# Patient Record
Sex: FEMALE | Race: WHITE | NOT HISPANIC OR LATINO | Employment: FULL TIME | URBAN - METROPOLITAN AREA
[De-identification: names, ages, dates, MRNs, and addresses within clinical notes are randomized per-mention and may not be internally consistent; named-entity substitution may affect disease eponyms.]

---

## 2014-12-18 LAB
EXTERNAL HIV CONFIRMATION: NORMAL
EXTERNAL HIV SCREEN: NORMAL

## 2017-01-02 ENCOUNTER — ALLSCRIPTS OFFICE VISIT (OUTPATIENT)
Dept: OTHER | Facility: OTHER | Age: 37
End: 2017-01-02

## 2017-01-26 ENCOUNTER — ALLSCRIPTS OFFICE VISIT (OUTPATIENT)
Dept: OTHER | Facility: OTHER | Age: 37
End: 2017-01-26

## 2017-01-30 ENCOUNTER — GENERIC CONVERSION - ENCOUNTER (OUTPATIENT)
Dept: OTHER | Facility: OTHER | Age: 37
End: 2017-01-30

## 2017-02-15 ENCOUNTER — GENERIC CONVERSION - ENCOUNTER (OUTPATIENT)
Dept: OTHER | Facility: OTHER | Age: 37
End: 2017-02-15

## 2017-03-07 ENCOUNTER — GENERIC CONVERSION - ENCOUNTER (OUTPATIENT)
Dept: OTHER | Facility: OTHER | Age: 37
End: 2017-03-07

## 2017-03-21 ENCOUNTER — GENERIC CONVERSION - ENCOUNTER (OUTPATIENT)
Dept: OTHER | Facility: OTHER | Age: 37
End: 2017-03-21

## 2017-04-24 ENCOUNTER — GENERIC CONVERSION - ENCOUNTER (OUTPATIENT)
Dept: OTHER | Facility: OTHER | Age: 37
End: 2017-04-24

## 2017-05-22 ENCOUNTER — ALLSCRIPTS OFFICE VISIT (OUTPATIENT)
Dept: OTHER | Facility: OTHER | Age: 37
End: 2017-05-22

## 2017-05-22 DIAGNOSIS — M54.50 LOW BACK PAIN: ICD-10-CM

## 2017-09-07 ENCOUNTER — ALLSCRIPTS OFFICE VISIT (OUTPATIENT)
Dept: OTHER | Facility: OTHER | Age: 37
End: 2017-09-07

## 2017-09-28 ENCOUNTER — GENERIC CONVERSION - ENCOUNTER (OUTPATIENT)
Dept: OTHER | Facility: OTHER | Age: 37
End: 2017-09-28

## 2017-11-03 ENCOUNTER — ALLSCRIPTS OFFICE VISIT (OUTPATIENT)
Dept: OTHER | Facility: OTHER | Age: 37
End: 2017-11-03

## 2017-11-08 NOTE — PROGRESS NOTES
Assessment  1  Acute bronchitis, unspecified organism (466 0) (J20 9)   2  Chronic low back pain (724 2,338 29) (M54 5,G89 29)    Plan  Acute bronchitis, unspecified organism    · Breo Ellipta 200-25 MCG/INH Inhalation Aerosol Powder Breath Activated; 1 puff by  mouth daily - rinse mouth out after use   · LevoFLOXacin 500 MG Oral Tablet (Levaquin); TAKE 1 TABLET DAILY  Chronic low back pain    · TraMADol HCl - 50 MG Oral Tablet; TAKE 2 TABLETS BY MOUTH 3 TIMES A DAY  AND TAKE 1 TABLET AT BEDTIME ASNEEDED    Discussion/Summary    69-year-old female presenting today for lower respiratory tract symptoms consistent with a possible acute bronchitis  I will cover for bacterial infection with a 7 day course of Levaquin with side effects discussed  I also gave her sample of Breo to help with her coarse breath sounds/wheezing and chest congestion  She was advised to use 1 puff a day rinsing mouth out after every use and she perform 1st dose in office to ensure proper use  I also advised she start Mucinex DM over the counter for mucus and cough  We will see how she does through the course of the next week and she is to call if symptoms persist or worsen despite treatment  also is asking for refill of her tramadol  She typically gets it prescribed by Dr Preet Barron for #210 pills for 30 days  I do not feel comfortable prescribing this regimen for her chronic pain  She admits that he had referred her to Pain Management for possible injections but she does not have the time  I told her that I did not believe this amount of medication chronically would be beneficial for her chronic back pain  I would have her discuss this with Dr Preet Barron out but in the meantime will give her #60 pills with no refill  Patient left with a printed prescription that was signed  [facility] web site checked  Possible side effects of new medications were reviewed with the patient/guardian today  The treatment plan was reviewed with the patient/guardian   The patient/guardian understands and agrees with the treatment plan      Chief Complaint  Pt c/o congestion, cough, sore throat, and fever x 1 week  History of Present Illness  HPI: 41y/o female here today for URI sxs past several days  Reports sore throat, loose cough, chest congestion  Slight wheeze  Headache  Denies nasal sxs or ear pain  felt feverish the other day  tried dayquil yesterday  Review of Systems    Constitutional: as noted in HPI    ENT: as noted in HPI  Cardiovascular: no complaints of slow or fast heart rate, no chest pain, no palpitations, no leg claudication or lower extremity edema  Respiratory: as noted in HPI  Active Problems  1  Basal cell carcinoma of back (173 51) (C44 519)   2  Chronic low back pain (724 2,338 29) (M54 5,G89 29)   3  Depression with anxiety (300 4) (F41 8)   4  Dysplastic nevus of trunk (216 5) (D22 5)   5  Insomnia (780 52) (G47 00)   6  Skin lesion of face (709 9) (L98 9)   7  History of Tobacco use (305 1) (Z72 0)    Past Medical History  1  History of Abdominal pain, RUQ (789 01) (R10 11)   2  History of Acute bacterial conjunctivitis of left eye (372 03) (H10 32)   3  History of Acute upper respiratory infection (465 9) (J06 9)   4  History of Anxiety (300 00) (F41 9)   5  History of Breastfeeding (infant) (V49 89) (Z78 9)   6  History of Dysuria (788 1) (R30 0)   7  History of abdominal pain (V13 89) (Z87 898)   8  History of acute bacterial sinusitis (V12 69) (Z87 09)   9  History of acute bacterial sinusitis (V12 69) (Z87 09)   10  History of acute sinusitis (V12 69) (Z87 09)   11  History of atopic dermatitis (V13 3) (Z87 2)   12  History of constipation (V12 79) (Z87 19)   13  History of fatigue (V13 89) (Z87 898)   14  History of insomnia (V13 89) (Z87 898)   15  History of low back pain (V13 59) (Z87 39)   16  History of low back pain (V13 59) (Z87 39)   17  History of low back pain (V13 59) (Z87 39)   18   History of urinary frequency (V13 09) (Z87 898)   19  History of Muscle strain of scapular region, left, initial encounter (840 9) (S46 912A)   20  History of Nasal mucosa dry (478 19) (J34 89)   21  History of Need for prophylactic vaccination and inoculation against influenza (V04 81)    (Z23)   22  History of Right wrist pain (719 43) (M25 531)   23  History of Sacroiliitis (720 2) (M46 1)   24  History of Screening for depression (V79 0) (Z13 89)   25  History of Skin lesion (709 9) (L98 9)   26  History of Symptoms of urinary tract infection (788 99) (R39 9)   27  History of Visit For: Medication Refill   28  History of Vitamin D deficiency (268 9) (E55 9)    Family History  Mother    1  No pertinent family history    Social History   · Denied: History of Alcohol Use (History)   · History of Current Every Day Smoker (305 1)   · History of Tobacco use (305 1) (Z72 0)  The social history was reviewed and updated today  Surgical History  1  History of  Section    Current Meds   1  TraMADol HCl - 50 MG Oral Tablet; TAKE 2 TABLETS BY MOUTH 3 TIMES A DAY AND   TAKE 1 TABLET AT BEDTIME ASNEEDED; Therapy: 06SCM1802 to ([place])  Requested for: 79Xdv4342; Last   Rx:[id number] Ordered    The medication list was reviewed and updated today  Allergies  1  No Known Drug Allergies    Vitals   Recorded: 27REH5253 09:21AM   Temperature 99 1 F, Tympanic   Heart Rate 80   Respiration Quality Normal   Respiration 17   Systolic 041, LUE, Standing   Diastolic 66, LUE, Standing   Height 5 ft 4 in   Weight 133 lb    BMI Calculated 22 83   BSA Calculated 1 64   O2 Saturation 98, RA   LMP 35Qlv6450   Pain Scale 5     Physical Exam    Constitutional   General appearance: Abnormal   acutely ill,-- comfortable,-- within normal limits of ideal weight-- and-- appearance reflects stated age  Eyes   Conjunctiva and lids: No swelling, erythema or discharge      Ears, Nose, Mouth, and Throat   External inspection of ears and nose: Normal     Otoscopic examination: Tympanic membranes translucent with normal light reflex  Canals patent without erythema  Nasal mucosa, septum, and turbinates: Normal without edema or erythema  Oropharynx: Abnormal  -- PND  Pulmonary   Respiratory effort: Abnormal  -- loose cough  Auscultation of lungs: Abnormal  -- wheeze, coarse BS throughout posterior lung fields  Cardiovascular   Auscultation of heart: Normal rate and rhythm, normal S1 and S2, without murmurs           Signatures   Electronically signed by : Roma Dos Santos [facility]; Nov  3 2017 10:13AM EST                       (Author)    Electronically signed by : Karmen Parks DO; Nov 8 2017  4:21AM EST                       (Co-author)

## 2017-11-28 ENCOUNTER — APPOINTMENT (OUTPATIENT)
Dept: RADIOLOGY | Facility: MEDICAL CENTER | Age: 37
End: 2017-11-28
Payer: COMMERCIAL

## 2017-11-28 ENCOUNTER — ALLSCRIPTS OFFICE VISIT (OUTPATIENT)
Dept: OTHER | Facility: OTHER | Age: 37
End: 2017-11-28

## 2017-11-28 DIAGNOSIS — M43.06 SPONDYLOLYSIS OF LUMBAR REGION: ICD-10-CM

## 2017-11-28 PROCEDURE — 72114 X-RAY EXAM L-S SPINE BENDING: CPT

## 2017-11-29 NOTE — CONSULTS
Assessment  Assessed    1  Chronic low back pain (724 2,338 29) (M54 5,G89 29)   2  Lumbar pars defect (738 4) (M43 06)   3  Lumbar spondylosis (721 3) (M47 816)    Plan  Lumbar pars defect    · XR SPINE LUMBAR COMPLETE W BENDING MINIMUM 6 VIEWS; Status:Active; Requested for:28Nov2017;    Perform:[facility]; [phone]; Ordered;pars defect; Ordered By:Gurvinder Salas;  Lumbar spondylosis    · Diagnostic Lumbar Medial Branch Block-POC; Status:Active; Requested for:28Nov2017;    Perform: In Office; Order Comments:Bilateral L3-5 MBB 1 ; TGV:[id number]; Ordered;spondylosis; Ordered By:Kendra Salas; Discussion/Summary    1  We will schedule the patient for bilateral L3-5 medial branch nerve blocks with intention of moving forward towards radiofrequency ablation if there is an appropriate diagnostic response  The initial blocks will be performed with 2% lidocaine and if an appropriate response is obtained upon review of the patient's pain diary, a confirmatory block will be scheduled with 0 5% bupivacaine  the office today, we reviewed the nature of facet joint pathology in depth using a spine model  We discussed the approach we would use for the injections and provided literature for home review  The patient understands the risks associated with the procedure including bleeding, infection, tissue injury, and allergic reaction and provided verbal informed consent in the office today  The patient states that she would like to wean off of tramadol  I would recommend her current prescribing physician begin decreasing Tramadol by one pill per day for each week until she is successfully weaned from the medication  Currently using 7 pills per day  Would also consider adding an antiinflammatory depending on how she does with MBBs and RFA  Chief Complaint  Chief Complaints    1   Back Pain  chronic low back pain      History of Present Illness  The patient is a very pleasant 50-year-old female sent from her primary care team for further evaluation and management of her chronic lower back pain which started after her pregnancy about 8 years ago  She was evaluated by Dr Argenis Rivas in the past and found to have bilateral L5 pars defects  I do not have any imaging to review  currently describes moderate pain rated as a 4/10 while taking medications  This is nearly constant and worse at nighttime  She characterizes the pain as a dull aching sensation in her back as well as throbbing without radiation down the legs  factors include lying down, standing, sitting, exercise, coughing, sneezing, and during menstruation she has intense pain  has tried physical therapy as well as exercise, local heat or ice application and chiropractic manipulation all without relief   have personally reviewed and/or updated the patient's past medical history, past surgical history, family history, social history, current medications, allergies, and vital signs today  did review the [place] Prescription Drug Monitoring Program website today which was appropriate for the medications being prescribed  Referring physician is  Dr Tivis Nyhan  Primary Care physician is  patricia Lux presents with complaints of gradual onset of constant episodes of moderate bilateral lower back pain, described as dull, aching and throbbing, non-radiating  On a scale of 1 to 10, the patient rates the pain as 5  Review of Systems   Constitutional: no fever,-- no recent weight gain-- and-- no recent weight loss  Eyes: no double vision-- and-- no blurry vision  Cardiovascular: no chest pain,-- no palpitations-- and-- no lower extremity edema  Respiratory: no complaints of shortness of breath-- and-- no wheezing  Musculoskeletal: no difficulty walking,-- no muscle weakness,-- no joint stiffness,-- no joint swelling,-- no limb swelling,-- no pain in extremity-- and-- no decreased range of motion    Neurological: no dizziness,-- no difficulty swallowing,-- no memory loss,-- no loss of consciousness-- and-- no seizures  Gastrointestinal: constipation, but-- no nausea,-- no vomiting-- and-- no diarrhea  Genitourinary: no difficulty initiating urine stream,-- no genital pain-- and-- no frequent urination  Integumentary: no complaints of skin rash  Psychiatric: no depression  Endocrine: no excessive thirst,-- no adrenal disease,-- no hypothyroidism-- and-- no hyperthyroidism  Hematologic/Lymphatic: no tendency for easy bruising-- and-- no tendency for easy bleeding  Active Problems  Problems    1  Acute bronchitis, unspecified organism (466 0) (J20 9)   2  Basal cell carcinoma of back (173 51) (C44 519)   3  Chronic low back pain (724 2,338 29) (M54 5,G89 29)   4  Depression with anxiety (300 4) (F41 8)   5  Dysplastic nevus of trunk (216 5) (D22 5)   6  Insomnia (780 52) (G47 00)   7  Skin lesion of face (709 9) (L98 9)    Past Medical History  Problems    1  History of Abdominal pain, RUQ (789 01) (R10 11)   2  History of Acute bacterial conjunctivitis of left eye (372 03) (H10 32)   3  History of Acute upper respiratory infection (465 9) (J06 9)   4  History of Anxiety (300 00) (F41 9)   5  History of Breastfeeding (infant) (V49 89) (Z78 9)   6  History of Dysuria (788 1) (R30 0)   7  History of abdominal pain (V13 89) (Z87 898)   8  History of acute bacterial sinusitis (V12 69) (Z87 09)   9  History of acute bacterial sinusitis (V12 69) (Z87 09)   10  History of acute sinusitis (V12 69) (Z87 09)   11  History of atopic dermatitis (V13 3) (Z87 2)   12  History of constipation (V12 79) (Z87 19)   13  History of fatigue (V13 89) (Z87 898)   14  History of insomnia (V13 89) (Z87 898)   15  History of low back pain (V13 59) (Z87 39)   16  History of low back pain (V13 59) (Z87 39)   17  History of low back pain (V13 59) (Z87 39)   18  History of urinary frequency (V13 09) (Z87 898)   19   History of Muscle strain of scapular region, left, initial encounter (840 9) (S41 967D)   20  History of Nasal mucosa dry (478 19) (J34 89)   21  History of Need for prophylactic vaccination and inoculation against influenza (V04 81)  (Z23)   22  History of Right wrist pain (719 43) (M25 531)   23  History of Sacroiliitis (720 2) (M46 1)   24  History of Screening for depression (V79 0) (Z13 89)   25  History of Skin lesion (709 9) (L98 9)   26  History of Symptoms of urinary tract infection (788 99) (R39 9)   27  History of Visit For: Medication Refill   28  History of Vitamin D deficiency (268 9) (E55 9)    Surgical History  Problems    1  History of  Section    Social History  Problems    · Denied: History of Alcohol Use (History)    Current Meds   1  TraMADol HCl - 50 MG Oral Tablet; TAKE 2 TABLETS BY MOUTH 3 TIMES A DAY AND TAKE 1 TABLET AT BEDTIME ASNEEDED; Therapy: 27KBU9454 to (Evaluate:06Mef9248)  Requested for: 23JFD3092; Last Rx:2017 Ordered    Allergies  Medication    1  No Known Drug Allergies    Vitals  Vital Signs    Recorded: 27OLX7202 08:43AM   Heart Rate 76   Respiration 12   Systolic 304   Diastolic 62   Height 5 ft 4 in   Weight 132 lb    BMI Calculated 22 66   BSA Calculated 1 64   Pain Scale 4       Physical Exam   Constitutional  General appearance: Well developed, well nourished, alert, in no distress, non-toxic and no overt pain behavior  Eyes  Sclera: anicteric  HEENT  Hearing grossly intact  Pulmonary  Respiratory effort: Even and unlabored  Cardiovascular  Examination of extremities: No edema or pitting edema present  Skin  Skin and subcutaneous tissue: Normal without rashes or lesions, well hydrated  Psychiatric  Mood and affect: Mood and affect appropriate  Neurologic  Cranial nerves: Cranial nerves II-XII grossly intact     Musculoskeletal  Gait and station: Normal    Lumbar/Sacral Spine examination demonstrates Lumbosacral Spine:  Tenderness: right paraspinal-- and-- left paraspinal  Flexion was not restricted-- and-- was painless  Extension was restricted-- and-- was painful  Left lateral flexion was not restricted-- and-- was painless  Right lateral flexion was not restricted-- and-- was painless  Rotation to the left was not restricted-- and-- was painless  Rotation to the right was not restricted-- and-- was painless  Foot and ankle strength was normal bilaterally  Knee strength was normal bilaterally  Hip strength was normal bilaterally  Evaluation of Muscle Stretch Reflexes on the right side demonstrates 2/4 Knee Jerk Reflex,-- 2/4 Ankle Jerk Reflex-- and-- negative right ankle clonus  Evaluation of Muscle Stretch Reflexes on the left side demonstrates negative left ankle clonus, but-- 2/4 Knee Jerk Reflex-- and-- 2/4 Ankle Jerk Reflex  Special Tests: negative Slump test on right-- and-- negative Slump test on left        Signatures   Electronically signed by : Jack Hammer DO; Nov 28 2017  9:05AM EST                       (Author)

## 2017-12-01 ENCOUNTER — GENERIC CONVERSION - ENCOUNTER (OUTPATIENT)
Dept: OTHER | Facility: OTHER | Age: 37
End: 2017-12-01

## 2017-12-11 ENCOUNTER — ALLSCRIPTS OFFICE VISIT (OUTPATIENT)
Dept: RADIOLOGY | Facility: MEDICAL CENTER | Age: 37
End: 2017-12-11
Payer: COMMERCIAL

## 2017-12-21 ENCOUNTER — GENERIC CONVERSION - ENCOUNTER (OUTPATIENT)
Dept: OTHER | Facility: OTHER | Age: 37
End: 2017-12-21

## 2018-01-04 ENCOUNTER — GENERIC CONVERSION - ENCOUNTER (OUTPATIENT)
Dept: OTHER | Facility: OTHER | Age: 38
End: 2018-01-04

## 2018-01-12 VITALS
TEMPERATURE: 98.9 F | RESPIRATION RATE: 16 BRPM | WEIGHT: 134.06 LBS | DIASTOLIC BLOOD PRESSURE: 62 MMHG | SYSTOLIC BLOOD PRESSURE: 98 MMHG | BODY MASS INDEX: 22.89 KG/M2 | OXYGEN SATURATION: 98 % | HEIGHT: 64 IN | HEART RATE: 92 BPM

## 2018-01-12 VITALS
DIASTOLIC BLOOD PRESSURE: 72 MMHG | TEMPERATURE: 99 F | HEIGHT: 64 IN | WEIGHT: 131.44 LBS | BODY MASS INDEX: 22.44 KG/M2 | SYSTOLIC BLOOD PRESSURE: 104 MMHG | HEART RATE: 78 BPM | RESPIRATION RATE: 16 BRPM | OXYGEN SATURATION: 98 %

## 2018-01-13 VITALS
DIASTOLIC BLOOD PRESSURE: 62 MMHG | BODY MASS INDEX: 22.53 KG/M2 | RESPIRATION RATE: 12 BRPM | WEIGHT: 132 LBS | HEIGHT: 64 IN | HEART RATE: 76 BPM | SYSTOLIC BLOOD PRESSURE: 116 MMHG

## 2018-01-14 VITALS
RESPIRATION RATE: 17 BRPM | BODY MASS INDEX: 22.71 KG/M2 | SYSTOLIC BLOOD PRESSURE: 120 MMHG | TEMPERATURE: 99.1 F | WEIGHT: 133 LBS | HEART RATE: 80 BPM | HEIGHT: 64 IN | OXYGEN SATURATION: 98 % | DIASTOLIC BLOOD PRESSURE: 66 MMHG

## 2018-01-14 VITALS
BODY MASS INDEX: 22.97 KG/M2 | WEIGHT: 134.56 LBS | OXYGEN SATURATION: 98 % | HEIGHT: 64 IN | HEART RATE: 75 BPM | TEMPERATURE: 99.2 F | RESPIRATION RATE: 16 BRPM | SYSTOLIC BLOOD PRESSURE: 100 MMHG | DIASTOLIC BLOOD PRESSURE: 70 MMHG

## 2018-01-14 VITALS
BODY MASS INDEX: 22.79 KG/M2 | DIASTOLIC BLOOD PRESSURE: 68 MMHG | WEIGHT: 133.5 LBS | RESPIRATION RATE: 16 BRPM | OXYGEN SATURATION: 98 % | HEIGHT: 64 IN | HEART RATE: 76 BPM | TEMPERATURE: 98.3 F | SYSTOLIC BLOOD PRESSURE: 96 MMHG

## 2018-01-16 NOTE — RESULT NOTES
Verified Results  US ABDOMEN LIMITED [id number] 09:03AM Sharon Peer Order Number: [id number]     Test Name Result Flag Reference   US ABDOMEN LIMITED (Report)     RIGHT UPPER QUADRANT ULTRASOUND     INDICATION: Pain between the shoulders after eating        COMPARISON: None  TECHNIQUE:  Real-time ultrasound of the right upper quadrant was performed with a curvilinear transducer with both volumetric sweeps and still imaging techniques  FINDINGS:   PANCREAS: Visualized portions of the pancreas are within normal limits  AORTA AND IVC: Visualized portions are normal for patient age  LIVER:   Size: Within normal range  The liver measures 12 4 cm in the midclavicular line  Contour: Surface contour is smooth  Parenchyma: Echogenicity and echotexture are within normal limits  No evidence of suspicious mass  The main portal vein is patent and hepatopetal       BILIARY:   The gallbladder is normal in caliber  No wall thickening or pericholecystic fluid  No stones or sludge identified  No sonographic Dominguez's sign  No intrahepatic biliary dilatation  CBD measures 4 mm  No choledocholithiasis  KIDNEY:    Right kidney measures 10 7 x 4 1 cm  Within normal limits  ASCITES:  None  IMPRESSION:   Normal exam        Workstation performed: [id number]     Signed by:    Serena Adam DO   2/25/16

## 2018-01-17 ENCOUNTER — HOSPITAL ENCOUNTER (OUTPATIENT)
Dept: RADIOLOGY | Facility: MEDICAL CENTER | Age: 38
Discharge: HOME | End: 2018-01-17
Attending: PHYSICAL MEDICINE & REHABILITATION
Payer: COMMERCIAL

## 2018-01-17 VITALS
OXYGEN SATURATION: 100 % | HEART RATE: 74 BPM | TEMPERATURE: 98.5 F | SYSTOLIC BLOOD PRESSURE: 100 MMHG | RESPIRATION RATE: 20 BRPM | DIASTOLIC BLOOD PRESSURE: 69 MMHG

## 2018-01-17 RX ORDER — BUPIVACAINE HYDROCHLORIDE 5 MG/ML
10 INJECTION, SOLUTION EPIDURAL; INTRACAUDAL ONCE
Status: COMPLETED | OUTPATIENT
Start: 2018-01-17 | End: 2018-01-17

## 2018-01-17 RX ADMIN — BUPIVACAINE HYDROCHLORIDE 3 ML: 5 INJECTION, SOLUTION EPIDURAL; INTRACAUDAL at 09:02

## 2018-01-17 RX ADMIN — IOHEXOL 1.5 ML: 300 INJECTION, SOLUTION INTRAVENOUS at 09:01

## 2018-01-17 NOTE — DISCHARGE INSTRUCTIONS

## 2018-01-17 NOTE — H&P
History of Present Illness: The patient is a 44 y o  female who presents with complaints of Axial lower back pain  Assessment  Assessed    1  Chronic low back pain (724 2,338 29) (M54 5,G89 29)   2  Lumbar pars defect (738 4) (M43 06)   3  Lumbar spondylosis (721 3) (M47 816)     Plan  Lumbar pars defect    · XR SPINE LUMBAR COMPLETE W BENDING MINIMUM 6 VIEWS; Status:Active; Requested for:28Nov2017;    Perform:[facility]; [phone]; Ordered;:Lumbar pars defect; Ordered By:Gurvinder Salas;  Lumbar spondylosis    · Diagnostic Lumbar Medial Branch Block-POC; Status:Active; Requested for:28Nov2017;    Perform: In Office; Order Comments:Bilateral L3-5 MBB 1 ; XVV:[id number]; Ordered;:Lumbar spondylosis; Ordered By:Brittany Salas;     Discussion/Summary     1  We will schedule the patient for bilateral L3-5 medial branch nerve blocks with intention of moving forward towards radiofrequency ablation if there is an appropriate diagnostic response  The initial blocks will be performed with 2% lidocaine and if an appropriate response is obtained upon review of the patient's pain diary, a confirmatory block will be scheduled with 0 5% bupivacaine  the office today, we reviewed the nature of facet joint pathology in depth using a spine model  We discussed the approach we would use for the injections and provided literature for home review  The patient understands the risks associated with the procedure including bleeding, infection, tissue injury, and allergic reaction and provided verbal informed consent in the office today        The patient states that she would like to wean off of tramadol  I would recommend her current prescribing physician begin decreasing Tramadol by one pill per day for each week until she is successfully weaned from the medication  Currently using 7 pills per day  Would also consider adding an antiinflammatory depending on how she does with MBBs and RFA        Chief Complaint  Chief Complaints 1  Back Pain  chronic low back pain      History of Present Illness  The patient is a very pleasant 50-year-old female sent from her primary care team for further evaluation and management of her chronic lower back pain which started after her pregnancy about 8 years ago  She was evaluated by Dr Armando Jarvis in the past and found to have bilateral L5 pars defects  I do not have any imaging to review  currently describes moderate pain rated as a 4/10 while taking medications  This is nearly constant and worse at nighttime  She characterizes the pain as a dull aching sensation in her back as well as throbbing without radiation down the legs  factors include lying down, standing, sitting, exercise, coughing, sneezing, and during menstruation she has intense pain  has tried physical therapy as well as exercise, local heat or ice application and chiropractic manipulation all without relief   have personally reviewed and/or updated the patient's past medical history, past surgical history, family history, social history, current medications, allergies, and vital signs today  did review the [place] Prescription Drug Monitoring Program website today which was appropriate for the medications being prescribed  Referring physician is  Dr Jorge Luis Coronel  Primary Care physician is  patricia Mckay presents with complaints of gradual onset of constant episodes of moderate bilateral lower back pain, described as dull, aching and throbbing, non-radiating  On a scale of 1 to 10, the patient rates the pain as 5  Review of Systems   Constitutional: no fever,-- no recent weight gain-- and-- no recent weight loss  Eyes: no double vision-- and-- no blurry vision  Cardiovascular: no chest pain,-- no palpitations-- and-- no lower extremity edema  Respiratory: no complaints of shortness of breath-- and-- no wheezing    Musculoskeletal: no difficulty walking,-- no muscle weakness,-- no joint stiffness,-- no joint swelling,-- no limb swelling,-- no pain in extremity-- and-- no decreased range of motion  Neurological: no dizziness,-- no difficulty swallowing,-- no memory loss,-- no loss of consciousness-- and-- no seizures  Gastrointestinal: constipation, but-- no nausea,-- no vomiting-- and-- no diarrhea  Genitourinary: no difficulty initiating urine stream,-- no genital pain-- and-- no frequent urination  Integumentary: no complaints of skin rash  Psychiatric: no depression  Endocrine: no excessive thirst,-- no adrenal disease,-- no hypothyroidism-- and-- no hyperthyroidism  Hematologic/Lymphatic: no tendency for easy bruising-- and-- no tendency for easy bleeding  Active Problems  Problems    1  Acute bronchitis, unspecified organism (466 0) (J20 9)   2  Basal cell carcinoma of back (173 51) (C44 519)   3  Chronic low back pain (724 2,338 29) (M54 5,G89 29)   4  Depression with anxiety (300 4) (F41 8)   5  Dysplastic nevus of trunk (216 5) (D22 5)   6  Insomnia (780 52) (G47 00)   7  Skin lesion of face (709 9) (L98 9)     Past Medical History  Problems    1  History of Abdominal pain, RUQ (789 01) (R10 11)   2  History of Acute bacterial conjunctivitis of left eye (372 03) (H10 32)   3  History of Acute upper respiratory infection (465 9) (J06 9)   4  History of Anxiety (300 00) (F41 9)   5  History of Breastfeeding (infant) (V49 89) (Z78 9)   6  History of Dysuria (788 1) (R30 0)   7  History of abdominal pain (V13 89) (Z87 898)   8  History of acute bacterial sinusitis (V12 69) (Z87 09)   9  History of acute bacterial sinusitis (V12 69) (Z87 09)   10  History of acute sinusitis (V12 69) (Z87 09)   11  History of atopic dermatitis (V13 3) (Z87 2)   12  History of constipation (V12 79) (Z87 19)   13  History of fatigue (V13 89) (Z87 898)   14  History of insomnia (V13 89) (Z87 898)   15  History of low back pain (V13 59) (Z87 39)   16  History of low back pain (V13 59) (Z87 39)   17   History of low back pain (V13 59) (Z87 39)   18  History of urinary frequency (V13 09) (Z87 898)   19  History of Muscle strain of scapular region, left, initial encounter (840 9) (S46 912A)   20  History of Nasal mucosa dry (478 19) (J34 89)   21  History of Need for prophylactic vaccination and inoculation against influenza (V04 81)  (Z23)   22  History of Right wrist pain (719 43) (M25 531)   23  History of Sacroiliitis (720 2) (M46 1)   24  History of Screening for depression (V79 0) (Z13 89)   25  History of Skin lesion (709 9) (L98 9)   26  History of Symptoms of urinary tract infection (788 99) (R39 9)   27  History of Visit For: Medication Refill   28  History of Vitamin D deficiency (268 9) (E55 9)     Surgical History  Problems    1  History of  Section     Social History  Problems    · Denied: History of Alcohol Use (History)     Current Meds   1  TraMADol HCl - 50 MG Oral Tablet; TAKE 2 TABLETS BY MOUTH 3 TIMES A DAY AND TAKE 1 TABLET AT BEDTIME ASNEEDED; Therapy: 27ZHY2976 to (Evaluate:85Nkm6218)  Requested for: 26OLZ3477; Last Rx:2017 Ordered     Allergies  Medication    1  No Known Drug Allergies     Vitals  Vital Signs         Physical Exam   Constitutional  General appearance: Well developed, well nourished, alert, in no distress, non-toxic and no overt pain behavior  Eyes  Sclera: anicteric  HEENT  Hearing grossly intact  Pulmonary  Respiratory effort: Even and unlabored  Cardiovascular  Examination of extremities: No edema or pitting edema present  Skin  Skin and subcutaneous tissue: Normal without rashes or lesions, well hydrated  Psychiatric  Mood and affect: Mood and affect appropriate  Neurologic  Cranial nerves: Cranial nerves II-XII grossly intact  Musculoskeletal  Gait and station: Normal    Lumbar/Sacral Spine examination demonstrates Lumbosacral Spine:  Tenderness: right paraspinal-- and-- left paraspinal  Flexion was not restricted-- and-- was painless   Extension was restricted-- and-- was painful  Left lateral flexion was not restricted-- and-- was painless  Right lateral flexion was not restricted-- and-- was painless  Rotation to the left was not restricted-- and-- was painless  Rotation to the right was not restricted-- and-- was painless  Foot and ankle strength was normal bilaterally  Knee strength was normal bilaterally  Hip strength was normal bilaterally  Evaluation of Muscle Stretch Reflexes on the right side demonstrates 2/4 Knee Jerk Reflex,-- 2/4 Ankle Jerk Reflex-- and-- negative right ankle clonus  Evaluation of Muscle Stretch Reflexes on the left side demonstrates negative left ankle clonus, but-- 2/4 Knee Jerk Reflex-- and-- 2/4 Ankle Jerk Reflex  Special Tests: negative Slump test on right-- and-- negative Slump test on left  No Known Allergies    Physical Exam:   Vitals:    01/17/18 0847   BP: 105/67   Pulse: 78   Resp: 20   Temp: 98 5 °F (36 9 °C)   SpO2: 99%     General: Awake, Alert, Oriented x 3, Mood and affect appropriate  Respiratory: Respirations even and unlabored  Cardiovascular: Peripheral pulses intact; no edema  Musculoskeletal Exam:   Lumbar pain with extension and rotation    ASA Score:  2  Assessment:  Lumbar spondylosis    Plan:  Bilateral L3-5 MBB 2

## 2018-01-23 ENCOUNTER — ALLSCRIPTS OFFICE VISIT (OUTPATIENT)
Dept: OTHER | Facility: OTHER | Age: 38
End: 2018-01-23

## 2018-01-23 ENCOUNTER — GENERIC CONVERSION - ENCOUNTER (OUTPATIENT)
Dept: OTHER | Facility: OTHER | Age: 38
End: 2018-01-23

## 2018-01-23 NOTE — PROCEDURES
Chief Complaint  Pt presents for biopsy from upper back  Current Meds   1  Sertraline HCl - 50 MG Oral Tablet; TAKE 1 AND 1/2 TABLETS DAILY; Therapy: 28SHZ5186 to (Evaluate:12Apr2017)  Requested for: 14Oct2016; Last   Rx:14Oct2016 Ordered   2  TraMADol HCl - 50 MG Oral Tablet; Take 2 tablets three times a day and 1 at hs PRN; Therapy: 44CFB0295 to (Evaluate:29Jan2017)  Requested for: 98RXE1534; Last   Rx:30Nov2016 Ordered    Allergies    1  No Known Drug Allergies    Vitals  Signs    Temperature: 98 8 F, Tympanic  Heart Rate: 91  Pulse Quality: Norm  Respiration Quality: Norm  Respiration: 16  Systolic: 92, LUE, Sitting  Diastolic: 58, LUE, Sitting  Height: 5 ft 4 in  Weight: 134 lb 0 96 oz  BMI Calculated: 23 01  BSA Calculated: 1 66  O2 Saturation: 96, RA  LMP: 26-Nov-2016  Pain Scale: 0    Procedure    Procedure: excision of lesion and skin biopsy  Indications for the procedure include basal cell carcinoma suspected  Risks and infection risk were discussed with the patient  Written consent was obtained prior to the procedure  Procedure Note:   Anesthesia: Of lidocaine 1% with epinephrine  The patient was prepped and draped in the usual sterile fashion using Betadine and using alcohol  the lesion was excised with a 3 mm margin in an elliptical fashion  The subcutaneous layer was closed with 3 Vicryl suture(s)  The cutaneous layer was closed with 6, interrupted 4-0  Specimen: the excised lesion was place in buffered formalin and sent for pathology  Post-Procedure:      Assessment    1  Basal cell carcinoma of back (173 51) (C44 519)    Plan   Basal cell carcinoma of back    · (1) TISSUE EXAM; Status: In Progress - Specimen/Data Collected;   Done: 20XIM4361  A : basal cell CA re excision L shoulder  A Date/Time: : 46WIK5849  A : Skin Lesion, Excisional Biopsy  Impression: : basal cell CA re excision L shoulder  Need for prophylactic vaccination and inoculation against influenza    · Stop: Fluzone Quadrivalent Intramuscular Suspension    Basal cell carcinoma of back (173 51) (L27 567)          Discussion/Summary    --Basal cell carcinoma left shoulder: Originally biopsied December 15 showing superficial basal cell carcinoma  Lesion was reexcised today with 3 mm borders  Lesion sent to pathology  Closed with 6 interrupted Ethilon sutures  Patient tolerated procedure well  Recheck in 10-14 days for suture removal  Call with any further problems    ADDENDUM: BIOPSY SHOWS INVOLVEMENT OF PERIPHERAL EDGE OF BIOPSY  WILL REFER TO DR PELLETIER FOR DEFINITIVE TREATMENT  Signatures   Electronically signed by :  Ino Silverman DO; Jan 2 2017  3:53PM EST                       (Author)

## 2018-01-23 NOTE — PROCEDURES
Chief Complaint  pt here to have a mole removed from her left shoulder      Current Meds   1  Sertraline HCl - 50 MG Oral Tablet; TAKE 1 AND 1/2 TABLETS DAILY; Therapy: 45XEC4307 to (Evaluate:12Apr2017)  Requested for: 14Oct2016; Last   Rx:14Oct2016 Ordered   2  TraMADol HCl - 50 MG Oral Tablet; Take 2 tablets three times a day and 1 at hs PRN; Therapy: 68FDG8420 to (Evaluate:29Jan2017)  Requested for: 37QPF8572; Last   Rx:30Nov2016 Ordered    Allergies    1  No Known Drug Allergies    Vitals  Signs    Temperature: 98 3 F, Tympanic  Heart Rate: 77  Respiration: 16  Systolic: 448  Diastolic: 70  Height: 5 ft 4 in  Weight: 135 lb 3 04 oz  BMI Calculated: 23 21  BSA Calculated: 1 66  O2 Saturation: 98  LMP: 26-Nov-2016  Pain Scale: 0    Procedure    Procedure: excision of lesion and skin biopsy  Indications for the procedure include the lesion has changed  Risks and infection risk were discussed with the patient  Written consent was obtained prior to the procedure  Procedure Note:   Anesthesia: 3 mm pigmented nevus L upper back  Of lidocaine 1% with epinephrine  The patient was prepped and draped in the usual sterile fashion using Betadine and using alcohol  a 4 mm punch biopsy of the lesion was taken  The cutaneous layer was closed with 2, interrupted 5-0  Specimen: the excised lesion was place in buffered formalin and sent for pathology  Post-Procedure:   Patient Status: the patient tolerated the procedure well  Complications: there were no complications  Follow-up in the office in 7 day(s)  Assessment    1  Basal cell carcinoma of back (173 51) (C44 519)    Plan   Dysplastic nevus of trunk    · (1) TISSUE EXAM; Status: In Progress - Specimen/Data Collected;   Done: 07IYS4988  A : 3 mm pigmented lesion L upper back  A Date/Time: : 11UVR0510  A : Skin Punch Biopsy  Impression: : 3 mm lesion L upper back    Dysplastic nevus of trunk (216 5) (D22 5)          Discussion/Summary    --3 mm pigmented nevus L upper back: 4 mm punch bx today  pt tolerated procedure well  sent to path  wound care discussed  recheck 7 days for suture removal    NOTE: will also remove lesion R temple region (shave)    ADDENDUM: SKIN BIOPSY SHOWED BASAL CELL CARCINOMA  REC: WIDE RE EXCISION  PT NOTIFIED    SHE WILL SCHEDULE THIS W/ ME IN NEAR FUTURE  Future Appointments    Date/Time Provider Specialty Site   12/22/2016 09:15 AM Linnea De Los Santos DO [address]     Signatures   Electronically signed by :  Jesús Nunez DO; Dec 15 2016  9:56AM EST                       (Author)

## 2018-01-23 NOTE — RESULT NOTES
Message   Recorded as Task   Date: 12/12/2017 10:24 AM, Created By: González Mcmahon   Task Name: Follow Up   Assigned To: [facility] surgery sched,Team   Regarding Patient: Lamin Couch, Status: In Progress   Yossi Samuel - 12 Dec 2017 10:24 AM     TASK CREATED  please schedule MBB#2   Zafar Almontein - 12 Dec 2017 10:44 AM     TASK REASSIGNED: Previously Assigned To [id number] [place] [address] clinical,Team   Naomie Rogers - 13 Dec 2017 8:26 AM     TASK EDITED  noted, will coordinate   Naomie Rogers - 13 Dec 2017 2:42 PM     TASK IN PROGRESS   Naomie Rogers - 13 Dec 2017 2:44 PM     TASK EDITED  Left message for patient to call me back to schedule  Naomie Rogers 20 Dec 2017 10:22 AM     TASK EDITED  Left message for patient to call me back   Naomie Rogers - 20 Dec 2017 11:08 AM     TASK EDITED  Rec'd message from patient  She is going to the "[email or web address] with her son but will try to call me back afterwards  (I will be in training session this afternoon)  Please transfer to my voicemail or just update this task that she called and I will call her tomorrow to coordinate scheduling  Naomie Rogers - 21 Dec 2017 3:03 PM     TASK EDITED  Patient called back and scheduled:     bilateral L3-5 MBB #2 on 1/17/18    Reviewed instructions: , NPO 1 hour prior, loose-fitting pants, if ill/fever/infx/abx to call and reschedule  Also refrain from PRN, as-needed pain meds x6h prior  She stated verbal understanding      Will have new insurance in 2018:   [employer]  ID: [id number]

## 2018-01-24 NOTE — PROGRESS NOTES
Assessment   1  Axillary lump, unspecified laterality (782 2) (R22 30)    Discussion/Summary      --R axillary lump: Patient presents with possible lump under her right axilla  somewhat tender  denies any fevers or chills  current with gyn exams  she has never had a mammogram  No family history of breast cancer  her exam today did not reveal any abnormalities  Brandyn Smith has an appointment with her gynecologist on February 7th  I encouraged her to bring it up with them at her appointment  consider checking ultrasound of right axilla if symptoms persist ( with possible baseline mammogram)  Chief Complaint   Patient presents with c/o a possible cyst under the right arm  History of Present Illness   HPI: Patient presents with possible lump under her right axilla  somewhat tender  denies any fevers or chills  current with gyn exams  she has never had a mammogram  No family history of breast cancer      Review of Systems        Constitutional: No fever, no chills, feels well, no tiredness, no recent weight gain or loss  Cardiovascular: no complaints of slow or fast heart rate, no chest pain, no palpitations, no leg claudication or lower extremity edema  Respiratory: no complaints of shortness of breath, no wheezing, no dyspnea on exertion, no orthopnea or PND  Integumentary: as noted in HPI  Active Problems   1  Basal cell carcinoma of back (173 51) (C44 519)   2  Chronic low back pain (724 2,338 29) (M54 5,G89 29)   3  Depression with anxiety (300 4) (F41 8)   4  Dysplastic nevus of trunk (216 5) (D23 5)   5  Insomnia (780 52) (G47 00)   6  Lumbar pars defect (738 4) (M43 06)   7  Lumbar spondylosis (721 3) (M47 816)    Past Medical History   1  History of Abdominal pain, RUQ (789 01) (R10 11)   2  History of Acute bacterial conjunctivitis of left eye (372 03) (H10 32)   3  History of Acute upper respiratory infection (465 9) (J06 9)   4  History of Anxiety (300 00) (F41 9)   5   History of Breastfeeding (infant) (V49 89) (Z78 9)   6  History of Dysuria (788 1) (R30 0)   7  History of abdominal pain (V13 89) (Z87 898)   8  History of acute bacterial sinusitis (V12 69) (Z87 09)   9  History of acute bacterial sinusitis (V12 69) (Z87 09)   10  History of acute bronchitis (V12 69) (Z87 09)   11  History of acute sinusitis (V12 69) (Z87 09)   12  History of atopic dermatitis (V13 3) (Z87 2)   13  History of constipation (V12 79) (Z87 19)   14  History of fatigue (V13 89) (Z87 898)   15  History of insomnia (V13 89) (Z87 898)   16  History of low back pain (V13 59) (Z87 39)   17  History of low back pain (V13 59) (Z87 39)   18  History of low back pain (V13 59) (Z87 39)   19  History of urinary frequency (V13 09) (Z87 898)   20  History of Muscle strain of scapular region, left, initial encounter (840 9) (S46 912A)   21  History of Nasal mucosa dry (478 19) (J34 89)   22  History of Need for prophylactic vaccination and inoculation against influenza (V04 81)      (Z23)   23  History of Right wrist pain (719 43) (M25 531)   24  History of Sacroiliitis (720 2) (M46 1)   25  History of Screening for depression (V79 0) (Z13 89)   26  History of Skin lesion (709 9) (L98 9)   27  History of Skin lesion of face (709 9) (L98 9)   28  History of Symptoms of urinary tract infection (788 99) (R39 9)   29  History of Visit For: Medication Refill   30  History of Vitamin D deficiency (268 9) (E55 9)  Active Problems And Past Medical History Reviewed: The active problems and past medical history were reviewed and updated today  Family History   Family History    1  Family history of diabetes mellitus (V18 0) (Z83 3)   2  Family history of hypertension (V17 49) (Z82 49)   3   Family history of malignant neoplasm (V16 9) (Z80 9)    Social History    · Denied: History of Alcohol Use (History)   · Employed   · Former cigarette smoker (V15 82) (V95 200)   · Lives with children   · Lives with spouse   ·    · No illicit drug use  The social history was reviewed and updated today  The social history was reviewed and is unchanged  Surgical History   1  History of  Section   2  History of  Section    Current Meds    1  TraMADol HCl - 50 MG Oral Tablet; TAKE 2 TABLET BY MOUTH 3 TIMES A DAY AND     TAKE 1 AT BEDTIME AS NEEDED; Therapy: 71DNQ6199 to 22 105615)  Requested for: 45SYO4277; Last     Rx:2018 Ordered     The medication list was reviewed and updated today  Allergies   1  No Known Drug Allergies    Vitals    Recorded: 66JQX9398 03:06PM   Temperature 98 6 F, Tympanic   Heart Rate 83   Pulse Quality Normal   Systolic 849, LUE, Sitting   Diastolic 70, LUE, Sitting   BP CUFF SIZE Standard   Height 5 ft 4 in   Weight 132 lb 1 oz   BMI Calculated 22 67   BSA Calculated 1 64   O2 Saturation 96, RA     Signatures    Electronically signed by :  London Bates DO; 2018  3:29PM EST                       (Author)

## 2018-01-24 NOTE — RESULT NOTES
Message   Recorded as Task   Date: 01/18/2018 09:35 AM, Created By: Johan Bruner   Task Name: Follow Up   Assigned To: [facility] sched,Team   Regarding Patient: Noble Prader, Status: In Progress   Comment:    Johan Bruner - 18 Jan 2018 9:35 AM     TASK CREATED  please schedule RFA and follow up 6 weeks later with Yazmin Reyes - 18 Jan 2018 9:54 AM     TASK REASSIGNED: Previously Assigned To [id number] [place] [address] clinical,Team   Naomie Rogesr - 22 Jan 2018 9:11 AM     TASK EDITED  noted, will coordinate   Naomie Rogers - 23 Jan 2018 12:54 PM     TASK IN PROGRESS   Naomie Rogers 23 Jan 2018 12:55 PM     TASK EDITED  left message for patient to call me back   Naomie Rogers - 23 Jan 2018 3:03 PM     TASK EDITED  Patient called back and scheduled:     right L3-5 RFA on 2/21  left L3-5 RFA on 3/7  6w f/u with AO on 4/4    Reviewed instructions: , NPO 1 hour prior, loose-fitting pants, if ill/fever/infx/abx to call and reschedule  No need to hold any meds prior  She stated verbal understanding

## 2018-02-10 DIAGNOSIS — M54.5 CHRONIC BILATERAL LOW BACK PAIN, WITH SCIATICA PRESENCE UNSPECIFIED: Primary | ICD-10-CM

## 2018-02-10 DIAGNOSIS — G89.29 CHRONIC BILATERAL LOW BACK PAIN, WITH SCIATICA PRESENCE UNSPECIFIED: Primary | ICD-10-CM

## 2018-02-10 RX ORDER — TRAMADOL HYDROCHLORIDE 50 MG/1
100 TABLET ORAL SEE ADMIN INSTRUCTIONS
Qty: 210 TABLET | Refills: 0 | OUTPATIENT
Start: 2018-02-10 | End: 2018-03-12 | Stop reason: SDUPTHER

## 2018-02-10 RX ORDER — TRAMADOL HYDROCHLORIDE 50 MG/1
2 TABLET ORAL
COMMUNITY
Start: 2013-10-11 | End: 2018-02-10 | Stop reason: SDUPTHER

## 2018-02-12 DIAGNOSIS — M54.5 CHRONIC BILATERAL LOW BACK PAIN, WITH SCIATICA PRESENCE UNSPECIFIED: ICD-10-CM

## 2018-02-12 DIAGNOSIS — G89.29 CHRONIC BILATERAL LOW BACK PAIN, WITH SCIATICA PRESENCE UNSPECIFIED: ICD-10-CM

## 2018-02-21 ENCOUNTER — HOSPITAL ENCOUNTER (OUTPATIENT)
Dept: RADIOLOGY | Facility: MEDICAL CENTER | Age: 38
Discharge: HOME | End: 2018-02-21
Attending: PHYSICAL MEDICINE & REHABILITATION
Payer: COMMERCIAL

## 2018-02-21 ENCOUNTER — TELEPHONE (OUTPATIENT)
Dept: PAIN MEDICINE | Facility: MEDICAL CENTER | Age: 38
End: 2018-02-21

## 2018-02-21 VITALS
HEART RATE: 73 BPM | RESPIRATION RATE: 20 BRPM | DIASTOLIC BLOOD PRESSURE: 74 MMHG | TEMPERATURE: 98.5 F | SYSTOLIC BLOOD PRESSURE: 108 MMHG | OXYGEN SATURATION: 100 %

## 2018-02-21 DIAGNOSIS — M47.816 LUMBAR SPONDYLOSIS: ICD-10-CM

## 2018-02-21 PROCEDURE — 64636 DESTROY L/S FACET JNT ADDL: CPT | Performed by: PHYSICAL MEDICINE & REHABILITATION

## 2018-02-21 PROCEDURE — 64635 DESTROY LUMB/SAC FACET JNT: CPT | Performed by: PHYSICAL MEDICINE & REHABILITATION

## 2018-02-21 RX ORDER — BUPIVACAINE HCL/PF 2.5 MG/ML
10 VIAL (ML) INJECTION ONCE
Status: COMPLETED | OUTPATIENT
Start: 2018-02-21 | End: 2018-02-21

## 2018-02-21 RX ORDER — LIDOCAINE HYDROCHLORIDE 10 MG/ML
5 INJECTION, SOLUTION EPIDURAL; INFILTRATION; INTRACAUDAL; PERINEURAL ONCE
Status: COMPLETED | OUTPATIENT
Start: 2018-02-21 | End: 2018-02-21

## 2018-02-21 RX ADMIN — LIDOCAINE HYDROCHLORIDE 2 ML: 10 INJECTION, SOLUTION EPIDURAL; INFILTRATION; INTRACAUDAL; PERINEURAL at 10:36

## 2018-02-21 RX ADMIN — LIDOCAINE HYDROCHLORIDE 5 ML: 20 INJECTION, SOLUTION EPIDURAL; INFILTRATION; INTRACAUDAL; PERINEURAL at 10:41

## 2018-02-21 RX ADMIN — Medication 5 ML: at 10:47

## 2018-02-21 NOTE — H&P
History of Present Illness:  The patient is a 44 y o  female who presents with complaints of Right-sided axial lower back pain    Active Problems  Problems    1  Acute bronchitis, unspecified organism (466 0) (J20 9)   2  Basal cell carcinoma of back (173 51) (C44 519)   3  Chronic low back pain (724 2,338 29) (M54 5,G89 29)   4  Depression with anxiety (300 4) (F41 8)   5  Dysplastic nevus of trunk (216 5) (D22 5)   6  Insomnia (780 52) (G47 00)   7  Skin lesion of face (709 9) (L98 9)     Past Medical History  Problems    1  History of Abdominal pain, RUQ (789 01) (R10 11)   2  History of Acute bacterial conjunctivitis of left eye (372 03) (H10 32)   3  History of Acute upper respiratory infection (465 9) (J06 9)   4  History of Anxiety (300 00) (F41 9)   5  History of Breastfeeding (infant) (V49 89) (Z78 9)   6  History of Dysuria (788 1) (R30 0)   7  History of abdominal pain (V13 89) (T22 575)   8  History of acute bacterial sinusitis (V12 69) (Z87 09)   9  History of acute bacterial sinusitis (V12 69) (Z87 09)   10  History of acute sinusitis (V12 69) (Z87 09)   11  History of atopic dermatitis (V13 3) (Z87 2)   12  History of constipation (V12 79) (Z87 19)   13  History of fatigue (V13 89) (Z87 898)   14  History of insomnia (V13 89) (Z87 898)   15  History of low back pain (V13 59) (Z87 39)   16  History of low back pain (V13 59) (Z87 39)   17  History of low back pain (V13 59) (Z87 39)   18  History of urinary frequency (V13 09) (Z87 898)   19  History of Muscle strain of scapular region, left, initial encounter (840 9) (U52 543D)   20  History of Nasal mucosa dry (478 19) (J34 89)   21  History of Need for prophylactic vaccination and inoculation against influenza (V04 81)  (Z23)   22  History of Right wrist pain (719 43) (M25 531)   23  History of Sacroiliitis (720 2) (M46 1)   24  History of Screening for depression (V79 0) (Z13 89)   25  History of Skin lesion (709 9) (L98 9)   26  History of Symptoms of urinary tract infection (788 99) (R39 9)   27  History of Visit For: Medication Refill   28  History of Vitamin D deficiency (268 9) (E55 9)     Surgical History  Problems    1  History of  Section     Social History  Problems    · Denied: History of Alcohol Use (History)    Current Outpatient Prescriptions:     traMADol (ULTRAM) 50 mg tablet, Take 2 tablets (100 mg total) by mouth see administration instructions Take 2 Tablets by mouth 3 times a day and 1 tablet at Bedtime  , Disp: 210 tablet, Rfl: 0    No Known Allergies    Physical Exam:   Vitals:    18 1013   BP: 104/74   Pulse: 72   Resp: 20   Temp: 98 5 °F (36 9 °C)   SpO2: 96%     General: Awake, Alert, Oriented x 3, Mood and affect appropriate  Respiratory: Respirations even and unlabored  Cardiovascular: Peripheral pulses intact; no edema  Musculoskeletal Exam:  Right-sided axial lower back pain with extension and rotation    ASA Score:  1  Assessment:   1   Lumbar spondylosis        Plan: RT L3-5 RFA

## 2018-02-21 NOTE — DISCHARGE INSTRUCTIONS

## 2018-02-21 NOTE — TELEPHONE ENCOUNTER
To be called on 2/22/18    S/P Rt l3-5 RFA with JE on 2/21/18  Scheduled for a Left L3-5 RFA on 3/7/18 with JE  To arrive at 10:45

## 2018-02-22 NOTE — TELEPHONE ENCOUNTER
RN attempted to reach pt regarding previous  [facility] on home/cell ph# with c/b number office hours and location provided

## 2018-03-02 ENCOUNTER — TELEPHONE (OUTPATIENT)
Dept: RADIOLOGY | Facility: MEDICAL CENTER | Age: 38
End: 2018-03-02

## 2018-03-02 NOTE — TELEPHONE ENCOUNTER
Patient had right L3-5 RFA on 2/21  Called stating that she was very sore and wished to hold off on left side  Will keep her 6w f/u with Jeffrey Leblanc on 4/4 and have rescheduled left side to 4/9  She also noted that since RFA, she has had a headache  Please call to further assess       [phone]

## 2018-03-02 NOTE — TELEPHONE ENCOUNTER
RN s/w pt regarding above  Per pt she does not have "skin pain" or a burning sensation, per pt the pain on the rt side of her back is "just more intense" since the RFA  Pt denied s/s of infection at injection sites and no fever  Pt stated that she is taking her tramadol but almost feels like she had to take it "more often" since the RFA  Pt also stated that she uses tylenol as needed as well especially with her headache  RN advised pt that the headache is not caused by the RFA and that she should try to take ibuprofen which is an nsaid and will help with any inflammation that might be increasing her pain  RN advised since she doesn't usually take ibuprofen that she should start with 400 mg every 6 hours and take it with food  RN also advised that she may continue to alternate with tylenol prn and take her tramadol as ordered  Rn also advised that pt could use heat or ice and to try whichever one helps more  Rn advised pt that she would send this to [address] [place], [place] [address] and c/b with any more recs or suggestions for same      ---please advise on above--

## 2018-03-02 NOTE — TELEPHONE ENCOUNTER
Aware, if patient still having skin tenderness over weekend would like her to contact us Monday and we can call something in for her

## 2018-03-07 NOTE — PROCEDURES
Procedure    Indication: Mechanical low back pain  Preoperative diagnosis: 1  Lumbar spondylosis   2  Low back pain  Postoperative diagnosis: 1  Lumbar spondylosis   2  Low back pain    Procedure: Fluoroscopically-guided bilateral L3-5 Medial Branch Nerve/Dorsal Ramus Blocks using 2% lidocaine       After discussing the risks, benefits, and alternatives to the procedure, the patient expressed understanding and wished to proceed  The patient was brought to the fluoroscopy suite and placed in the prone position  Procedural pause conducted to verify: correct patient identity, procedure to be performed and as applicable, correct side and site, correct patient position, and availability of implants, special equipment and special requirements  Using fluoroscopy, the junction of the transverse process and superior articulating process of the bilateral L3-5 medial branch levels were identified  The skin was sterilely prepped and draped in the usual fashion using Chloraprep skin prep  Using fluoroscopic guidance, a 3 5 inch 25 gauge spinal needle was advanced to each target  After negative aspiration, 0 25 cc of Omnipaque 300 contrast was injected at each site  Spread of contrast approximated the region of the medial branches/dorsal ramus and there was no evidence of intravascular uptake  0 5cc of 2% lidocaine was injected at each site and the needles were then removed  The patient tolerated the procedure well and there were no apparent complications  After appropriate observation, the patient was dismissed from the clinic in good condition under their own power  The patient was instructed to keep a pain diary and report the results to our office               Signatures   Electronically signed by : Madi Salinas DO; Dec 11 2017  8:54AM EST                       (Author)

## 2018-03-12 DIAGNOSIS — G89.29 CHRONIC BILATERAL LOW BACK PAIN, WITH SCIATICA PRESENCE UNSPECIFIED: ICD-10-CM

## 2018-03-12 DIAGNOSIS — M54.5 CHRONIC BILATERAL LOW BACK PAIN, WITH SCIATICA PRESENCE UNSPECIFIED: ICD-10-CM

## 2018-03-12 RX ORDER — TRAMADOL HYDROCHLORIDE 50 MG/1
TABLET ORAL
Qty: 210 TABLET | Refills: 0 | OUTPATIENT
Start: 2018-03-12 | End: 2018-04-10 | Stop reason: SDUPTHER

## 2018-03-16 RX ORDER — TRAMADOL HYDROCHLORIDE 50 MG/1
TABLET ORAL
Qty: 210 TABLET | Refills: 0 | OUTPATIENT
Start: 2018-03-16

## 2018-03-30 ENCOUNTER — OFFICE VISIT (OUTPATIENT)
Dept: FAMILY MEDICINE CLINIC | Facility: CLINIC | Age: 38
End: 2018-03-30
Payer: COMMERCIAL

## 2018-03-30 VITALS
HEART RATE: 80 BPM | TEMPERATURE: 98.4 F | OXYGEN SATURATION: 97 % | SYSTOLIC BLOOD PRESSURE: 98 MMHG | BODY MASS INDEX: 23.39 KG/M2 | DIASTOLIC BLOOD PRESSURE: 70 MMHG | HEIGHT: 63 IN | WEIGHT: 132 LBS | RESPIRATION RATE: 16 BRPM

## 2018-03-30 DIAGNOSIS — J32.0 MAXILLARY SINUSITIS, UNSPECIFIED CHRONICITY: Primary | ICD-10-CM

## 2018-03-30 PROCEDURE — 99213 OFFICE O/P EST LOW 20 MIN: CPT | Performed by: FAMILY MEDICINE

## 2018-03-30 RX ORDER — SULFAMETHOXAZOLE AND TRIMETHOPRIM 800; 160 MG/1; MG/1
1 TABLET ORAL EVERY 12 HOURS SCHEDULED
Qty: 20 TABLET | Refills: 0 | Status: SHIPPED | OUTPATIENT
Start: 2018-03-30 | End: 2018-04-09

## 2018-03-30 NOTE — PROGRESS NOTES
Assessment/Plan:    No problem-specific Assessment & Plan notes found for this encounter  Diagnoses and all orders for this visit:    Maxillary sinusitis, unspecified chronicity  -     sulfamethoxazole-trimethoprim (BACTRIM DS) 800-160 mg per tablet; Take 1 tablet by mouth every 12 (twelve) hours for 10 days    Other orders  -     sertraline (ZOLOFT) 50 mg tablet; TAKE 1 TABLET DAILY AS DIRECTED  Subjective:      Patient ID: Reynaldo Mckeon is a 44 y o  female  1 week hx of sore throat  Her son was recently diagnosed w/ GABHS  The following portions of the patient's history were reviewed and updated as appropriate: allergies, current medications, past family history, past medical history, past social history, past surgical history and problem list     Review of Systems   Constitutional: Negative for chills  HENT: Positive for sore throat  Negative for congestion  Respiratory: Negative for cough and shortness of breath  Cardiovascular: Negative  Objective:      BP 98/70 (BP Location: Left arm, Patient Position: Sitting, Cuff Size: Adult)   Pulse 80   Temp 98 4 °F (36 9 °C) (Tympanic)   Resp 16   Ht 5' 2 99" (1 6 m)   Wt 59 9 kg (132 lb)   LMP 03/17/2018   SpO2 97%   Breastfeeding? No   BMI 23 39 kg/m²          Physical Exam   Constitutional: She appears well-developed and well-nourished  HENT:   P  Pharynx injected   Neck: Normal range of motion  Neck supple     Pulmonary/Chest: Effort normal and breath sounds normal

## 2018-04-10 ENCOUNTER — TELEPHONE (OUTPATIENT)
Dept: PAIN MEDICINE | Facility: CLINIC | Age: 38
End: 2018-04-10

## 2018-04-10 DIAGNOSIS — M54.5 CHRONIC BILATERAL LOW BACK PAIN, WITH SCIATICA PRESENCE UNSPECIFIED: ICD-10-CM

## 2018-04-10 DIAGNOSIS — G89.29 CHRONIC BILATERAL LOW BACK PAIN, WITH SCIATICA PRESENCE UNSPECIFIED: ICD-10-CM

## 2018-04-10 RX ORDER — TRAMADOL HYDROCHLORIDE 50 MG/1
TABLET ORAL
Qty: 210 TABLET | Refills: 2 | Status: SHIPPED | OUTPATIENT
Start: 2018-04-10 | End: 2018-06-14 | Stop reason: ALTCHOICE

## 2018-04-10 NOTE — TELEPHONE ENCOUNTER
Message in [place] received from Ruth Sinclair    Patient called, needs to rechedule 4/23 appt with Ruth Sinclair,  Left voice message for patient to call office and schedule appointment

## 2018-04-17 NOTE — TELEPHONE ENCOUNTER
4/23 is not the appt with [facility] that needs to be rescheduled  Patient was scheduled to see [facility] on 4/4 (6w f/u post RIGHT RFA)  She was scheduled for LEFT RFA on 4/9 and wished to keep the 4/4 f/u to discuss how she was doing  She cx d/t being ill  I have 2 messages out to her to reschedule the LEFT RFA and her f/u  Please direct any return calls to me to coordinate scheduling for her

## 2018-04-23 ENCOUNTER — OFFICE VISIT (OUTPATIENT)
Dept: PAIN MEDICINE | Facility: MEDICAL CENTER | Age: 38
End: 2018-04-23
Payer: COMMERCIAL

## 2018-04-23 VITALS — SYSTOLIC BLOOD PRESSURE: 90 MMHG | WEIGHT: 131 LBS | DIASTOLIC BLOOD PRESSURE: 60 MMHG | BODY MASS INDEX: 23.21 KG/M2

## 2018-04-23 DIAGNOSIS — M54.50 CHRONIC BILATERAL LOW BACK PAIN WITHOUT SCIATICA: Primary | ICD-10-CM

## 2018-04-23 DIAGNOSIS — G89.29 CHRONIC BILATERAL LOW BACK PAIN WITHOUT SCIATICA: Primary | ICD-10-CM

## 2018-04-23 DIAGNOSIS — M47.816 LUMBAR SPONDYLOSIS: ICD-10-CM

## 2018-04-23 PROCEDURE — 99213 OFFICE O/P EST LOW 20 MIN: CPT | Performed by: NURSE PRACTITIONER

## 2018-04-23 NOTE — PROGRESS NOTES
Pt is c/o lower back pain  Assessment:  1  Chronic bilateral low back pain without sciatica    2  Lumbar spondylosis        Plan: At this time, the patient is doing well following the right-sided L3-5 radiofrequency ablation  She tells me that she does not use her tramadol as consistently since having the procedure  With regards still left L3-5 radiofrequency ablation the patient like to hold off for now as she did have a lot of pain directly after the procedure  She will call the office when she is ready to schedule  Patient states that she is having a new onset right-sided neck and arm pain that just started, I did offer her a visit with Dr Jerome Thompson to be evaluated  She would like to hold off at this time since it just started  Follow-up for her low back as needed      [place] Prescription Drug Monitoring Program report was reviewed and was appropriate       History of Present Illness: The patient is a 44 y o  female who presents for a follow up office visit in regards to Back Pain  The patients current symptoms include low back pain rated 5/10, this is constant in nature and most bothersome in the morning when she wakes up  She describes her pain as dull, and aching  She does use tramadol as prescribed by Dr Riki Mckeon  I have personally reviewed and/or updated the patient's past medical history, past surgical history, family history, social history, current medications, allergies, and vital signs today  Review of Systems  Review of Systems   Respiratory: Negative for shortness of breath  Cardiovascular: Negative for chest pain  Gastrointestinal: Negative for constipation, diarrhea, nausea and vomiting  Musculoskeletal: Negative for arthralgias, gait problem, joint swelling and myalgias  Skin: Negative for rash  Neurological: Negative for dizziness, seizures and weakness  All other systems reviewed and are negative  Past Medical History  Problems    1   History of Abdominal pain, RUQ (789 01) (R10 11)   2  History of Acute bacterial conjunctivitis of left eye (372 03) (H10 32)   3  History of Acute upper respiratory infection (465 9) (J06 9)   4  History of Anxiety (300 00) (F41 9)   5  History of Breastfeeding (infant) (V49 89) (Z78 9)   6  History of Dysuria (788 1) (R30 0)   7  History of abdominal pain (V13 89) (Z87 898)   8  History of acute bacterial sinusitis (V12 69) (Z87 09)   9  History of acute bacterial sinusitis (V12 69) (Z87 09)   10  History of acute sinusitis (V12 69) (Z87 09)   11  History of atopic dermatitis (V13 3) (Z87 2)   12  History of constipation (V12 79) (Z87 19)   13  History of fatigue (V13 89) (Z87 898)   14  History of insomnia (V13 89) (Z87 898)   15  History of low back pain (V13 59) (Z87 39)   16  History of low back pain (V13 59) (Z87 39)   17  History of low back pain (V13 59) (Z87 39)   18  History of urinary frequency (V13 09) (Z87 898)   19  History of Muscle strain of scapular region, left, initial encounter (840 9) (S46 912A)   20  History of Nasal mucosa dry (478 19) (J34 89)   21  History of Need for prophylactic vaccination and inoculation against influenza (V04 81)  (Z23)   22  History of Right wrist pain (719 43) (M25 531)   23  History of Sacroiliitis (720 2) (M46 1)   24  History of Screening for depression (V79 0) (Z13 89)   25  History of Skin lesion (709 9) (L98 9)   26  History of Symptoms of urinary tract infection (788 99) (R39 9)   27  History of Visit For: Medication Refill   28  History of Vitamin D deficiency (268 9) (E55 9)     Surgical History  Problems    1  History of  Section     Social History  Problems    · Denied: History of Alcohol Use (History)    Social History     Occupational History    Not on file       Social History Main Topics    Smoking status: Never Smoker    Smokeless tobacco: Never Used    Alcohol use No    Drug use: No    Sexual activity: Not on file         Current Outpatient Prescriptions:     traMADol (ULTRAM) 50 mg tablet, TAKE 2 TABLETS BY MOUTH 3 TIMES A DAY AND 1 TABLET AT BEDTIME, Disp: 210 tablet, Rfl: 2    sertraline (ZOLOFT) 50 mg tablet, TAKE 1 TABLET DAILY AS DIRECTED , Disp: , Rfl:     No Known Allergies    Physical Exam:    BP 90/60   Wt 59 4 kg (131 lb)   BMI 23 21 kg/m²     Constitutional:normal, well developed, well nourished, alert, in no distress and non-toxic and no overt pain behavior  Eyes:anicteric  HEENT:grossly intact  Neck:supple, symmetric, trachea midline and no masses   Pulmonary:even and unlabored  Cardiovascular:No edema or pitting edema present  Skin:Normal without rashes or lesions and well hydrated  Psychiatric:Mood and affect appropriate  Neurologic:Cranial Nerves II-XII grossly intact  Musculoskeletal:normal   Lumbar Spine Exam    Appearance:  Normal lordosis  Palpation/Tenderness:  no tenderness or spasm    Range of Motion:  Full range of motion with no pain or limitations in flexion, extension, lateral flexion and rotation  Motor Strength:  Left hip flexion:  5/5  Left hip extension:  5/5  Right hip flexion:  5/5  Right hip extension:  5/5  Left knee flexion:  5/5  Left knee extension:  5/5  Right knee flexion:  5/5  Right knee extension:  5/5  Left foot dorsiflexion:  5/5  Left foot plantar flexion:  5/5  Right foot dorsiflexion:  5/5  Right foot plantar flexion:  5/5        Imaging  No orders to display     XR spine lumbar complete w bending minimum 6 views   Status: Final result   PACS Images     Show images for XR spine lumbar complete w bending minimum 6 views   Order Report      Order Details   Study Result     LUMBAR SPINE     INDICATION:  Chronic back pain      COMPARISON: MRI dated 11/17/2011      VIEWS:  AP, bilateral oblique, coned down and lateral projections in neutral, flexion and extension     IMAGES:  7     FINDINGS:     Grade 1 anterior spondylolisthesis of L5 upon S1 with chronic L5 spondylolysis    Flexion and extension imaging demonstrates slight motion  On flexion there is 8 mm of anterior spondylolisthesis and on extension there is 5 mm of spondylolisthesis      No fracture      Normal disc height      No significant lumbar degenerative change noted      Visualized soft tissues appear unremarkable      IMPRESSION:     Chronic L5 spondylolysis with mild grade 1 anterior spondylolisthesis  Slight instability upon flexion and extension with measurements described above         Workstation performed: [id number]          No orders of the defined types were placed in this encounter

## 2018-04-25 ENCOUNTER — OFFICE VISIT (OUTPATIENT)
Dept: FAMILY MEDICINE CLINIC | Facility: CLINIC | Age: 38
End: 2018-04-25
Payer: COMMERCIAL

## 2018-04-25 VITALS
DIASTOLIC BLOOD PRESSURE: 66 MMHG | HEART RATE: 95 BPM | WEIGHT: 130.2 LBS | SYSTOLIC BLOOD PRESSURE: 94 MMHG | TEMPERATURE: 98.4 F | OXYGEN SATURATION: 98 % | BODY MASS INDEX: 23.07 KG/M2

## 2018-04-25 DIAGNOSIS — M54.2 CERVICALGIA: Primary | ICD-10-CM

## 2018-04-25 DIAGNOSIS — Z13.29 SCREENING FOR THYROID DISORDER: ICD-10-CM

## 2018-04-25 DIAGNOSIS — R53.83 OTHER FATIGUE: ICD-10-CM

## 2018-04-25 DIAGNOSIS — Z13.0 SCREENING FOR IRON DEFICIENCY ANEMIA: ICD-10-CM

## 2018-04-25 DIAGNOSIS — Z13.1 SCREENING FOR DIABETES MELLITUS: ICD-10-CM

## 2018-04-25 PROCEDURE — 99214 OFFICE O/P EST MOD 30 MIN: CPT | Performed by: FAMILY MEDICINE

## 2018-04-25 RX ORDER — CYCLOBENZAPRINE HCL 5 MG
5 TABLET ORAL
Qty: 20 TABLET | Refills: 0 | Status: SHIPPED | OUTPATIENT
Start: 2018-04-25 | End: 2018-06-14 | Stop reason: ALTCHOICE

## 2018-04-25 NOTE — PROGRESS NOTES
Assessment/Plan:   1  Cervicalgia  Patient's symptoms today appear likely secondary to Cervicalgia/lower trap strain  She was educated on the pathophysiology is problem  At this time, hold off on checking x-ray imaging  She was advised on the importance of stretching as well as a massage to this area  She may use a heating pad  Start treatment with Flexeril 5 mg q h s   Follow up if any symptoms are persisting    - cyclobenzaprine (FLEXERIL) 5 mg tablet; Take 1 tablet (5 mg total) by mouth daily at bedtime  Dispense: 20 tablet; Refill: 0    2  Other fatigue  Symptoms today are unclear  She has been having persistent fatigue for the past few weeks  At this time, check blood work including CBC, TSH, CMP as well as her iron panel   - CBC and Platelet; Future  - TSH, 3rd generation with T4 reflex; Future  - Iron Panel; Future    3  Screening for thyroid disorder    - TSH, 3rd generation with T4 reflex; Future    4  Screening for iron deficiency anemia    - CBC and Platelet; Future  - Iron Panel; Future    5  Screening for diabetes mellitus    - Comprehensive metabolic panel; Future     There are no diagnoses linked to this encounter  Subjective:    Chief Complaint   Patient presents with    Shoulder Pain     R shoulder, radiates into R wrist x 1 week  Patient ID: Rodrick Russell is a 44 y o  female  Shoulder Pain    The pain is present in the right elbow, right arm and right shoulder  This is a new problem  The current episode started in the past 7 days  The quality of the pain is described as aching  The pain is at a severity of 4/10  Pertinent negatives include no fever or numbness  She has tried NSAIDS and acetaminophen for the symptoms  The treatment provided no relief  Review of Systems   Constitutional: Negative for activity change, chills, fatigue and fever  HENT: Negative for congestion, ear pain, sinus pressure and sore throat      Eyes: Negative for redness, itching and visual disturbance  Respiratory: Negative for cough and shortness of breath  Cardiovascular: Negative for chest pain and palpitations  Gastrointestinal: Negative for abdominal pain, diarrhea and nausea  Endocrine: Negative for cold intolerance and heat intolerance  Genitourinary: Negative for dysuria, flank pain and frequency  Musculoskeletal: Positive for arthralgias and back pain  Negative for gait problem and myalgias  Skin: Negative for color change  Allergic/Immunologic: Negative for environmental allergies  Neurological: Negative for dizziness, numbness and headaches  Psychiatric/Behavioral: Negative for behavioral problems and sleep disturbance  The following portions of the patient's history were reviewed and updated as appropriate : past family history, past medical history, past social history and past surgical history  Objective:    Vitals:    04/25/18 0829   BP: 94/66   BP Location: Left arm   Patient Position: Sitting   Pulse: 95   Temp: 98 4 °F (36 9 °C)   TempSrc: Tympanic   SpO2: 98%   Weight: 59 1 kg (130 lb 3 2 oz)        Physical Exam   Constitutional: She is oriented to person, place, and time  She appears well-developed and well-nourished  HENT:   Head: Normocephalic and atraumatic  Nose: Nose normal    Mouth/Throat: No oropharyngeal exudate  Eyes: Pupils are equal, round, and reactive to light  Right eye exhibits no discharge  Left eye exhibits no discharge  Neck: Normal range of motion  Neck supple  No tracheal deviation present  Cardiovascular: Normal rate, regular rhythm and intact distal pulses  Exam reveals no gallop and no friction rub  No murmur heard  Pulses:       Dorsalis pedis pulses are 2+ on the right side, and 2+ on the left side  Posterior tibial pulses are 2+ on the right side, and 2+ on the left side  Pulmonary/Chest: Effort normal and breath sounds normal  No respiratory distress  She has no wheezes  She has no rales  Abdominal: Soft  Bowel sounds are normal  She exhibits no distension  There is no tenderness  There is no rebound and no guarding  Musculoskeletal: Normal range of motion  She exhibits no edema  Lymphadenopathy:        Head (right side): No submental and no submandibular adenopathy present  Head (left side): No submental and no submandibular adenopathy present  She has no cervical adenopathy  Right cervical: No superficial cervical, no deep cervical and no posterior cervical adenopathy present  Left cervical: No superficial cervical, no deep cervical and no posterior cervical adenopathy present  Neurological: She is alert and oriented to person, place, and time  No cranial nerve deficit or sensory deficit  Skin: Skin is warm, dry and intact  Psychiatric: Her speech is normal and behavior is normal  Judgment normal  Her mood appears not anxious  Cognition and memory are normal  She does not exhibit a depressed mood  Vitals reviewed

## 2018-05-11 NOTE — TELEPHONE ENCOUNTER
Messages left for patient on 4/10, 4/11 (returning call) and most recently on 5/3  Please send Unable to Reach letter

## 2018-05-21 ENCOUNTER — OFFICE VISIT (OUTPATIENT)
Dept: FAMILY MEDICINE CLINIC | Facility: CLINIC | Age: 38
End: 2018-05-21
Payer: COMMERCIAL

## 2018-05-21 VITALS
TEMPERATURE: 98.5 F | RESPIRATION RATE: 16 BRPM | HEIGHT: 63 IN | DIASTOLIC BLOOD PRESSURE: 70 MMHG | BODY MASS INDEX: 23.04 KG/M2 | SYSTOLIC BLOOD PRESSURE: 100 MMHG | OXYGEN SATURATION: 97 % | HEART RATE: 73 BPM | WEIGHT: 130 LBS

## 2018-05-21 DIAGNOSIS — J02.9 SORE THROAT: Primary | ICD-10-CM

## 2018-05-21 PROBLEM — G47.00 INSOMNIA: Status: ACTIVE | Noted: 2017-05-22

## 2018-05-21 PROBLEM — M43.06 LUMBAR PARS DEFECT: Status: ACTIVE | Noted: 2017-11-28

## 2018-05-21 LAB — S PYO AG THROAT QL: NEGATIVE

## 2018-05-21 PROCEDURE — 87880 STREP A ASSAY W/OPTIC: CPT | Performed by: PHYSICIAN ASSISTANT

## 2018-05-21 PROCEDURE — 99213 OFFICE O/P EST LOW 20 MIN: CPT | Performed by: PHYSICIAN ASSISTANT

## 2018-05-21 NOTE — PROGRESS NOTES
Assessment/Plan:      Diagnoses and all orders for this visit:    Sore throat  -     POCT rapid strepA        Patient is a 44year old female presenting today for a few days of sore throat and not feeling well  Her children were diagnosed with strep pharyngitis  She is afebrile and on exam there is no obvious abnormal findings consistent with strep pharyngitis  A rapid strep test was performed and was also negative  There is no clinical suspicion for strep at this time  I have advised patient to start Tylenol/ Motrin, warm tea with honey to soothe the throat as well as saltwater gargles, Chloraseptic spray and throat lozenges for symptomatic treatment  She is to monitor symptoms closely and should call or follow up should symptoms persist or worsen or any new concerning symptoms arise that might require different treatment  Chief Complaint   Patient presents with    Sore Throat       Subjective:     Patient ID: Ivis Mcclellan is a 44 y o  female  36y/o female here today for sore throat past few days, also not feeling well, sweats, things not tasting good  Kids diagnosed with strep  Review of Systems   Constitutional:        As in HPI   HENT: Positive for sore throat  Respiratory: Negative  Cardiovascular: Negative  Gastrointestinal: Negative  Neurological: Negative  Psychiatric/Behavioral: Negative  The following portions of the patient's history were reviewed and updated as appropriate: allergies, current medications, past family history, past medical history, past social history, past surgical history and problem list       Objective:     Physical Exam   Constitutional: She is oriented to person, place, and time  She appears well-developed and well-nourished  She does not appear ill  No distress  Appears mildly tired   HENT:   Head: Normocephalic     Right Ear: Tympanic membrane and ear canal normal    Left Ear: Tympanic membrane and ear canal normal    Nose: Nose normal    Mouth/Throat: Oropharynx is clear and moist  No oropharyngeal exudate, posterior oropharyngeal edema or posterior oropharyngeal erythema  Neck: Neck supple  Cardiovascular: Normal rate, regular rhythm and normal heart sounds  Pulmonary/Chest: Effort normal and breath sounds normal    Lymphadenopathy:   No LAD   Neurological: She is alert and oriented to person, place, and time  Psychiatric: She has a normal mood and affect  Vitals reviewed        Vitals:    05/21/18 1337   BP: 100/70   BP Location: Left arm   Patient Position: Sitting   Cuff Size: Adult   Pulse: 73   Resp: 16   Temp: 98 5 °F (36 9 °C)   TempSrc: Tympanic   SpO2: 97%   Weight: 59 kg (130 lb)   Height: 5' 2 99" (1 6 m)

## 2018-06-13 ENCOUNTER — TELEPHONE (OUTPATIENT)
Dept: FAMILY MEDICINE CLINIC | Facility: CLINIC | Age: 38
End: 2018-06-13

## 2018-06-13 NOTE — TELEPHONE ENCOUNTER
Pt called she wants to follow up with Dr Arndt   Pt did do an e visit with her insurance company I will call and ask if I can request records from [facility] she used a service called [employer]  I did ask Magdaleno Brittle to please bring the records if she was given any from the encounter  They  can only fax us the report if the patient herself calls in I spoke to [address] [place] [address]  Pt has to call  I called and spoke to Magdaleno Brittle she actually sent in to us in My chart

## 2018-06-14 ENCOUNTER — OFFICE VISIT (OUTPATIENT)
Dept: FAMILY MEDICINE CLINIC | Facility: CLINIC | Age: 38
End: 2018-06-14
Payer: COMMERCIAL

## 2018-06-14 VITALS
OXYGEN SATURATION: 97 % | HEART RATE: 89 BPM | HEIGHT: 62 IN | DIASTOLIC BLOOD PRESSURE: 70 MMHG | RESPIRATION RATE: 16 BRPM | SYSTOLIC BLOOD PRESSURE: 102 MMHG | WEIGHT: 127 LBS | TEMPERATURE: 99.1 F | BODY MASS INDEX: 23.37 KG/M2

## 2018-06-14 DIAGNOSIS — J01.00 ACUTE NON-RECURRENT MAXILLARY SINUSITIS: Primary | ICD-10-CM

## 2018-06-14 DIAGNOSIS — J02.9 PHARYNGITIS, UNSPECIFIED ETIOLOGY: ICD-10-CM

## 2018-06-14 DIAGNOSIS — R59.0 CERVICAL ADENOPATHY: ICD-10-CM

## 2018-06-14 PROCEDURE — 99214 OFFICE O/P EST MOD 30 MIN: CPT | Performed by: FAMILY MEDICINE

## 2018-06-14 RX ORDER — CEFUROXIME AXETIL 500 MG/1
500 TABLET ORAL EVERY 12 HOURS SCHEDULED
Qty: 20 TABLET | Refills: 0 | Status: SHIPPED | OUTPATIENT
Start: 2018-06-14 | End: 2018-06-19 | Stop reason: ALTCHOICE

## 2018-06-14 RX ORDER — TRAMADOL HYDROCHLORIDE 50 MG/1
50 TABLET ORAL EVERY 6 HOURS PRN
COMMUNITY
End: 2018-07-12 | Stop reason: SDUPTHER

## 2018-06-14 RX ORDER — AMOXICILLIN 875 MG/1
875 TABLET, COATED ORAL 2 TIMES DAILY
Refills: 0 | COMMUNITY
Start: 2018-05-24 | End: 2018-06-14 | Stop reason: ALTCHOICE

## 2018-06-14 NOTE — ASSESSMENT & PLAN NOTE
Patient presents with ongoing sore throat cough congestion and drainage  She was evaluated by Josseline Liao  And given amoxicillin with some improvement, but symptoms hae  Persisted  Will give Rx for Ceftin 500 b i d  Times 10 days  Drink plenty fluids    Call further problems    Note:  Patient is leaving for [facility] tomorrow

## 2018-06-14 NOTE — ASSESSMENT & PLAN NOTE
Patient has right anterior cervical node/nodule present for the past 10 years  She states that was imaged at that time but thinks it is more firm and possibly larger since then  Patient denies any B  Symptoms  Will send for repeat CT scan of soft tissue of neck    Will call with results

## 2018-06-18 ENCOUNTER — TELEPHONE (OUTPATIENT)
Dept: FAMILY MEDICINE CLINIC | Facility: CLINIC | Age: 38
End: 2018-06-18

## 2018-06-18 NOTE — TELEPHONE ENCOUNTER
Pt said the antibiotic that she was given the other day is making her sick to her stomach and she has headaches  Can another rx maybe for a zpack be sent to [facility] [place] [address]  She is on vacation

## 2018-06-19 ENCOUNTER — TELEPHONE (OUTPATIENT)
Dept: FAMILY MEDICINE CLINIC | Facility: CLINIC | Age: 38
End: 2018-06-19

## 2018-06-19 ENCOUNTER — DOCUMENTATION (OUTPATIENT)
Dept: FAMILY MEDICINE CLINIC | Facility: CLINIC | Age: 38
End: 2018-06-19

## 2018-06-19 DIAGNOSIS — R59.0 CERVICAL ADENOPATHY: Primary | ICD-10-CM

## 2018-06-19 DIAGNOSIS — J01.00 ACUTE NON-RECURRENT MAXILLARY SINUSITIS: ICD-10-CM

## 2018-06-19 RX ORDER — AZITHROMYCIN 250 MG/1
TABLET, FILM COATED ORAL
Qty: 6 TABLET | Refills: 0 | Status: SHIPPED | OUTPATIENT
Start: 2018-06-19 | End: 2018-06-24

## 2018-06-19 NOTE — TELEPHONE ENCOUNTER
INSURANCE HAS DENIED CT  IT LOOKS LIKE THEY PREFER AN US FIRST  WHAT DO YOU WANT TO DO   I DID NOT GET THE ACTUAL DENIAL LETTER YET

## 2018-06-19 NOTE — TELEPHONE ENCOUNTER
I apologize pt would like Rx sent to [facility] on [address], [place], [place]

## 2018-06-28 ENCOUNTER — HOSPITAL ENCOUNTER (OUTPATIENT)
Dept: ULTRASOUND IMAGING | Facility: MEDICAL CENTER | Age: 38
Discharge: HOME | End: 2018-06-28
Payer: COMMERCIAL

## 2018-06-28 DIAGNOSIS — R59.0 CERVICAL ADENOPATHY: ICD-10-CM

## 2018-06-28 PROCEDURE — 76536 US EXAM OF HEAD AND NECK: CPT

## 2018-07-12 DIAGNOSIS — G89.29 CHRONIC BACK PAIN, UNSPECIFIED BACK LOCATION, UNSPECIFIED BACK PAIN LATERALITY: Primary | ICD-10-CM

## 2018-07-12 DIAGNOSIS — M54.9 CHRONIC BACK PAIN, UNSPECIFIED BACK LOCATION, UNSPECIFIED BACK PAIN LATERALITY: Primary | ICD-10-CM

## 2018-07-12 RX ORDER — TRAMADOL HYDROCHLORIDE 50 MG/1
50 TABLET ORAL EVERY 6 HOURS PRN
Qty: 30 TABLET | Refills: 0 | Status: SHIPPED | OUTPATIENT
Start: 2018-07-12 | End: 2018-07-16 | Stop reason: SDUPTHER

## 2018-07-15 DIAGNOSIS — G89.29 CHRONIC BACK PAIN, UNSPECIFIED BACK LOCATION, UNSPECIFIED BACK PAIN LATERALITY: ICD-10-CM

## 2018-07-15 DIAGNOSIS — M54.9 CHRONIC BACK PAIN, UNSPECIFIED BACK LOCATION, UNSPECIFIED BACK PAIN LATERALITY: ICD-10-CM

## 2018-07-15 NOTE — TELEPHONE ENCOUNTER
Patient needs a refill on her Tramadol she did not get the one at [facility] please cancel her Rx should be    TRAMADOL HCI - 50 MG ORAL TABLET -210 TABLETS -O REFILL- TAKE 2 TABLET BY MOUTH 3 TIMES A DAY AND TAKE 1 AT BEDTIME AS NEEDED- PLEASE SEND TO [facility]

## 2018-07-16 RX ORDER — TRAMADOL HYDROCHLORIDE 50 MG/1
50 TABLET ORAL EVERY 6 HOURS PRN
Qty: 210 TABLET | Refills: 0 | Status: SHIPPED | OUTPATIENT
Start: 2018-07-16 | End: 2018-07-27 | Stop reason: SDUPTHER

## 2018-07-27 ENCOUNTER — OFFICE VISIT (OUTPATIENT)
Dept: FAMILY MEDICINE CLINIC | Facility: CLINIC | Age: 38
End: 2018-07-27
Payer: COMMERCIAL

## 2018-07-27 VITALS
DIASTOLIC BLOOD PRESSURE: 64 MMHG | OXYGEN SATURATION: 98 % | BODY MASS INDEX: 23.17 KG/M2 | SYSTOLIC BLOOD PRESSURE: 102 MMHG | TEMPERATURE: 97.4 F | WEIGHT: 126.7 LBS | HEART RATE: 83 BPM

## 2018-07-27 DIAGNOSIS — N30.90 CYSTITIS: ICD-10-CM

## 2018-07-27 DIAGNOSIS — M54.9 CHRONIC BACK PAIN, UNSPECIFIED BACK LOCATION, UNSPECIFIED BACK PAIN LATERALITY: ICD-10-CM

## 2018-07-27 DIAGNOSIS — M54.5 CHRONIC LOW BACK PAIN, UNSPECIFIED BACK PAIN LATERALITY, WITH SCIATICA PRESENCE UNSPECIFIED: ICD-10-CM

## 2018-07-27 DIAGNOSIS — R59.0 CERVICAL ADENOPATHY: ICD-10-CM

## 2018-07-27 DIAGNOSIS — R39.9 SYMPTOMS INVOLVING URINARY SYSTEM: Primary | ICD-10-CM

## 2018-07-27 DIAGNOSIS — G89.29 CHRONIC BACK PAIN, UNSPECIFIED BACK LOCATION, UNSPECIFIED BACK PAIN LATERALITY: ICD-10-CM

## 2018-07-27 DIAGNOSIS — G89.29 CHRONIC LOW BACK PAIN, UNSPECIFIED BACK PAIN LATERALITY, WITH SCIATICA PRESENCE UNSPECIFIED: ICD-10-CM

## 2018-07-27 DIAGNOSIS — C44.519 BASAL CELL CARCINOMA OF BACK: ICD-10-CM

## 2018-07-27 LAB
SL AMB  POCT GLUCOSE, UA: NORMAL
SL AMB LEUKOCYTE ESTERASE,UA: NORMAL
SL AMB POCT BILIRUBIN,UA: NORMAL
SL AMB POCT BLOOD,UA: NORMAL
SL AMB POCT CLARITY,UA: CLEAR
SL AMB POCT COLOR,UA: YELLOW
SL AMB POCT KETONES,UA: NORMAL
SL AMB POCT NITRITE,UA: NORMAL
SL AMB POCT PH,UA: 6
SL AMB POCT SPECIFIC GRAVITY,UA: 1.02
SL AMB POCT URINE PROTEIN: NORMAL
SL AMB POCT UROBILINOGEN: 0.2

## 2018-07-27 PROCEDURE — 81003 URINALYSIS AUTO W/O SCOPE: CPT | Performed by: FAMILY MEDICINE

## 2018-07-27 PROCEDURE — 99214 OFFICE O/P EST MOD 30 MIN: CPT | Performed by: FAMILY MEDICINE

## 2018-07-27 RX ORDER — TRAMADOL HYDROCHLORIDE 50 MG/1
TABLET ORAL
Qty: 150 TABLET | Refills: 0 | Status: SHIPPED | OUTPATIENT
Start: 2018-07-27 | End: 2018-08-24 | Stop reason: SDUPTHER

## 2018-07-27 RX ORDER — PHENAZOPYRIDINE HYDROCHLORIDE 200 MG/1
200 TABLET, FILM COATED ORAL
Qty: 15 TABLET | Refills: 0 | Status: SHIPPED | OUTPATIENT
Start: 2018-07-27 | End: 2018-08-27 | Stop reason: ALTCHOICE

## 2018-07-27 NOTE — PROGRESS NOTES
Assessment/Plan:    No problem-specific Assessment & Plan notes found for this encounter  Diagnoses and all orders for this visit:    Symptoms involving urinary system  -     POCT urine dip auto non-scope    Chronic low back pain, unspecified back pain laterality, with sciatica presence unspecified    Cervical adenopathy    Basal cell carcinoma of back          Subjective:      Patient ID: Francisco Javier Turcios is a 44 y o  female       Patient presents with        The following portions of the patient's history were reviewed and updated as appropriate: allergies, current medications, past family history, past medical history, past social history, past surgical history and problem list     Review of Systems      Objective:      /64 (BP Location: Left arm, Patient Position: Sitting)   Pulse 83   Temp (!) 97 4 °F (36 3 °C) (Tympanic)   Wt 57 5 kg (126 lb 11 2 oz)   LMP 07/23/2018   SpO2 98%   BMI 23 17 kg/m²          Physical Exam

## 2018-07-27 NOTE — ASSESSMENT & PLAN NOTE
Still with daily pain, but reasonably controlled on tramadol 50 mg, 5 tablets daily  Med refilled today patient is status post RFA with minimal improvement    Continue follow-up with pain management as directed

## 2018-07-27 NOTE — ASSESSMENT & PLAN NOTE
Patient with history of ongoing right cervical node present for over 10 years  No changes  Patient denies any fevers, chills, night sweats, weight loss  Recent ultrasound of soft tissue of neck was unremarkable     Recommend continued watchful waiting  If any symptoms develop or if lesion increases in size, will refer for biopsy

## 2018-07-27 NOTE — PROGRESS NOTES
Assessment/Plan:    Basal cell carcinoma of back   Patient with history of multiple basal cell carcinomas  Continue regular follow-up with Dr Andrea Tavarez as directed    Cervical adenopathy   Patient with history of ongoing right cervical node present for over 10 years  No changes  Patient denies any fevers, chills, night sweats, weight loss  Recent ultrasound of soft tissue of neck was unremarkable     Recommend continued watchful waiting  If any symptoms develop or if lesion increases in size, will refer for biopsy  Chronic low back pain   Still with daily pain, but reasonably controlled on tramadol 50 mg, 5 tablets daily  Med refilled today patient is status post RFA with minimal improvement  Continue follow-up with pain management as directed    Cystitis   Urinalysis dip was negative  Will give Rx for Pyridium 200 t i d  x5 days  Patient encouraged drink plenty of fluids  If symptoms persist, will repeat urinalysis with culture  Diagnoses and all orders for this visit:    Symptoms involving urinary system  -     POCT urine dip auto non-scope    Chronic low back pain, unspecified back pain laterality, with sciatica presence unspecified    Cervical adenopathy    Basal cell carcinoma of back    Cystitis  -     phenazopyridine (PYRIDIUM) 200 mg tablet; Take 1 tablet (200 mg total) by mouth 3 (three) times a day with meals    Chronic back pain, unspecified back location, unspecified back pain laterality  -     traMADol (ULTRAM) 50 mg tablet; 1 tab q 4h prn pain (max 5 tabs/day)          Subjective:      Patient ID: Henrietta Espinoza is a 44 y o  female  5 day hx  B/l LBP  W/ urinary frequency  No fevers  It feels somewhat different than her usual back pain  as far as her sacroiliitis /spondylitis is concerned, pain is fairly well controlled on tramadol 5 tablets daily    She is still seeing pain management        The following portions of the patient's history were reviewed and updated as appropriate: allergies, current medications, past family history, past medical history, past social history, past surgical history and problem list     Review of Systems   Respiratory: Negative  Cardiovascular: Negative  Gastrointestinal: Negative  Genitourinary: Negative  Objective:      /64 (BP Location: Left arm, Patient Position: Sitting)   Pulse 83   Temp (!) 97 4 °F (36 3 °C) (Tympanic)   Wt 57 5 kg (126 lb 11 2 oz)   LMP 07/23/2018   SpO2 98%   BMI 23 17 kg/m²          Physical Exam   Musculoskeletal:    Point tenderness bilateral lumbar region mild reduction in range of motion    No CVA tenderness

## 2018-07-27 NOTE — ASSESSMENT & PLAN NOTE
Patient with history of multiple basal cell carcinomas    Continue regular follow-up with Dr Gianluca Flannery as directed

## 2018-07-27 NOTE — ASSESSMENT & PLAN NOTE
Urinalysis dip was negative  Will give Rx for Pyridium 200 t i d  x5 days  Patient encouraged drink plenty of fluids  If symptoms persist, will repeat urinalysis with culture

## 2018-07-30 ENCOUNTER — TELEPHONE (OUTPATIENT)
Dept: FAMILY MEDICINE CLINIC | Facility: CLINIC | Age: 38
End: 2018-07-30

## 2018-07-30 NOTE — TELEPHONE ENCOUNTER
Patient called concerning her medication tramadol  She said you told her to call you, Dr Xiomara David , if she had trouble  with the pharmacy and you would have someone call her pharmacy  [facility] pharmacy  [place] [address] [address] [place] [address]  She said you know all about the problem? Tramadol Ultram 50 mg tablet   150 tablets quantity  Take 2 tablets by mouth 3 times a day and 1 tablet at bedtime  She did not know if she wanted a 30 day or 90 day with 1 refill

## 2018-07-31 NOTE — TELEPHONE ENCOUNTER
Spoke to Pharmacist at [facility] and discussed Pt should be filling her Tramadol Monthly at a qty of 150 with a max of 5 pills per day  I also called Pt and made her aware that this has been fixed and there should be no more issues to fill Rx  I also spoke with Dr Edita Ware to authorize an early refill for Pt since the amount did not add up with her previous Rx      Tramadol will be filled today

## 2018-07-31 NOTE — TELEPHONE ENCOUNTER
Pt called again stated pharmacy is giving her a hard time with tramadol  She was originally taking 7 tablets a day  Than a script was send for 4 a day for 30 day  Now she is on 5 tab a day  They are saying pt is taking too many pills  Pt would like a call from doctor directly  To clarify issue

## 2018-07-31 NOTE — TELEPHONE ENCOUNTER
Call patient  The last prescription I wrote for her states 5 tablets per day (#150/month)    We will notify pharmacy    MA:  PLEASE CALL PHARMACY WITH THIS INFORMATION

## 2018-08-07 DIAGNOSIS — R10.30 LOWER ABDOMINAL PAIN: Primary | ICD-10-CM

## 2018-08-08 ENCOUNTER — TELEPHONE (OUTPATIENT)
Dept: FAMILY MEDICINE CLINIC | Facility: CLINIC | Age: 38
End: 2018-08-08

## 2018-08-08 NOTE — TELEPHONE ENCOUNTER
Delmis Brice called our office back, I told her she was to repeat the urine test and she has an order for it at [facility] per Dr Terry Peralta   Patient has an appointment tomorrow, I told her I would put the order in the  drawer

## 2018-08-09 ENCOUNTER — OFFICE VISIT (OUTPATIENT)
Dept: FAMILY MEDICINE CLINIC | Facility: CLINIC | Age: 38
End: 2018-08-09
Payer: COMMERCIAL

## 2018-08-09 VITALS
OXYGEN SATURATION: 97 % | HEART RATE: 71 BPM | RESPIRATION RATE: 17 BRPM | DIASTOLIC BLOOD PRESSURE: 64 MMHG | BODY MASS INDEX: 22.29 KG/M2 | TEMPERATURE: 99.4 F | SYSTOLIC BLOOD PRESSURE: 100 MMHG | WEIGHT: 125.8 LBS | HEIGHT: 63 IN

## 2018-08-09 DIAGNOSIS — G47.00 INSOMNIA, UNSPECIFIED TYPE: ICD-10-CM

## 2018-08-09 DIAGNOSIS — R10.9 FLANK PAIN: ICD-10-CM

## 2018-08-09 DIAGNOSIS — M47.816 LUMBAR SPONDYLOSIS: Primary | ICD-10-CM

## 2018-08-09 LAB
SL AMB  POCT GLUCOSE, UA: ABNORMAL
SL AMB LEUKOCYTE ESTERASE,UA: ABNORMAL
SL AMB POCT BILIRUBIN,UA: ABNORMAL
SL AMB POCT BLOOD,UA: ABNORMAL
SL AMB POCT CLARITY,UA: CLEAR
SL AMB POCT COLOR,UA: YELLOW
SL AMB POCT KETONES,UA: ABNORMAL
SL AMB POCT NITRITE,UA: ABNORMAL
SL AMB POCT PH,UA: 6
SL AMB POCT SPECIFIC GRAVITY,UA: 1.03
SL AMB POCT URINE PROTEIN: ABNORMAL
SL AMB POCT UROBILINOGEN: ABNORMAL

## 2018-08-09 PROCEDURE — 81003 URINALYSIS AUTO W/O SCOPE: CPT | Performed by: FAMILY MEDICINE

## 2018-08-09 PROCEDURE — 81002 URINALYSIS NONAUTO W/O SCOPE: CPT | Performed by: FAMILY MEDICINE

## 2018-08-09 PROCEDURE — 99214 OFFICE O/P EST MOD 30 MIN: CPT | Performed by: FAMILY MEDICINE

## 2018-08-09 RX ORDER — TRAZODONE HYDROCHLORIDE 50 MG/1
50 TABLET ORAL
Qty: 30 TABLET | Refills: 0 | Status: SHIPPED | OUTPATIENT
Start: 2018-08-09 | End: 2018-08-24 | Stop reason: SINTOL

## 2018-08-09 NOTE — PROGRESS NOTES
Assessment/Plan:    Lumbar spondylosis   Patient presents for recheck of low back pain  She is still having ongoing pain, but this is somewhat different than her usual pain (which is usually in the midline of her back )  Current pain is bilateral flank /bilateral lumbar region  Still with some urinary discomfort  She also is noticing more frequent bowel movements recently  Denies any blood in stool  She is needing to take more tramadol recently due to this pain ( up to 8 tablets daily)  Repeat urinalysis today showed trace protein, otherwise negative will sent for urine culture     Will call with results  If negative, consider sending for CT of abdomen and pelvis for stone study  Will continue tramadol ( may need to increase from 5 tabs daily to 7 )  Insomnia    Will start trazodone 50 mg nightly  Patient may increase to 100 nightly if needed after 1 week  Call further problems       Diagnoses and all orders for this visit:    Lumbar spondylosis    Flank pain  -     POCT urine dip  -     Cancel: Urine culture  -     Urine culture    Insomnia, unspecified type  -     traZODone (DESYREL) 50 mg tablet; Take 1 tablet (50 mg total) by mouth daily at bedtime          Subjective:      Patient ID: Esther Street is a 44 y o  female  Patient presents for recheck of low back pain  She is still having ongoing pain, but this is somewhat different than her usual pain (which is usually in the midline of her back )  Current pain is bilateral flank /bilateral lumbar region  Still with some urinary discomfort  She also is noticing more frequent bowel movements recently  Denies any blood in stool          The following portions of the patient's history were reviewed and updated as appropriate: allergies, current medications, past family history, past medical history, past social history, past surgical history and problem list     Review of Systems      Objective:      /64 (BP Location: Left arm, Patient Position: Sitting, Cuff Size: Standard)   Pulse 71   Temp 99 4 °F (37 4 °C) (Tympanic)   Resp 17   Ht 5' 3 39" (1 61 m)   Wt 57 1 kg (125 lb 12 8 oz)   LMP 07/23/2018   SpO2 97%   BMI 22 01 kg/m²          Physical Exam

## 2018-08-09 NOTE — ASSESSMENT & PLAN NOTE
Will start trazodone 50 mg nightly  Patient may increase to 100 nightly if needed after 1 week    Call further problems

## 2018-08-09 NOTE — ASSESSMENT & PLAN NOTE
Patient presents for recheck of low back pain  She is still having ongoing pain, but this is somewhat different than her usual pain (which is usually in the midline of her back )  Current pain is bilateral flank /bilateral lumbar region  Still with some urinary discomfort  She also is noticing more frequent bowel movements recently  Denies any blood in stool  She is needing to take more tramadol recently due to this pain ( up to 8 tablets daily)  Repeat urinalysis today showed trace protein, otherwise negative will sent for urine culture     Will call with results  If negative, consider sending for CT of abdomen and pelvis for stone study  Will continue tramadol ( may need to increase from 5 tabs daily to 7 )

## 2018-08-23 ENCOUNTER — TELEPHONE (OUTPATIENT)
Dept: FAMILY MEDICINE CLINIC | Facility: CLINIC | Age: 38
End: 2018-08-23

## 2018-08-23 NOTE — TELEPHONE ENCOUNTER
Pt call stating  You had prescribed trazodone last time she was here in which she's having some problems breathing through her nose and when she wakes up her mouth is really dry  She would like to know if this is normal or if the medication can be change?

## 2018-08-24 DIAGNOSIS — M54.9 CHRONIC BACK PAIN, UNSPECIFIED BACK LOCATION, UNSPECIFIED BACK PAIN LATERALITY: ICD-10-CM

## 2018-08-24 DIAGNOSIS — G47.00 INSOMNIA, UNSPECIFIED TYPE: Primary | ICD-10-CM

## 2018-08-24 DIAGNOSIS — G89.29 CHRONIC BACK PAIN, UNSPECIFIED BACK LOCATION, UNSPECIFIED BACK PAIN LATERALITY: ICD-10-CM

## 2018-08-24 RX ORDER — DOXEPIN HYDROCHLORIDE 10 MG/1
10 CAPSULE ORAL
Qty: 30 CAPSULE | Refills: 0 | Status: SHIPPED | OUTPATIENT
Start: 2018-08-24 | End: 2018-09-19 | Stop reason: ALTCHOICE

## 2018-08-24 RX ORDER — DOXEPIN HYDROCHLORIDE 10 MG/1
10 CAPSULE ORAL
Qty: 30 CAPSULE | Refills: 0 | Status: SHIPPED | OUTPATIENT
Start: 2018-08-24 | End: 2018-08-24 | Stop reason: SDUPTHER

## 2018-08-24 NOTE — TELEPHONE ENCOUNTER
Call patient yes trazodone can cause dry mouth  I can call a new medication in for her  With pharmacy?

## 2018-08-27 ENCOUNTER — TELEPHONE (OUTPATIENT)
Dept: FAMILY MEDICINE CLINIC | Facility: CLINIC | Age: 38
End: 2018-08-27

## 2018-08-27 ENCOUNTER — OFFICE VISIT (OUTPATIENT)
Dept: FAMILY MEDICINE CLINIC | Facility: CLINIC | Age: 38
End: 2018-08-27
Payer: COMMERCIAL

## 2018-08-27 VITALS
HEART RATE: 83 BPM | WEIGHT: 126.2 LBS | DIASTOLIC BLOOD PRESSURE: 70 MMHG | SYSTOLIC BLOOD PRESSURE: 108 MMHG | HEIGHT: 64 IN | OXYGEN SATURATION: 97 % | BODY MASS INDEX: 21.54 KG/M2 | RESPIRATION RATE: 18 BRPM | TEMPERATURE: 98.6 F

## 2018-08-27 DIAGNOSIS — J01.00 ACUTE NON-RECURRENT MAXILLARY SINUSITIS: Primary | ICD-10-CM

## 2018-08-27 DIAGNOSIS — J01.90 ACUTE NON-RECURRENT SINUSITIS, UNSPECIFIED LOCATION: Primary | ICD-10-CM

## 2018-08-27 PROCEDURE — 99213 OFFICE O/P EST LOW 20 MIN: CPT | Performed by: PHYSICIAN ASSISTANT

## 2018-08-27 RX ORDER — TRAMADOL HYDROCHLORIDE 50 MG/1
TABLET ORAL
Qty: 150 TABLET | Refills: 0 | Status: SHIPPED | OUTPATIENT
Start: 2018-08-27 | End: 2018-09-19 | Stop reason: SDUPTHER

## 2018-08-27 RX ORDER — SULFAMETHOXAZOLE AND TRIMETHOPRIM 800; 160 MG/1; MG/1
1 TABLET ORAL EVERY 12 HOURS SCHEDULED
Qty: 20 TABLET | Refills: 0 | Status: SHIPPED | OUTPATIENT
Start: 2018-08-27 | End: 2018-09-06

## 2018-08-27 RX ORDER — CEFUROXIME AXETIL 500 MG/1
500 TABLET ORAL EVERY 12 HOURS SCHEDULED
Qty: 20 TABLET | Refills: 0 | Status: SHIPPED | OUTPATIENT
Start: 2018-08-27 | End: 2018-09-06

## 2018-08-27 NOTE — PROGRESS NOTES
Assessment/Plan:         Diagnoses and all orders for this visit:    Acute non-recurrent sinusitis, unspecified location  -     cefuroxime (CEFTIN) 500 mg tablet; Take 1 tablet (500 mg total) by mouth every 12 (twelve) hours for 10 days      Discussed OTC cold meds  Fever Care, ER instructions given  F/U 5-7 days if not resolved  Chief Complaint   Patient presents with    Sore Throat     all symptoms for 5 days    Cough    Fatigue        Subjective:      Patient ID: Henrietta Espinoza is a 44 y o  female  Pt presents with 4 day history of subjective fever, congestion, PND, ST  Denies significant cough, N/V/D  She has taken Tylenol  Denies hx of asthma/allergies  Does not smoke  Sore Throat    Associated symptoms include congestion  Pertinent negatives include no coughing  Cough   Associated symptoms include a fever, postnasal drip and a sore throat  Fatigue   Associated symptoms include congestion, fatigue, a fever and a sore throat  Pertinent negatives include no coughing  The following portions of the patient's history were reviewed and updated as appropriate:   She  has a past medical history of Anxiety; Insomnia; Sacroiliitis ([address] ); and Vitamin D deficiency  She   Patient Active Problem List    Diagnosis Date Noted    Cystitis 2018    Cervical adenopathy 2018    Chronic low back pain 2018    Lumbar spondylosis 2018    Lumbar pars defect 2017    Insomnia 2017    Basal cell carcinoma of back 12/15/2016    Depression with anxiety 2014     She  has a past surgical history that includes  section  Her family history includes Cancer in her family; Diabetes in her family and father; Hypertension in her family and mother  She  reports that she has quit smoking  She quit smokeless tobacco use about 4 years ago  She reports that she does not drink alcohol or use drugs    Current Outpatient Prescriptions   Medication Sig Dispense Refill  doxepin (SINEquan) 10 mg capsule Take 1 capsule (10 mg total) by mouth daily at bedtime 30 capsule 0    traMADol (ULTRAM) 50 mg tablet 1 tab q 4h prn pain (max 5 tabs/day) 150 tablet 0    cefuroxime (CEFTIN) 500 mg tablet Take 1 tablet (500 mg total) by mouth every 12 (twelve) hours for 10 days 20 tablet 0     No current facility-administered medications for this visit  Current Outpatient Prescriptions on File Prior to Visit   Medication Sig    doxepin (SINEquan) 10 mg capsule Take 1 capsule (10 mg total) by mouth daily at bedtime    traMADol (ULTRAM) 50 mg tablet 1 tab q 4h prn pain (max 5 tabs/day)    [DISCONTINUED] phenazopyridine (PYRIDIUM) 200 mg tablet Take 1 tablet (200 mg total) by mouth 3 (three) times a day with meals     No current facility-administered medications on file prior to visit  She has No Known Allergies       Review of Systems   Constitutional: Positive for fatigue and fever  HENT: Positive for congestion, postnasal drip and sore throat  Respiratory: Negative  Negative for cough  Cardiovascular: Negative  Gastrointestinal: Negative  Genitourinary: Negative  Objective:      /70 (BP Location: Left arm, Patient Position: Sitting, Cuff Size: Large)   Pulse 83   Temp 98 6 °F (37 °C) (Tympanic)   Resp 18   Ht 5' 4 17" (1 63 m)   Wt 57 2 kg (126 lb 3 2 oz)   LMP 08/19/2018   SpO2 97%   Breastfeeding? No   BMI 21 55 kg/m²          Physical Exam   Constitutional: She is oriented to person, place, and time  She appears well-developed and well-nourished  No distress  HENT:   Right Ear: Hearing, external ear and ear canal normal    Left Ear: Hearing, external ear and ear canal normal    Nose: Mucosal edema (B/L boggy erythematous turbinates) present  Mouth/Throat: Mucous membranes are normal  Posterior oropharyngeal erythema (PND) present  No oropharyngeal exudate  B/L dull TMs   Neck: Neck supple     Cardiovascular: Normal rate, regular rhythm and normal heart sounds  Pulmonary/Chest: Effort normal and breath sounds normal    Lymphadenopathy:     She has no cervical adenopathy  Neurological: She is alert and oriented to person, place, and time  Psychiatric: She has a normal mood and affect  Vitals reviewed

## 2018-08-27 NOTE — TELEPHONE ENCOUNTER
Pt came in today and saw anastasiia  She was prescribed Ceftin and it makes her sick  She would like another medication [facility]

## 2018-09-19 ENCOUNTER — OFFICE VISIT (OUTPATIENT)
Dept: FAMILY MEDICINE CLINIC | Facility: CLINIC | Age: 38
End: 2018-09-19
Payer: COMMERCIAL

## 2018-09-19 VITALS
DIASTOLIC BLOOD PRESSURE: 78 MMHG | OXYGEN SATURATION: 98 % | TEMPERATURE: 98 F | RESPIRATION RATE: 16 BRPM | BODY MASS INDEX: 21.61 KG/M2 | HEART RATE: 75 BPM | SYSTOLIC BLOOD PRESSURE: 108 MMHG | HEIGHT: 64 IN | WEIGHT: 126.6 LBS

## 2018-09-19 DIAGNOSIS — G89.29 CHRONIC BACK PAIN, UNSPECIFIED BACK LOCATION, UNSPECIFIED BACK PAIN LATERALITY: ICD-10-CM

## 2018-09-19 DIAGNOSIS — G89.29 CHRONIC LOW BACK PAIN, UNSPECIFIED BACK PAIN LATERALITY, WITH SCIATICA PRESENCE UNSPECIFIED: Primary | ICD-10-CM

## 2018-09-19 DIAGNOSIS — M54.5 CHRONIC LOW BACK PAIN, UNSPECIFIED BACK PAIN LATERALITY, WITH SCIATICA PRESENCE UNSPECIFIED: Primary | ICD-10-CM

## 2018-09-19 DIAGNOSIS — M54.9 CHRONIC BACK PAIN, UNSPECIFIED BACK LOCATION, UNSPECIFIED BACK PAIN LATERALITY: ICD-10-CM

## 2018-09-19 PROCEDURE — 99213 OFFICE O/P EST LOW 20 MIN: CPT | Performed by: FAMILY MEDICINE

## 2018-09-19 RX ORDER — TRAMADOL HYDROCHLORIDE 50 MG/1
TABLET ORAL
Qty: 180 TABLET | Refills: 0 | Status: SHIPPED | OUTPATIENT
Start: 2018-09-19 | End: 2018-10-18 | Stop reason: SDUPTHER

## 2018-09-19 NOTE — ASSESSMENT & PLAN NOTE
Patient presents for recheck of chronic low back pain     She no longer has flank pain but is complaining about her usual midline low back pain  Sleep medications that were prescribed (trazodone and Sinequan) were ineffective and caused side effects     Patient is getting reasonable relief from tramadol, but is requesting increasing from 5-6 tablets daily to help her sleep at night  Will give Rx for tramadol Q 4 ( max 6 tablets daily, #180)        recheck 3 months

## 2018-09-19 NOTE — PROGRESS NOTES
Assessment/Plan:    Chronic low back pain  Patient presents for recheck of chronic low back pain     She no longer has flank pain but is complaining about her usual midline low back pain  Sleep medications that were prescribed (trazodone and Sinequan) were ineffective and caused side effects     Patient is getting reasonable relief from tramadol, but is requesting increasing from 5-6 tablets daily to help her sleep at night  Will give Rx for tramadol Q 4 ( max 6 tablets daily, #180)  recheck 3 months         Diagnoses and all orders for this visit:    Chronic low back pain, unspecified back pain laterality, with sciatica presence unspecified    Chronic back pain, unspecified back location, unspecified back pain laterality  -     traMADol (ULTRAM) 50 mg tablet; 1 tab q 4h prn pain (max 6 tabs/day)      3 MOS    Subjective:      Patient ID: Jhon Buchanan is a 44 y o  female  Patient presents for recheck of chronic low back pain     She no longer has flank pain but is complaining about her usual midline low back pain  Sleep medications that were prescribed (trazodone and Sinequan) were ineffective and caused side effects     Patient is getting reasonable relief from tramadol, but is requesting increasing from 5-6 tablets daily to help her sleep at night      Back Pain         The following portions of the patient's history were reviewed and updated as appropriate: allergies, current medications, past family history, past medical history, past social history, past surgical history and problem list     Review of Systems   Respiratory: Negative  Cardiovascular: Negative  Gastrointestinal: Negative  Genitourinary: Negative  Musculoskeletal: Positive for back pain           Objective:      /78 (BP Location: Left arm, Patient Position: Sitting, Cuff Size: Adult)   Pulse 75   Temp 98 °F (36 7 °C) (Tympanic)   Resp 16   Ht 5' 4" (1 626 m)   Wt 57 4 kg (126 lb 9 6 oz)   LMP 09/15/2018 (Exact Date)   SpO2 98%   BMI 21 73 kg/m²          Physical Exam   Musculoskeletal:    Point tenderness lumbar paravertebral region    Range of motion decreased due to pain

## 2018-09-22 DIAGNOSIS — G47.00 INSOMNIA, UNSPECIFIED TYPE: ICD-10-CM

## 2018-09-22 RX ORDER — DOXEPIN HYDROCHLORIDE 10 MG/1
CAPSULE ORAL
Qty: 30 CAPSULE | Refills: 0 | OUTPATIENT
Start: 2018-09-22

## 2018-10-18 DIAGNOSIS — G89.29 CHRONIC BACK PAIN, UNSPECIFIED BACK LOCATION, UNSPECIFIED BACK PAIN LATERALITY: ICD-10-CM

## 2018-10-18 DIAGNOSIS — M54.9 CHRONIC BACK PAIN, UNSPECIFIED BACK LOCATION, UNSPECIFIED BACK PAIN LATERALITY: ICD-10-CM

## 2018-10-18 RX ORDER — TRAMADOL HYDROCHLORIDE 50 MG/1
TABLET ORAL
Qty: 180 TABLET | Refills: 0 | Status: SHIPPED | OUTPATIENT
Start: 2018-10-18 | End: 2018-11-15 | Stop reason: SDUPTHER

## 2018-10-29 ENCOUNTER — OFFICE VISIT (OUTPATIENT)
Dept: FAMILY MEDICINE CLINIC | Facility: CLINIC | Age: 38
End: 2018-10-29
Payer: COMMERCIAL

## 2018-10-29 VITALS
WEIGHT: 127.4 LBS | SYSTOLIC BLOOD PRESSURE: 118 MMHG | HEIGHT: 64 IN | BODY MASS INDEX: 21.75 KG/M2 | HEART RATE: 64 BPM | OXYGEN SATURATION: 96 % | DIASTOLIC BLOOD PRESSURE: 74 MMHG | TEMPERATURE: 98.6 F | RESPIRATION RATE: 14 BRPM

## 2018-10-29 DIAGNOSIS — J02.9 ACUTE PHARYNGITIS, UNSPECIFIED ETIOLOGY: Primary | ICD-10-CM

## 2018-10-29 PROCEDURE — 99214 OFFICE O/P EST MOD 30 MIN: CPT | Performed by: FAMILY MEDICINE

## 2018-10-29 PROCEDURE — 1036F TOBACCO NON-USER: CPT | Performed by: FAMILY MEDICINE

## 2018-10-29 PROCEDURE — 3008F BODY MASS INDEX DOCD: CPT | Performed by: FAMILY MEDICINE

## 2018-10-29 RX ORDER — LEVOFLOXACIN 500 MG/1
500 TABLET, FILM COATED ORAL EVERY 24 HOURS
Qty: 7 TABLET | Refills: 0 | Status: SHIPPED | OUTPATIENT
Start: 2018-10-29 | End: 2018-11-05

## 2018-10-29 NOTE — PROGRESS NOTES
Assessment/Plan:   1  Acute pharyngitis, unspecified etiology  Start supportive care  Maintain hydration  Take over-the-counter Mucinex relief  Med gargle with salt water for symptom relief  Will start treatment with Levaquin 500 mg daily for 7 days  If any symptoms are persisting, will consider further evaluation with Otolaryngology  - levofloxacin (LEVAQUIN) 500 mg tablet; Take 1 tablet (500 mg total) by mouth every 24 hours for 7 days  Dispense: 7 tablet; Refill: 0     There are no diagnoses linked to this encounter  Subjective:    Chief Complaint   Patient presents with    Follow-up     has had sore thorat for x6 months,  bodyaches x 4 days, feeling tired  Patient ID: Tere Schreiber is a 44 y o  female  Sore Throat    This is a recurrent problem  The current episode started in the past 7 days  The problem has been unchanged  The pain is worse on the left side  There has been no fever  The patient is experiencing no pain  Associated symptoms include congestion, coughing, ear discharge and ear pain  Pertinent negatives include no abdominal pain, diarrhea, headaches or shortness of breath  She has tried cool liquids for the symptoms  The treatment provided no relief  Review of Systems   Constitutional: Negative for activity change, chills, fatigue and fever  HENT: Positive for congestion, ear discharge, ear pain and sore throat  Negative for sinus pressure  Eyes: Negative for redness, itching and visual disturbance  Respiratory: Positive for cough  Negative for shortness of breath  Cardiovascular: Negative for chest pain and palpitations  Gastrointestinal: Negative for abdominal pain, diarrhea and nausea  Endocrine: Negative for cold intolerance and heat intolerance  Genitourinary: Negative for dysuria, flank pain and frequency  Musculoskeletal: Negative for arthralgias, back pain, gait problem and myalgias  Skin: Negative for color change     Allergic/Immunologic: Negative for environmental allergies  Neurological: Negative for dizziness, numbness and headaches  Psychiatric/Behavioral: Negative for behavioral problems and sleep disturbance  The following portions of the patient's history were reviewed and updated as appropriate : past family history, past medical history, past social history and past surgical history  Current Outpatient Prescriptions:     traMADol (ULTRAM) 50 mg tablet, 1 tab q 4h prn pain (max 6 tabs/day), Disp: 180 tablet, Rfl: 0    Objective:    Vitals:    10/29/18 1101   BP: 118/74   BP Location: Left arm   Patient Position: Sitting   Cuff Size: Adult   Pulse: 64   Resp: 14   Temp: 98 6 °F (37 °C)   TempSrc: Tympanic   SpO2: 96%   Weight: 57 8 kg (127 lb 6 4 oz)   Height: 5' 4" (1 626 m)        Physical Exam   Constitutional: She is oriented to person, place, and time  She appears well-developed and well-nourished  HENT:   Head: Normocephalic and atraumatic  Nose: Nose normal    Mouth/Throat: No oropharyngeal exudate  Eyes: Pupils are equal, round, and reactive to light  Right eye exhibits no discharge  Left eye exhibits no discharge  Neck: Normal range of motion  Neck supple  No tracheal deviation present  Cardiovascular: Normal rate, regular rhythm and intact distal pulses  Exam reveals no gallop and no friction rub  No murmur heard  Pulses:       Dorsalis pedis pulses are 2+ on the right side, and 2+ on the left side  Posterior tibial pulses are 2+ on the right side, and 2+ on the left side  Pulmonary/Chest: Effort normal and breath sounds normal  No respiratory distress  She has no wheezes  She has no rales  Abdominal: Soft  Bowel sounds are normal  She exhibits no distension  There is no tenderness  There is no rebound and no guarding  Musculoskeletal: Normal range of motion  She exhibits no edema  Lymphadenopathy:        Head (right side): No submental and no submandibular adenopathy present  Head (left side): No submental and no submandibular adenopathy present  She has no cervical adenopathy  Right cervical: No superficial cervical, no deep cervical and no posterior cervical adenopathy present  Left cervical: No superficial cervical, no deep cervical and no posterior cervical adenopathy present  Neurological: She is alert and oriented to person, place, and time  No cranial nerve deficit or sensory deficit  Skin: Skin is warm, dry and intact  Psychiatric: Her speech is normal and behavior is normal  Judgment normal  Her mood appears not anxious  Cognition and memory are normal  She does not exhibit a depressed mood  Vitals reviewed

## 2018-11-15 DIAGNOSIS — G89.29 CHRONIC BACK PAIN, UNSPECIFIED BACK LOCATION, UNSPECIFIED BACK PAIN LATERALITY: ICD-10-CM

## 2018-11-15 DIAGNOSIS — M54.9 CHRONIC BACK PAIN, UNSPECIFIED BACK LOCATION, UNSPECIFIED BACK PAIN LATERALITY: ICD-10-CM

## 2018-11-15 RX ORDER — TRAMADOL HYDROCHLORIDE 50 MG/1
TABLET ORAL
Qty: 180 TABLET | Refills: 0 | Status: SHIPPED | OUTPATIENT
Start: 2018-11-15 | End: 2018-12-11 | Stop reason: SDUPTHER

## 2018-12-11 DIAGNOSIS — M54.9 CHRONIC BACK PAIN, UNSPECIFIED BACK LOCATION, UNSPECIFIED BACK PAIN LATERALITY: ICD-10-CM

## 2018-12-11 DIAGNOSIS — G89.29 CHRONIC BACK PAIN, UNSPECIFIED BACK LOCATION, UNSPECIFIED BACK PAIN LATERALITY: ICD-10-CM

## 2018-12-11 RX ORDER — TRAMADOL HYDROCHLORIDE 50 MG/1
TABLET ORAL
Qty: 180 TABLET | Refills: 0 | Status: SHIPPED | OUTPATIENT
Start: 2018-12-11 | End: 2019-01-08 | Stop reason: SDUPTHER

## 2019-01-08 DIAGNOSIS — G89.29 CHRONIC BACK PAIN, UNSPECIFIED BACK LOCATION, UNSPECIFIED BACK PAIN LATERALITY: ICD-10-CM

## 2019-01-08 DIAGNOSIS — M54.9 CHRONIC BACK PAIN, UNSPECIFIED BACK LOCATION, UNSPECIFIED BACK PAIN LATERALITY: ICD-10-CM

## 2019-01-08 RX ORDER — TRAMADOL HYDROCHLORIDE 50 MG/1
TABLET ORAL
Qty: 180 TABLET | Refills: 0 | Status: SHIPPED | OUTPATIENT
Start: 2019-01-08 | End: 2019-02-05 | Stop reason: SDUPTHER

## 2019-02-05 DIAGNOSIS — M54.9 CHRONIC BACK PAIN, UNSPECIFIED BACK LOCATION, UNSPECIFIED BACK PAIN LATERALITY: ICD-10-CM

## 2019-02-05 DIAGNOSIS — G89.29 CHRONIC BACK PAIN, UNSPECIFIED BACK LOCATION, UNSPECIFIED BACK PAIN LATERALITY: ICD-10-CM

## 2019-02-05 RX ORDER — TRAMADOL HYDROCHLORIDE 50 MG/1
TABLET ORAL
Qty: 180 TABLET | Refills: 0 | Status: SHIPPED | OUTPATIENT
Start: 2019-02-05 | End: 2019-03-05 | Stop reason: SDUPTHER

## 2019-02-11 ENCOUNTER — OFFICE VISIT (OUTPATIENT)
Dept: FAMILY MEDICINE CLINIC | Facility: CLINIC | Age: 39
End: 2019-02-11
Payer: COMMERCIAL

## 2019-02-11 VITALS
HEIGHT: 64 IN | RESPIRATION RATE: 16 BRPM | WEIGHT: 131.4 LBS | TEMPERATURE: 97.3 F | DIASTOLIC BLOOD PRESSURE: 68 MMHG | HEART RATE: 66 BPM | OXYGEN SATURATION: 99 % | SYSTOLIC BLOOD PRESSURE: 102 MMHG | BODY MASS INDEX: 22.43 KG/M2

## 2019-02-11 DIAGNOSIS — M54.5 CHRONIC LOW BACK PAIN, UNSPECIFIED BACK PAIN LATERALITY, WITH SCIATICA PRESENCE UNSPECIFIED: ICD-10-CM

## 2019-02-11 DIAGNOSIS — G89.29 CHRONIC LOW BACK PAIN, UNSPECIFIED BACK PAIN LATERALITY, WITH SCIATICA PRESENCE UNSPECIFIED: ICD-10-CM

## 2019-02-11 DIAGNOSIS — R59.0 CERVICAL ADENOPATHY: Primary | ICD-10-CM

## 2019-02-11 PROCEDURE — 3008F BODY MASS INDEX DOCD: CPT | Performed by: FAMILY MEDICINE

## 2019-02-11 PROCEDURE — 99214 OFFICE O/P EST MOD 30 MIN: CPT | Performed by: FAMILY MEDICINE

## 2019-02-11 NOTE — ASSESSMENT & PLAN NOTE
Patient presents to have right-sided lymph nodes checked  These have been present for over 10 years, but recently she thinks they may be getting bigger  Denies any fevers, chills, night sweats, weight loss  Ultrasound performed June 2018 was negative  Will refer to Surgical Oncology for possible biopsy

## 2019-02-11 NOTE — PROGRESS NOTES
[facility]      NAME: Carlos Huerta  AGE: 43 y o  SEX: female  : 1980   MRN: [id number]    DATE: 2019  TIME: 1:10 PM    Assessment and Plan     Problem List Items Addressed This Visit     Chronic low back pain     Patient also presents to discuss ongoing low back pain     She had rhizotomy performed with some improvement, but symptoms have now worsened again  Her pain is mostly stable on tramadol, 6 tablets daily  Without side effects  Will refer to Neurosurgery for 2nd opinion  Relevant Orders    Ambulatory referral to Neurosurgery    Cervical adenopathy - Primary     Patient presents to have right-sided lymph nodes checked  These have been present for over 10 years, but recently she thinks they may be getting bigger  Denies any fevers, chills, night sweats, weight loss  Ultrasound performed 2018 was negative  Will refer to Surgical Oncology for possible biopsy  Relevant Orders    Ambulatory referral to Surgical Oncology              Return to office in:  6 months    Chief Complaint     Chief Complaint   Patient presents with    Follow-up     Lump on Neck       History of Present Illness     Patient presents to have right-sided lymph nodes checked  These have been present for over 10 years, but recently she thinks they may be getting bigger  Denies any fevers, chills, night sweats, weight loss  Ultrasound performed 2018 was negative  Patient also presents to discuss ongoing low back pain     She had rhizotomy performed with some improvement, but symptoms have now worsened again  Her pain is mostly stable on tramadol, 6 tablets daily  Without side effects        The following portions of the patient's history were reviewed and updated as appropriate: allergies, current medications, past family history, past medical history, past social history, past surgical history and problem list     Review of Systems   Review of Systems Constitutional: Negative for chills, fatigue, fever and unexpected weight change  Respiratory: Negative  Cardiovascular: Negative  Gastrointestinal: Negative  Genitourinary: Negative  Musculoskeletal: Positive for back pain  Active Problem List     Patient Active Problem List   Diagnosis    Chronic low back pain    Lumbar spondylosis    Depression with anxiety    Basal cell carcinoma of back    Insomnia    Lumbar pars defect    Cervical adenopathy    Cystitis       Objective   /68 (BP Location: Left arm, Patient Position: Sitting, Cuff Size: Standard)   Pulse 66   Temp (!) 97 3 °F (36 3 °C) (Tympanic)   Resp 16   Ht 5' 4" (1 626 m)   Wt 59 6 kg (131 lb 6 4 oz)   SpO2 99%   BMI 22 55 kg/m²     Physical Exam   Cardiovascular: Normal rate, regular rhythm, normal heart sounds and intact distal pulses  Carotids: no bruits  Ext: no edema   Pulmonary/Chest: Effort normal  No respiratory distress  She has no wheezes  She has no rales  Psychiatric: She has a normal mood and affect   Her behavior is normal  Thought content normal        Pertinent Laboratory/Diagnostic Studies:  Consult/MRI results    Current Medications     Current Outpatient Medications:     traMADol (ULTRAM) 50 mg tablet, 1 tab q 4h prn pain (max 6 tabs/day), Disp: 180 tablet, Rfl: 0    Health Maintenance     Health Maintenance   Topic Date Due    Pneumococcal PPSV23 Highest Risk Adult (1 of 3 - PCV13) 12/20/1998    HEPATITIS B VACCINES (3 of 3 - 3-dose primary series) 07/22/1999    INFLUENZA VACCINE  02/11/2020 (Originally 7/1/2018)    Depression Screening PHQ  09/19/2019    BMI: Adult  10/29/2019    DTaP,Tdap,and Td Vaccines (2 - Td) 07/01/2025     Immunization History   Administered Date(s) Administered    Hep B, adult 12/17/1998, 05/27/1999    MMR 12/17/1998    Tdap 07/01/2015       DO Asim [facility]

## 2019-02-11 NOTE — ASSESSMENT & PLAN NOTE
Patient also presents to discuss ongoing low back pain     She had rhizotomy performed with some improvement, but symptoms have now worsened again  Her pain is mostly stable on tramadol, 6 tablets daily  Without side effects  Will refer to Neurosurgery for 2nd opinion

## 2019-03-01 ENCOUNTER — CONSULT (OUTPATIENT)
Dept: SURGICAL ONCOLOGY | Facility: CLINIC | Age: 39
End: 2019-03-01
Payer: COMMERCIAL

## 2019-03-01 VITALS
HEART RATE: 80 BPM | TEMPERATURE: 98.6 F | RESPIRATION RATE: 16 BRPM | BODY MASS INDEX: 22.86 KG/M2 | HEIGHT: 63 IN | WEIGHT: 129 LBS | DIASTOLIC BLOOD PRESSURE: 70 MMHG | SYSTOLIC BLOOD PRESSURE: 100 MMHG

## 2019-03-01 DIAGNOSIS — R59.0 CERVICAL ADENOPATHY: Primary | ICD-10-CM

## 2019-03-01 PROCEDURE — 99243 OFF/OP CNSLTJ NEW/EST LOW 30: CPT | Performed by: SURGERY

## 2019-03-01 NOTE — LETTER
March 1, 2019     Gloria Cosme DO  [facility]  [address]    Patient: Rolanda Jarrett   YOB: 1980   Date of Visit: 3/1/2019       Dear Dr Pascale Ferrell:    Thank you for referring Rolanda Jarrett to me for evaluation  Below are my notes for this consultation  If you have questions, please do not hesitate to call me  I look forward to following your patient along with you  Sincerely,        Rafael Fleischer, MD        CC: Edda Hait, DO Rafael Fleischer, MD  3/1/2019  8:41 AM  Sign at close encounter               Surgical Oncology Follow Up       [address] [address]    Rolanda Jarrett  1980  [id number]  [facility] [address] [address]    Chief Complaint   Patient presents with    Consult     New patient referral for cervical adenopathy  Assessment/Plan:    No problem-specific Assessment & Plan notes found for this encounter  Diagnoses and all orders for this visit:    Cervical adenopathy  -     US head neck lymph node mapping; Future        Advance Care Planning/Advance Directives:  Did discuss disease status, cancer treatment plans and/or cancer treatment goals with the patient  No history exists  History of Present Illness:  Patient is a 28-year-old woman who over the last 10-12 years has noticed a lymph node in the right upper neck behind the angle of the mandible  She states that this occasionally becomes tender  She has had no fevers, chills, night sweats, or unintended weight loss  Symptoms typically resolved after some time  Review of Systems   Constitutional: Negative  HENT: Negative  Eyes: Negative  Respiratory: Negative  Cardiovascular: Negative  Gastrointestinal: Negative  Endocrine: Negative  Genitourinary: Negative      Musculoskeletal: Negative  Skin: Negative  Allergic/Immunologic: Negative  Neurological: Negative  Hematological: Positive for adenopathy  Psychiatric/Behavioral: Negative            Patient Active Problem List   Diagnosis    Chronic low back pain    Lumbar spondylosis    Depression with anxiety    Basal cell carcinoma of back    Insomnia    Lumbar pars defect    Cervical adenopathy    Cystitis     Past Medical History:   Diagnosis Date    Anxiety     Last assessed: 10/11/13    Insomnia     Last assessed: 13    Sacroiliitis ([address] )     Last assessed: 10/11/13    Vitamin D deficiency     Last assessed: 10/11/13     Past Surgical History:   Procedure Laterality Date     SECTION      x2 Last assessed: 14    SKIN LESION EXCISION      Basal cell skin cancer excision at age 12     Family History   Problem Relation Age of Onset    Diabetes Family         Mellitus    Hypertension Family     Cancer Family     Hypertension Mother     Diabetes Father     ALS Father     Lung cancer Maternal Grandfather     Ovarian cancer Paternal Grandmother      Social History     Socioeconomic History    Marital status: /Civil Union     Spouse name: Not on file    Number of children: Not on file    Years of education: Not on file    Highest education level: Not on file   Occupational History    Occupation: Employed   Social Needs    Financial resource strain: Not on file    Food insecurity:     Worry: Not on file     Inability: Not on file   [employer] needs:     Medical: Not on file     Non-medical: Not on file   Tobacco Use    Smoking status: Former Smoker     Packs/day: 0 25     Years: 15 00     Pack years: 3 75     Last attempt to quit: 2015     Years since quittin 1    Smokeless tobacco: Former User     Quit date: 2014    Tobacco comment: QUIT 4 YEARS AGO   Substance and Sexual Activity    Alcohol use: No    Drug use: No    Sexual activity: Not on file   Lifestyle    Physical activity:     Days per week: Not on file     Minutes per session: Not on file    Stress: Not on file   Relationships    Social connections:     Talks on phone: Not on file     Gets together: Not on file     Attends Mandaeism service: Not on file     Active member of club or organization: Not on file     Attends meetings of clubs or organizations: Not on file     Relationship status: Not on file    Intimate partner violence:     Fear of current or ex partner: Not on file     Emotionally abused: Not on file     Physically abused: Not on file     Forced sexual activity: Not on file   Other Topics Concern    Not on file   Social History Narrative    Lives w/spouse    Lives w/children       Current Outpatient Medications:     traMADol (ULTRAM) 50 mg tablet, 1 tab q 4h prn pain (max 6 tabs/day), Disp: 180 tablet, Rfl: 0  No Known Allergies  Vitals:    03/01/19 0816   BP: 100/70   Pulse: 80   Resp: 16   Temp: 98 6 °F (37 °C)       Physical Exam   Constitutional: She is oriented to person, place, and time  HENT:   Head: Normocephalic and atraumatic  Right Ear: External ear normal    Left Ear: External ear normal    Eyes: Pupils are equal, round, and reactive to light  Conjunctivae and EOM are normal    Neck: Normal range of motion  Neck supple  No JVD present  No tracheal deviation present  No thyromegaly present  Palpable right upper jugular lymph node behind angle of mandible, and just deep to sternocleidomastoid muscle  Cardiovascular: Normal rate, regular rhythm and normal heart sounds  Pulmonary/Chest: Effort normal and breath sounds normal    Abdominal: Soft  Bowel sounds are normal    Musculoskeletal: Normal range of motion  Neurological: She is alert and oriented to person, place, and time  Skin: Skin is warm and dry  Psychiatric: She has a normal mood and affect   Her behavior is normal  Judgment and thought content normal        Pathology:  none    Labs:  CBC, Coags, BMP, Mg, Phos Imaging  Right neck ULTRASOUND     INDICATION:   R59 0: Localized enlarged lymph nodes  Palpable abnormality below the right ear  Nodule is been present for 12 years and is not painful      COMPARISON:  None     TECHNIQUE:   Real-time ultrasound of the right periventricular region was performed with a linear transducer with both volumetric sweeps and still imaging techniques      FINDINGS:  A few sonographically normal lymph nodes noted in the area of palpable concern/pain  The right parotid gland is normal, free of mass lesion  No suspicious collection or mass  IMPRESSION:     Normal         Workstation performed: [id number]  I reviewed the above laboratory and imaging data  Discussion/Summary:  History of prominent right cervical lymph node  Not SI suspicious for cancer  Will order ultrasound to evaluate  Further management including continued observation versus biopsy to be determined based on ultrasound results

## 2019-03-01 NOTE — PROGRESS NOTES
Surgical Oncology Follow Up       [facility] [place]  [address] [facility]  [facility] [address]    Radha Licona  1980  [id number]  [facility] [place]  [address] [facility] [address]    Chief Complaint   Patient presents with    Consult     New patient referral for cervical adenopathy  Assessment/Plan:    No problem-specific Assessment & Plan notes found for this encounter  Diagnoses and all orders for this visit:    Cervical adenopathy  -     US head neck lymph node mapping; Future        Advance Care Planning/Advance Directives:  Did discuss disease status, cancer treatment plans and/or cancer treatment goals with the patient  No history exists  History of Present Illness:  Patient is a 19-year-old woman who over the last 10-12 years has noticed a lymph node in the right upper neck behind the angle of the mandible  She states that this occasionally becomes tender  She has had no fevers, chills, night sweats, or unintended weight loss  Symptoms typically resolved after some time  Review of Systems   Constitutional: Negative  HENT: Negative  Eyes: Negative  Respiratory: Negative  Cardiovascular: Negative  Gastrointestinal: Negative  Endocrine: Negative  Genitourinary: Negative  Musculoskeletal: Negative  Skin: Negative  Allergic/Immunologic: Negative  Neurological: Negative  Hematological: Positive for adenopathy  Psychiatric/Behavioral: Negative            Patient Active Problem List   Diagnosis    Chronic low back pain    Lumbar spondylosis    Depression with anxiety    Basal cell carcinoma of back    Insomnia    Lumbar pars defect    Cervical adenopathy    Cystitis     Past Medical History:   Diagnosis Date    Anxiety     Last assessed: 10/11/13    Insomnia     Last assessed: 8/26/13    [address]roiliitis ([facility] [address] )     Last assessed: 10/11/13    Vitamin D deficiency     Last assessed: 10/11/13     Past Surgical History:   Procedure Laterality Date     SECTION      x2 Last assessed: 14    SKIN LESION EXCISION      Basal cell skin cancer excision at age 12     Family History   Problem Relation Age of Onset    Diabetes Family         Mellitus    Hypertension Family     Cancer Family     Hypertension Mother     Diabetes Father     ALS Father     Lung cancer Maternal Grandfather     Ovarian cancer Paternal Grandmother      Social History     Socioeconomic History    Marital status: /Civil Union     Spouse name: Not on file    Number of children: Not on file    Years of education: Not on file    Highest education level: Not on file   Occupational History    Occupation: Employed   Social Needs    Financial resource strain: Not on file    Food insecurity:     Worry: Not on file     Inability: Not on file   [employer] needs:     Medical: Not on file     Non-medical: Not on file   Tobacco Use    Smoking status: Former Smoker     Packs/day: 0 25     Years: 15 00     Pack years: 3 75     Last attempt to quit: 2015     Years since quittin 1    Smokeless tobacco: Former User     Quit date: 2014    Tobacco comment: QUIT 4 YEARS AGO   Substance and Sexual Activity    Alcohol use: No    Drug use: No    Sexual activity: Not on file   Lifestyle    Physical activity:     Days per week: Not on file     Minutes per session: Not on file    Stress: Not on file   Relationships    Social connections:     Talks on phone: Not on file     Gets together: Not on file     Attends [employer] service: Not on file     Active member of club or organization: Not on file     Attends meetings of clubs or organizations: Not on file     Relationship status: Not on file    Intimate partner violence:     Fear of current or ex partner: Not on file     Emotionally abused: Not on file     Physically abused: Not on file Forced sexual activity: Not on file   Other Topics Concern    Not on file   Social History Narrative    Lives w/spouse    Lives w/children       Current Outpatient Medications:     traMADol (ULTRAM) 50 mg tablet, 1 tab q 4h prn pain (max 6 tabs/day), Disp: 180 tablet, Rfl: 0  No Known Allergies  Vitals:    03/01/19 0816   BP: 100/70   Pulse: 80   Resp: 16   Temp: 98 6 °F (37 °C)       Physical Exam   Constitutional: She is oriented to person, place, and time  HENT:   Head: Normocephalic and atraumatic  Right Ear: External ear normal    Left Ear: External ear normal    Eyes: Pupils are equal, round, and reactive to light  Conjunctivae and EOM are normal    Neck: Normal range of motion  Neck supple  No JVD present  No tracheal deviation present  No thyromegaly present  Palpable right upper jugular lymph node behind angle of mandible, and just deep to sternocleidomastoid muscle  Cardiovascular: Normal rate, regular rhythm and normal heart sounds  Pulmonary/Chest: Effort normal and breath sounds normal    Abdominal: Soft  Bowel sounds are normal    Musculoskeletal: Normal range of motion  Neurological: She is alert and oriented to person, place, and time  Skin: Skin is warm and dry  Psychiatric: She has a normal mood and affect  Her behavior is normal  Judgment and thought content normal        Pathology:  none    Labs:  CBC, Coags, BMP, Mg, Phos     Imaging  Right neck ULTRASOUND     INDICATION:   R59 0: Localized enlarged lymph nodes  Palpable abnormality below the right ear  Nodule is been present for 12 years and is not painful      COMPARISON:  None     TECHNIQUE:   Real-time ultrasound of the right periventricular region was performed with a linear transducer with both volumetric sweeps and still imaging techniques      FINDINGS:  A few sonographically normal lymph nodes noted in the area of palpable concern/pain  The right parotid gland is normal, free of mass lesion    No suspicious collection or mass  IMPRESSION:     Normal         Workstation performed: [id number]  I reviewed the above laboratory and imaging data  Discussion/Summary:  History of prominent right cervical lymph node  Not SI suspicious for cancer  Will order ultrasound to evaluate  Further management including continued observation versus biopsy to be determined based on ultrasound results

## 2019-03-05 DIAGNOSIS — M54.9 CHRONIC BACK PAIN, UNSPECIFIED BACK LOCATION, UNSPECIFIED BACK PAIN LATERALITY: ICD-10-CM

## 2019-03-05 DIAGNOSIS — G89.29 CHRONIC BACK PAIN, UNSPECIFIED BACK LOCATION, UNSPECIFIED BACK PAIN LATERALITY: ICD-10-CM

## 2019-03-05 RX ORDER — TRAMADOL HYDROCHLORIDE 50 MG/1
TABLET ORAL
Qty: 180 TABLET | Refills: 0 | Status: SHIPPED | OUTPATIENT
Start: 2019-03-05 | End: 2019-04-02 | Stop reason: SDUPTHER

## 2019-03-06 ENCOUNTER — HOSPITAL ENCOUNTER (OUTPATIENT)
Dept: ULTRASOUND IMAGING | Facility: HOSPITAL | Age: 39
Discharge: HOME | End: 2019-03-06
Attending: SURGERY
Payer: COMMERCIAL

## 2019-03-06 DIAGNOSIS — R59.0 CERVICAL ADENOPATHY: ICD-10-CM

## 2019-03-06 PROCEDURE — 76536 US EXAM OF HEAD AND NECK: CPT

## 2019-03-15 ENCOUNTER — TELEPHONE (OUTPATIENT)
Dept: SURGICAL ONCOLOGY | Facility: CLINIC | Age: 39
End: 2019-03-15

## 2019-03-15 NOTE — TELEPHONE ENCOUNTER
Left message for patient to call the office  Patient had recent ultrasound which was normal  Per Dr Lawrence Guidry, patient can follow up with the office as needed

## 2019-04-02 DIAGNOSIS — G89.29 CHRONIC BACK PAIN, UNSPECIFIED BACK LOCATION, UNSPECIFIED BACK PAIN LATERALITY: ICD-10-CM

## 2019-04-02 DIAGNOSIS — M54.9 CHRONIC BACK PAIN, UNSPECIFIED BACK LOCATION, UNSPECIFIED BACK PAIN LATERALITY: ICD-10-CM

## 2019-04-02 RX ORDER — TRAMADOL HYDROCHLORIDE 50 MG/1
TABLET ORAL
Qty: 180 TABLET | Refills: 0 | Status: SHIPPED | OUTPATIENT
Start: 2019-04-02 | End: 2019-04-29 | Stop reason: SDUPTHER

## 2019-04-26 ENCOUNTER — TELEPHONE (OUTPATIENT)
Dept: FAMILY MEDICINE CLINIC | Facility: CLINIC | Age: 39
End: 2019-04-26

## 2019-04-28 DIAGNOSIS — G89.29 CHRONIC BACK PAIN, UNSPECIFIED BACK LOCATION, UNSPECIFIED BACK PAIN LATERALITY: ICD-10-CM

## 2019-04-28 DIAGNOSIS — M54.9 CHRONIC BACK PAIN, UNSPECIFIED BACK LOCATION, UNSPECIFIED BACK PAIN LATERALITY: ICD-10-CM

## 2019-04-28 RX ORDER — TRAMADOL HYDROCHLORIDE 50 MG/1
TABLET ORAL
Qty: 180 TABLET | Refills: 0 | Status: CANCELLED | OUTPATIENT
Start: 2019-04-28

## 2019-04-29 DIAGNOSIS — G89.29 CHRONIC BACK PAIN, UNSPECIFIED BACK LOCATION, UNSPECIFIED BACK PAIN LATERALITY: ICD-10-CM

## 2019-04-29 DIAGNOSIS — M54.9 CHRONIC BACK PAIN, UNSPECIFIED BACK LOCATION, UNSPECIFIED BACK PAIN LATERALITY: ICD-10-CM

## 2019-04-29 RX ORDER — TRAMADOL HYDROCHLORIDE 50 MG/1
TABLET ORAL
Qty: 180 TABLET | Refills: 0 | Status: SHIPPED | OUTPATIENT
Start: 2019-04-29 | End: 2019-05-28 | Stop reason: SDUPTHER

## 2019-05-28 DIAGNOSIS — G89.29 CHRONIC BACK PAIN, UNSPECIFIED BACK LOCATION, UNSPECIFIED BACK PAIN LATERALITY: ICD-10-CM

## 2019-05-28 DIAGNOSIS — M54.9 CHRONIC BACK PAIN, UNSPECIFIED BACK LOCATION, UNSPECIFIED BACK PAIN LATERALITY: ICD-10-CM

## 2019-05-28 RX ORDER — TRAMADOL HYDROCHLORIDE 50 MG/1
TABLET ORAL
Qty: 180 TABLET | Refills: 0 | Status: SHIPPED | OUTPATIENT
Start: 2019-05-28 | End: 2019-06-25 | Stop reason: SDUPTHER

## 2019-06-06 ENCOUNTER — OFFICE VISIT (OUTPATIENT)
Dept: FAMILY MEDICINE CLINIC | Facility: CLINIC | Age: 39
End: 2019-06-06
Payer: COMMERCIAL

## 2019-06-06 VITALS
BODY MASS INDEX: 23.04 KG/M2 | RESPIRATION RATE: 16 BRPM | WEIGHT: 130 LBS | SYSTOLIC BLOOD PRESSURE: 100 MMHG | DIASTOLIC BLOOD PRESSURE: 56 MMHG | HEIGHT: 63 IN | HEART RATE: 70 BPM | TEMPERATURE: 98.2 F | OXYGEN SATURATION: 98 %

## 2019-06-06 DIAGNOSIS — L01.00 IMPETIGO: Primary | ICD-10-CM

## 2019-06-06 DIAGNOSIS — F41.8 DEPRESSION WITH ANXIETY: ICD-10-CM

## 2019-06-06 PROCEDURE — 3008F BODY MASS INDEX DOCD: CPT | Performed by: FAMILY MEDICINE

## 2019-06-06 PROCEDURE — 99214 OFFICE O/P EST MOD 30 MIN: CPT | Performed by: FAMILY MEDICINE

## 2019-06-06 PROCEDURE — 1036F TOBACCO NON-USER: CPT | Performed by: FAMILY MEDICINE

## 2019-06-23 DIAGNOSIS — G89.29 CHRONIC BACK PAIN, UNSPECIFIED BACK LOCATION, UNSPECIFIED BACK PAIN LATERALITY: ICD-10-CM

## 2019-06-23 DIAGNOSIS — M54.9 CHRONIC BACK PAIN, UNSPECIFIED BACK LOCATION, UNSPECIFIED BACK PAIN LATERALITY: ICD-10-CM

## 2019-06-23 RX ORDER — TRAMADOL HYDROCHLORIDE 50 MG/1
TABLET ORAL
Qty: 180 TABLET | Refills: 0 | Status: CANCELLED | OUTPATIENT
Start: 2019-06-23

## 2019-06-25 DIAGNOSIS — M54.9 CHRONIC BACK PAIN, UNSPECIFIED BACK LOCATION, UNSPECIFIED BACK PAIN LATERALITY: ICD-10-CM

## 2019-06-25 DIAGNOSIS — G89.29 CHRONIC BACK PAIN, UNSPECIFIED BACK LOCATION, UNSPECIFIED BACK PAIN LATERALITY: ICD-10-CM

## 2019-06-25 RX ORDER — TRAMADOL HYDROCHLORIDE 50 MG/1
TABLET ORAL
Qty: 180 TABLET | Refills: 0 | Status: SHIPPED | OUTPATIENT
Start: 2019-06-25 | End: 2019-07-20 | Stop reason: SDUPTHER

## 2019-06-25 RX ORDER — TRAMADOL HYDROCHLORIDE 50 MG/1
TABLET ORAL
Qty: 180 TABLET | Refills: 0 | Status: SHIPPED | OUTPATIENT
Start: 2019-06-25 | End: 2019-08-17 | Stop reason: SDUPTHER

## 2019-07-17 ENCOUNTER — CONSULT (OUTPATIENT)
Dept: NEUROSURGERY | Facility: CLINIC | Age: 39
End: 2019-07-17
Payer: COMMERCIAL

## 2019-07-17 VITALS
HEIGHT: 63 IN | WEIGHT: 138 LBS | TEMPERATURE: 98 F | BODY MASS INDEX: 24.45 KG/M2 | SYSTOLIC BLOOD PRESSURE: 100 MMHG | HEART RATE: 79 BPM | DIASTOLIC BLOOD PRESSURE: 70 MMHG

## 2019-07-17 DIAGNOSIS — M43.06 LUMBAR PARS DEFECT: ICD-10-CM

## 2019-07-17 DIAGNOSIS — G89.29 CHRONIC LOW BACK PAIN, UNSPECIFIED BACK PAIN LATERALITY, WITH SCIATICA PRESENCE UNSPECIFIED: Primary | ICD-10-CM

## 2019-07-17 DIAGNOSIS — M54.5 CHRONIC LOW BACK PAIN, UNSPECIFIED BACK PAIN LATERALITY, WITH SCIATICA PRESENCE UNSPECIFIED: Primary | ICD-10-CM

## 2019-07-17 PROCEDURE — 99243 OFF/OP CNSLTJ NEW/EST LOW 30: CPT | Performed by: NEUROLOGICAL SURGERY

## 2019-07-17 NOTE — PROGRESS NOTES
Office Note - Neurosurgery   Lai Dean 43 y o  female MRN: [id number]      Assessment:    Patient is gradually worsening  39-year-old woman with chronic back pain which is not clearly mechanical   No objective signs or symptoms of radiculopathy  I do not think any surgical fashion for lumbar spine will significantly improve her pain, which in particular is worse at night  Her primary goal is to reduce her dose of tramadol  We will refer her to Dr Adarsh Edgar to discuss additional options with regard to pain medication  I asked her to discuss a course a membrane stabilizing agents with her PCP in the interim  Topical analgesics such as Voltaren cream or lidocaine patch may help as well  While neuromodulation form of spinal cord stimulator trial could be considered, would recommend she exhausted nonsurgical pain management strategies 1st   We will follow this office on a p r n  Basis  History, physical examination and diagnostic tests were reviewed and questions answered  Diagnosis, care plan and treatment options were discussed  The patient understand instructions and will follow up as directed  Plan:    Follow-up: prn    Problem List Items Addressed This Visit        Musculoskeletal and Integument    Lumbar pars defect       Other    Chronic low back pain - Primary    Relevant Orders    Ambulatory referral to Pain Management          Subjective/Objective     Chief Complaint    Low back pain    HPI    A 39-year-old woman with longstanding history of lower back pain  Her back pain bothers her mostly at nighttime and wakes her up from sleep  It responds well to tramadol  She denies any pain, weakness or numbness in her legs or difficulties with bowel bladder function or change in perineal sensation  Overall her pain seemed to improved to some extent with activity the extension exacerbates her pain  She has tried physical therapy with some improvement in her symptoms    In the past she underwent right-sided RFA ablation from L3-L5 with Dr Elizabeth Saunders which improved her symptoms  She did not proceed with a left-sided ablation as the cost was prohibitive  Aside from tramadol, she has not tried any other medication aside from over-the-counter oral NSAIDs  She has not tried any membrane stabilizing agents or topical analgesics  ROS  ROS personally reviewed and updated  Review of Systems   Constitutional: Negative  HENT: Negative  Eyes: Negative  Respiratory: Negative  Cardiovascular: Negative  Gastrointestinal: Negative  Endocrine: Negative  Genitourinary: Negative  Musculoskeletal: Positive for back pain (low back pain )  Negative for arthralgias, gait problem, joint swelling, myalgias, neck pain and neck stiffness  Allergic/Immunologic: Negative  Neurological: Negative  Hematological: Bruises/bleeds easily (easy bruising)  Psychiatric/Behavioral: Negative          Family History    Family History   Problem Relation Age of Onset    Diabetes Family         Mellitus    Hypertension Family     Cancer Family     Hypertension Mother     Diabetes Father     ALS Father     Lung cancer Maternal Grandfather     Ovarian cancer Paternal Grandmother     Melanoma Maternal Uncle        Social History    Social History     Socioeconomic History    Marital status: /Civil Union     Spouse name: Not on file    Number of children: Not on file    Years of education: Not on file    Highest education level: Not on file   Occupational History    Occupation: Employed   Social Needs    Financial resource strain: Not on file    Food insecurity:     Worry: Not on file     Inability: Not on file   Chroma needs:     Medical: Not on file     Non-medical: Not on file   Tobacco Use    Smoking status: Former Smoker     Packs/day: 0 25     Years: 15 00     Pack years: 3 75     Last attempt to quit:      Years since quittin 5    Smokeless tobacco: Former User     Quit date: 2014    Tobacco comment: QUIT 4 YEARS AGO   Substance and Sexual Activity    Alcohol use: No    Drug use: No    Sexual activity: Not on file   Lifestyle    Physical activity:     Days per week: Not on file     Minutes per session: Not on file    Stress: Not on file   Relationships    Social connections:     Talks on phone: Not on file     Gets together: Not on file     Attends Spiritism service: Not on file     Active member of club or organization: Not on file     Attends meetings of clubs or organizations: Not on file     Relationship status: Not on file    Intimate partner violence:     Fear of current or ex partner: Not on file     Emotionally abused: Not on file     Physically abused: Not on file     Forced sexual activity: Not on file   Other Topics Concern    Not on file   Social History Narrative    Lives w/spouse    Lives w/children       Past Medical History    Past Medical History:   Diagnosis Date    Anxiety     Last assessed: 10/11/13    Insomnia     Last assessed: 13    Sacroiliitis ([facility] [address] )     Last assessed: 10/11/13    Vitamin D deficiency     Last assessed: 10/11/13       Surgical History    Past Surgical History:   Procedure Laterality Date     SECTION      x2 Last assessed: 14    SKIN LESION EXCISION      Basal cell skin cancer excision at age 12       Medications      Current Outpatient Medications:     mupirocin (BACTROBAN) 2 % ointment, Apply topically 3 (three) times a day, Disp: 22 g, Rfl: 1    sertraline (ZOLOFT) 50 mg tablet, Take 1 tablet (50 mg total) by mouth daily, Disp: 30 tablet, Rfl: 5    traMADol (ULTRAM) 50 mg tablet, 1 tab q 4h prn pain (max 6 tabs/day), Disp: 180 tablet, Rfl: 0    traMADol (ULTRAM) 50 mg tablet, TAKE 1 TABLET EVERY 4 HOURS AS NEEDED FOR PAIN MAXIMUM 6 TABLETS DAILY, Disp: 180 tablet, Rfl: 0    Allergies    No Known Allergies    The following portions of the patient's history were reviewed and updated as appropriate: allergies, current medications, past family history, past medical history, past social history, past surgical history and problem list     Investigations    I personally reviewed the XRAY results with the patient:    Plain film lumbar spine dated November 28, 2017  Bilateral pars defect at L5-S1   1-2 mm motion on flexion which reduces in extension  Physical Exam    Vitals:  Blood pressure 100/70, pulse 79, temperature 98 °F (36 7 °C), temperature source Temporal, height 5' 2 99" (1 6 m), weight 62 6 kg (138 lb), not currently breastfeeding  ,Body mass index is 24 45 kg/m²  Physical Exam   Constitutional: She is oriented to person, place, and time  She appears well-developed and well-nourished  No distress  HENT:   Head: Atraumatic  Eyes: EOM are normal    Pulmonary/Chest: Effort normal  No respiratory distress  Musculoskeletal: Normal range of motion  She exhibits no deformity  Full range of motion on flexion-extension lumbar spine the extension produces pain  Neurological: She is alert and oriented to person, place, and time  Full power in lower extremities  Walks with a steady gait  Able to heel-toe gait  Romberg negative  Reports normal light touch and pinprick sensation lower extremities  Deep tendon reflexes 2+ in both patellar and Achilles  Skin: Skin is warm and dry  Psychiatric: She has a normal mood and affect  Her behavior is normal    Vitals reviewed  Neurologic Exam     Mental Status   Oriented to person, place, and time       Cranial Nerves     CN III, IV, VI   Extraocular motions are normal

## 2019-07-20 DIAGNOSIS — M54.9 CHRONIC BACK PAIN, UNSPECIFIED BACK LOCATION, UNSPECIFIED BACK PAIN LATERALITY: ICD-10-CM

## 2019-07-20 DIAGNOSIS — G89.29 CHRONIC BACK PAIN, UNSPECIFIED BACK LOCATION, UNSPECIFIED BACK PAIN LATERALITY: ICD-10-CM

## 2019-07-20 RX ORDER — TRAMADOL HYDROCHLORIDE 50 MG/1
TABLET ORAL
Qty: 180 TABLET | Refills: 0 | Status: SHIPPED | OUTPATIENT
Start: 2019-07-20 | End: 2019-07-25 | Stop reason: SDUPTHER

## 2019-07-25 ENCOUNTER — OFFICE VISIT (OUTPATIENT)
Dept: FAMILY MEDICINE CLINIC | Facility: CLINIC | Age: 39
End: 2019-07-25
Payer: COMMERCIAL

## 2019-07-25 VITALS
HEART RATE: 88 BPM | DIASTOLIC BLOOD PRESSURE: 64 MMHG | SYSTOLIC BLOOD PRESSURE: 106 MMHG | WEIGHT: 130.8 LBS | HEIGHT: 63 IN | TEMPERATURE: 99.3 F | BODY MASS INDEX: 23.18 KG/M2 | RESPIRATION RATE: 16 BRPM | OXYGEN SATURATION: 99 %

## 2019-07-25 DIAGNOSIS — M54.5 CHRONIC LOW BACK PAIN, UNSPECIFIED BACK PAIN LATERALITY, WITH SCIATICA PRESENCE UNSPECIFIED: Primary | ICD-10-CM

## 2019-07-25 DIAGNOSIS — F41.8 DEPRESSION WITH ANXIETY: ICD-10-CM

## 2019-07-25 DIAGNOSIS — G89.29 CHRONIC LOW BACK PAIN, UNSPECIFIED BACK PAIN LATERALITY, WITH SCIATICA PRESENCE UNSPECIFIED: Primary | ICD-10-CM

## 2019-07-25 PROCEDURE — 99214 OFFICE O/P EST MOD 30 MIN: CPT | Performed by: FAMILY MEDICINE

## 2019-07-25 PROCEDURE — 3008F BODY MASS INDEX DOCD: CPT | Performed by: FAMILY MEDICINE

## 2019-07-25 PROCEDURE — 1036F TOBACCO NON-USER: CPT | Performed by: FAMILY MEDICINE

## 2019-07-25 RX ORDER — GABAPENTIN 100 MG/1
CAPSULE ORAL
Qty: 120 CAPSULE | Refills: 0 | Status: SHIPPED | OUTPATIENT
Start: 2019-07-25 | End: 2019-10-10

## 2019-07-25 NOTE — PROGRESS NOTES
[facility]      NAME: Lulu Urrutia  AGE: 43 y o  SEX: female  : 1980   MRN: [id number]    DATE: 2019  TIME: 9:46 AM    Assessment and Plan     Problem List Items Addressed This Visit     Chronic low back pain - Primary     Patient presents to discuss ongoing chronic low back pain  Patient still has ongoing pain that requires tramadol, 6-8 tablets per day  She is developing a tolerance to this, and medication does not work as well as it used to  Patient has had history of rhizotomy performed a few years ago by pain management  This provided some improvement  She was recently seen by Neurosurgery for a 2nd opinion recently  They did not think she was a surgical candidate, but recommended return to pain management  Recommend starting gabapentin, will start at 200 mg daily and increase to 400 mg daily  Will also refer her to pain management (Dr Timi Wilson)  I would like to see her back in 1 month             Relevant Medications    gabapentin (NEURONTIN) 100 mg capsule    Other Relevant Orders    Ambulatory referral to Pain Management    Depression with anxiety     Patient had side effects to sertraline and discontinued med  She states that overall her depression has improved  Most of it was situational due to her dog dying a, her grandfather dying, and her father being diagnosed with ALS                   Return to office in:  [address] 1 month    Chief Complaint     Chief Complaint   Patient presents with    Follow-up     Chronic Back Pain       History of Present Illness     Patient presents to discuss ongoing chronic low back pain  Patient still has ongoing pain that requires tramadol, 6-8 tablets per day  She is developing a tolerance to this, and medication does not work as well as it used to  Patient has had history of rhizotomy performed a few years ago by pain management  This provided some improvement    She was recently seen by Neurosurgery for a 2nd opinion recently  They did not think she was a surgical candidate, but recommended return to pain management  Patient had side effects to sertraline so discontinued med  Overall her depression has improved      The following portions of the patient's history were reviewed and updated as appropriate: allergies, current medications, past family history, past medical history, past social history, past surgical history and problem list     Review of Systems   Review of Systems   Respiratory: Negative  Cardiovascular: Negative  Gastrointestinal: Negative  Genitourinary: Negative  Musculoskeletal: Positive for back pain  Active Problem List     Patient Active Problem List   Diagnosis    Chronic low back pain    Lumbar spondylosis    Depression with anxiety    Basal cell carcinoma of back    Insomnia    Lumbar pars defect    Cervical adenopathy    Cystitis    Impetigo       Objective   /64 (BP Location: Left arm, Patient Position: Sitting, Cuff Size: Standard)   Pulse 88   Temp 99 3 °F (37 4 °C) (Tympanic)   Resp 16   Ht 5' 2 99" (1 6 m)   Wt 59 3 kg (130 lb 12 8 oz)   LMP 06/22/2019   SpO2 99%   BMI 23 18 kg/m²     Physical Exam   Cardiovascular: Normal rate, regular rhythm, normal heart sounds and intact distal pulses  Carotids: no bruits  Ext: no edema   Pulmonary/Chest: Effort normal  No respiratory distress  She has no wheezes  She has no rales  Psychiatric: She has a normal mood and affect   Her behavior is normal  Thought content normal        Pertinent Laboratory/Diagnostic Studies:  Neurosurgery consult    Current Medications     Current Outpatient Medications:     mupirocin (BACTROBAN) 2 % ointment, Apply topically 3 (three) times a day, Disp: 22 g, Rfl: 1    traMADol (ULTRAM) 50 mg tablet, 1 tab q 4h prn pain (max 6 tabs/day), Disp: 180 tablet, Rfl: 0    gabapentin (NEURONTIN) 100 mg capsule, 1 cap BID x 3 days, then 1 cap AM, 2 caps PM x 3 days, then 2 caps BID, Disp: 120 capsule, Rfl: 0    Health Maintenance     Health Maintenance   Topic Date Due    Pneumococcal Vaccine: Pediatrics (0 to 5 Years) and At-Risk Patients (6 to 59 Years) (1 of 3 - PCV13) 12/20/1985    HEPATITIS B VACCINES (3 of 3 - 3-dose primary series) 07/22/1999    INFLUENZA VACCINE  02/11/2020 (Originally 7/1/2019)    Depression Screening PHQ  09/19/2019    BMI: Adult  07/17/2020    DTaP,Tdap,and Td Vaccines (2 - Td) 07/01/2025    Pneumococcal Vaccine: 65+ Years (1 of 2 - PCV13) 12/20/2044     Immunization History   Administered Date(s) Administered    Hep B, adult 12/17/1998, 05/27/1999    MMR 12/17/1998    Tdap 07/01/2015       Hai Loera DO  [facility]

## 2019-07-25 NOTE — ASSESSMENT & PLAN NOTE
Patient presents to discuss ongoing chronic low back pain  Patient still has ongoing pain that requires tramadol, 6-8 tablets per day  She is developing a tolerance to this, and medication does not work as well as it used to  Patient has had history of rhizotomy performed a few years ago by pain management  This provided some improvement  She was recently seen by Neurosurgery for a 2nd opinion recently  They did not think she was a surgical candidate, but recommended return to pain management  Recommend starting gabapentin, will start at 200 mg daily and increase to 400 mg daily  Will also refer her to pain management (Dr Lambert Kelley)    I would like to see her back in 1 month

## 2019-07-25 NOTE — ASSESSMENT & PLAN NOTE
Patient had side effects to sertraline and discontinued med  She states that overall her depression has improved    Most of it was situational due to her dog dying a, her grandfather dying, and her father being diagnosed with ALS

## 2019-08-06 DIAGNOSIS — M47.816 LUMBAR SPONDYLOSIS: ICD-10-CM

## 2019-08-06 DIAGNOSIS — F41.8 DEPRESSION WITH ANXIETY: Primary | ICD-10-CM

## 2019-08-06 RX ORDER — DULOXETIN HYDROCHLORIDE 60 MG/1
60 CAPSULE, DELAYED RELEASE ORAL DAILY
Qty: 30 CAPSULE | Refills: 2 | Status: SHIPPED | OUTPATIENT
Start: 2019-08-06 | End: 2019-11-07 | Stop reason: SDUPTHER

## 2019-08-16 DIAGNOSIS — M54.9 CHRONIC BACK PAIN, UNSPECIFIED BACK LOCATION, UNSPECIFIED BACK PAIN LATERALITY: ICD-10-CM

## 2019-08-16 DIAGNOSIS — G89.29 CHRONIC BACK PAIN, UNSPECIFIED BACK LOCATION, UNSPECIFIED BACK PAIN LATERALITY: ICD-10-CM

## 2019-08-16 RX ORDER — TRAMADOL HYDROCHLORIDE 50 MG/1
TABLET ORAL
Qty: 180 TABLET | Refills: 0 | Status: CANCELLED | OUTPATIENT
Start: 2019-08-16

## 2019-08-17 DIAGNOSIS — G89.29 CHRONIC BACK PAIN, UNSPECIFIED BACK LOCATION, UNSPECIFIED BACK PAIN LATERALITY: ICD-10-CM

## 2019-08-17 DIAGNOSIS — M54.9 CHRONIC BACK PAIN, UNSPECIFIED BACK LOCATION, UNSPECIFIED BACK PAIN LATERALITY: ICD-10-CM

## 2019-08-19 RX ORDER — TRAMADOL HYDROCHLORIDE 50 MG/1
TABLET ORAL
Qty: 180 TABLET | Refills: 0 | Status: SHIPPED | OUTPATIENT
Start: 2019-08-19 | End: 2019-09-13 | Stop reason: SDUPTHER

## 2019-09-11 ENCOUNTER — TELEPHONE (OUTPATIENT)
Dept: FAMILY MEDICINE CLINIC | Facility: CLINIC | Age: 39
End: 2019-09-11

## 2019-09-11 NOTE — TELEPHONE ENCOUNTER
----- Message from Ellis Hill sent at 9/11/2019  4:56 PM EDT -----  Regarding: RE: RE: RE: Visit Follow-Up Question  Contact: [phone]  Hi Dr Lora Seo,    I had to cancel my appointment I had on 8/29 because I couldn't get off of work  I should be able to get out a little early or go in late in like two weeks, so I am francia to call  My back is feeling better being on cymbalta  I haven't woken up in pain in about a week and a half, so I think it's working! I am still taking my tramadol and did experiment not taking and it had pain  So maybe them both together works better, I don't know  I was thinking at my next appointment we can get a plan together  Maybe start lowering the tramadol in a month or so, like one a month or something like that  Thank you! Yamini Still    ----- Message -----  From: Mya Scott DO  Sent: 8/7/19, 2:40 PM  To: Ellis Hill  Subject: RE: RE: Visit Follow-Up Question    Marcelino Abbasi    ----- Message -----     From: Ellis Hill     Sent: 8/7/2019  6:34 AM EDT       To: Mya Scott DO  Subject: RE: RE: Visit Follow-Up Question    Thank you Dr Lora Seo  As always I appreciate your help! Yamini Still     ----- Message -----  From: Mya Scott DO  Sent: 8/6/19, 5:22 PM  To: Ellis Hill  Subject: RE: Visit Follow-Up Question    EMILIANO Still,    Yes, we can certainly try cymbalta  I will send in prescription for your  Give me another update in about 3-4 weeks  Alondra Hi    ----- Message -----     From: Ellis Hill     Sent: 8/5/2019  1:55 PM EDT       To: Mya Scott DO  Subject: RE: Visit Follow-Up Question    Sorry I forgot to ask  What about Cymbalta  I had tried that years ago and I think it also helps with pain  I was ok on that and maybe that could help  Sorry for the additional message    ----- Message -----  From: Mya Scott DO  Sent: 8/5/2019 10:33 AM EDT  To: Ellis Hill  Subject: RE: Visit Follow-Up Question  Leitha Mcardle,    I am not sure, but that would be an unusual side effect for gabapentin  Either way, if you notice that you are feeling better since he discontinued med, we should probably discontinue it for good  We can try a different med if you are okay with that? The next med I would try is Lyrica  Let me know if you would like me to send in a prescription  Mustapha Lorenzo    ----- Message -----     From: Alayna Aguila     Sent: 8/3/2019  9:03 AM EDT       To: Alexandra Wharton, DO  Subject: Visit Follow-Up Question    Gabapentin issue  Hi Dr Leyda Ovalle been taking the gabapentin, but for a little while I have had lower back pain on the left side below my ribs, which has been constant all week  It was even worse when I would walk or go up or down the steps  I stopped taking it yesterday and it's feeling a little better today  I am not sure if there are side effects like this for this medication or if it affects my kidneys but another thing I noticed I wasn't peeing as much as normal - not sure if that is also a side effect  I wasn't sure if there was something else we could try  Gabapentin did actually help with my anxiety but the lower mid back pain was to much for me on addition to what I already have       Thank you,  Carlos Munguia

## 2019-09-11 NOTE — TELEPHONE ENCOUNTER
Call patient, I received her message  I agree with plan to try to reduce usage of tramadol    Will discuss at her next follow-up appointment

## 2019-09-12 DIAGNOSIS — G89.29 CHRONIC BACK PAIN, UNSPECIFIED BACK LOCATION, UNSPECIFIED BACK PAIN LATERALITY: ICD-10-CM

## 2019-09-12 DIAGNOSIS — M54.9 CHRONIC BACK PAIN, UNSPECIFIED BACK LOCATION, UNSPECIFIED BACK PAIN LATERALITY: ICD-10-CM

## 2019-09-12 RX ORDER — TRAMADOL HYDROCHLORIDE 50 MG/1
TABLET ORAL
Qty: 180 TABLET | Refills: 0 | Status: CANCELLED | OUTPATIENT
Start: 2019-09-12

## 2019-09-13 DIAGNOSIS — G89.29 CHRONIC BACK PAIN, UNSPECIFIED BACK LOCATION, UNSPECIFIED BACK PAIN LATERALITY: ICD-10-CM

## 2019-09-13 DIAGNOSIS — M54.9 CHRONIC BACK PAIN, UNSPECIFIED BACK LOCATION, UNSPECIFIED BACK PAIN LATERALITY: ICD-10-CM

## 2019-09-13 RX ORDER — TRAMADOL HYDROCHLORIDE 50 MG/1
TABLET ORAL
Qty: 180 TABLET | Refills: 0 | Status: SHIPPED | OUTPATIENT
Start: 2019-09-13 | End: 2019-10-10 | Stop reason: SDUPTHER

## 2019-10-10 ENCOUNTER — OFFICE VISIT (OUTPATIENT)
Dept: FAMILY MEDICINE CLINIC | Facility: CLINIC | Age: 39
End: 2019-10-10
Payer: COMMERCIAL

## 2019-10-10 VITALS
TEMPERATURE: 98.8 F | OXYGEN SATURATION: 98 % | DIASTOLIC BLOOD PRESSURE: 70 MMHG | SYSTOLIC BLOOD PRESSURE: 106 MMHG | HEIGHT: 64 IN | BODY MASS INDEX: 22.36 KG/M2 | WEIGHT: 131 LBS | HEART RATE: 93 BPM | RESPIRATION RATE: 16 BRPM

## 2019-10-10 DIAGNOSIS — G89.29 CHRONIC LOW BACK PAIN, UNSPECIFIED BACK PAIN LATERALITY, UNSPECIFIED WHETHER SCIATICA PRESENT: Primary | ICD-10-CM

## 2019-10-10 DIAGNOSIS — F41.8 DEPRESSION WITH ANXIETY: ICD-10-CM

## 2019-10-10 DIAGNOSIS — M54.9 CHRONIC BACK PAIN, UNSPECIFIED BACK LOCATION, UNSPECIFIED BACK PAIN LATERALITY: ICD-10-CM

## 2019-10-10 DIAGNOSIS — G89.29 CHRONIC BACK PAIN, UNSPECIFIED BACK LOCATION, UNSPECIFIED BACK PAIN LATERALITY: ICD-10-CM

## 2019-10-10 DIAGNOSIS — R10.9 FLANK PAIN: ICD-10-CM

## 2019-10-10 DIAGNOSIS — M54.50 CHRONIC LOW BACK PAIN, UNSPECIFIED BACK PAIN LATERALITY, UNSPECIFIED WHETHER SCIATICA PRESENT: Primary | ICD-10-CM

## 2019-10-10 PROCEDURE — 96372 THER/PROPH/DIAG INJ SC/IM: CPT | Performed by: FAMILY MEDICINE

## 2019-10-10 PROCEDURE — 3008F BODY MASS INDEX DOCD: CPT | Performed by: FAMILY MEDICINE

## 2019-10-10 PROCEDURE — 99214 OFFICE O/P EST MOD 30 MIN: CPT | Performed by: FAMILY MEDICINE

## 2019-10-10 RX ORDER — PREGABALIN 50 MG/1
50 CAPSULE ORAL 2 TIMES DAILY
Qty: 60 CAPSULE | Refills: 1 | Status: SHIPPED | OUTPATIENT
Start: 2019-10-10 | End: 2019-11-08 | Stop reason: SDUPTHER

## 2019-10-10 RX ORDER — KETOROLAC TROMETHAMINE 30 MG/ML
60 INJECTION, SOLUTION INTRAMUSCULAR; INTRAVENOUS ONCE
Status: COMPLETED | OUTPATIENT
Start: 2019-10-10 | End: 2019-10-10

## 2019-10-10 RX ORDER — TRAMADOL HYDROCHLORIDE 50 MG/1
TABLET ORAL
Qty: 210 TABLET | Refills: 0 | Status: SHIPPED | OUTPATIENT
Start: 2019-10-10 | End: 2019-11-08 | Stop reason: SDUPTHER

## 2019-10-10 RX ADMIN — KETOROLAC TROMETHAMINE 60 MG: 30 INJECTION, SOLUTION INTRAMUSCULAR; INTRAVENOUS at 09:06

## 2019-10-10 NOTE — ASSESSMENT & PLAN NOTE
Patient presents for recheck chronic problems  She is still having ongoing issues with chronic low back pain  Her usual pain is in the sacral area, but lately it is bilateral lumbar/flank  Denies any urinary frequency or dysuria  Denies any fevers or chills  She does get some relief from tramadol 50 mg, 6 tablets daily  She tried gabapentin, but had side effects and discontinued this  She is still taking Cymbalta 60 mg daily  Status post rhizotomy  She has been seen by pain management and Neurosurgery, and deemed not to be a surgical candidate  She has not made appointment for 2nd opinion for pain management yet (Dr Pedraza Mom)  NOTE:  During her appointment, patient experienced moderate to severe low back spasms  She was given  Toradol 60 mg IM with good relief  Will temporarily increase her tramadol from 6 to 7  Tabs daily  Will also give Rx for Lyrica 50 mg b i d  ( she will continue Cymbalta 60)  Lastly, will send for retroperitoneal ultrasound and check urinalysis ( patient will drop off specimen)  Will call with results   Patient was told to go directly to emergency department should her symptoms worsen     I would like to see her back in 3-4 weeks

## 2019-10-10 NOTE — ASSESSMENT & PLAN NOTE
( see chronic low back pain)    Patient without urinary symptoms, but due to flank location, urinalysis was checked today

## 2019-10-10 NOTE — ASSESSMENT & PLAN NOTE
Patient is on Cymbalta 60 mg daily for depression and pain management    Recommend continuing this dosage for now

## 2019-10-10 NOTE — PROGRESS NOTES
[facility]      NAME: Joyce Liz  AGE: 43 y o  SEX: female  : 1980   MRN: [id number]    DATE: 10/10/2019  TIME: 9:33 AM    Assessment and Plan     Problem List Items Addressed This Visit     Chronic low back pain - Primary      Patient presents for recheck chronic problems  She is still having ongoing issues with chronic low back pain  Her usual pain is in the sacral area, but lately it is bilateral lumbar/flank  Denies any urinary frequency or dysuria  Denies any fevers or chills  She does get some relief from tramadol 50 mg, 6 tablets daily  She tried gabapentin, but had side effects and discontinued this  She is still taking Cymbalta 60 mg daily  Status post rhizotomy  She has been seen by pain management and Neurosurgery, and deemed not to be a surgical candidate  She has not made appointment for 2nd opinion for pain management yet (Dr Jimbo Espino)  NOTE:  During her appointment, patient experienced moderate to severe low back spasms  She was given  Toradol 60 mg IM with good relief  Will temporarily increase her tramadol from 6 to 7  Tabs daily  Will also give Rx for Lyrica 50 mg b i d  ( she will continue Cymbalta 60)  Lastly, will send for retroperitoneal ultrasound and check urinalysis ( patient will drop off specimen)  Will call with results  Patient was told to go directly to emergency department should her symptoms worsen     I would like to see her back in 3-4 weeks           Relevant Medications    ketorolac (TORADOL) 60 mg/2 mL IM injection 60 mg (Completed)    pregabalin (LYRICA) 50 mg capsule    Depression with anxiety      Patient is on Cymbalta 60 mg daily for depression and pain management  Recommend continuing this dosage for now         Flank pain     ( see chronic low back pain)    Patient without urinary symptoms, but due to flank location, urinalysis was checked today         Relevant Medications    ketorolac (TORADOL) 60 mg/2 mL IM injection 60 mg (Completed)    pregabalin (LYRICA) 50 mg capsule    Other Relevant Orders    UA w Reflex to Microscopic w Reflex to Culture - Clinic Collect    US retroperitoneal complete      Other Visit Diagnoses     Chronic back pain, unspecified back location, unspecified back pain laterality        Relevant Medications    traMADol (ULTRAM) 50 mg tablet              Return to office in:  3-4 weeks    Chief Complaint     Chief Complaint   Patient presents with    Follow-up     3M       History of Present Illness      Patient presents for recheck chronic problems  She is still having ongoing issues with chronic low back pain  Her usual pain is in the sacral area, but lately it is bilateral lumbar/flank  Denies any urinary frequency or dysuria  Denies any fevers or chills  She does get some relief from tramadol 50 mg, 6 tablets daily  She tried gabapentin, but had side effects and discontinued this  She is still taking Cymbalta 60 mg daily         The following portions of the patient's history were reviewed and updated as appropriate: allergies, current medications, past family history, past medical history, past social history, past surgical history and problem list     Review of Systems   Review of Systems    Active Problem List     Patient Active Problem List   Diagnosis    Chronic low back pain    Lumbar spondylosis    Depression with anxiety    Basal cell carcinoma of back    Insomnia    Lumbar pars defect    Cervical adenopathy    Cystitis    Impetigo    Flank pain       Objective   /70 (BP Location: Left arm, Patient Position: Sitting, Cuff Size: Standard)   Pulse 93   Temp 98 8 °F (37 1 °C) (Tympanic)   Resp 16   Ht 5' 4" (1 626 m)   Wt 59 4 kg (131 lb)   SpO2 98%   BMI 22 49 kg/m²     Physical Exam    Pertinent Laboratory/Diagnostic Studies:  None    Current Medications     Current Outpatient Medications:     DULoxetine (CYMBALTA) 60 mg delayed release capsule, Take 1 capsule (60 mg total) by mouth daily, Disp: 30 capsule, Rfl: 2    mupirocin (BACTROBAN) 2 % ointment, Apply topically 3 (three) times a day, Disp: 22 g, Rfl: 1    traMADol (ULTRAM) 50 mg tablet, 1-2 tabs q 6 prn (max 7 tabs/day), Disp: 210 tablet, Rfl: 0    pregabalin (LYRICA) 50 mg capsule, Take 1 capsule (50 mg total) by mouth 2 (two) times a day, Disp: 60 capsule, Rfl: 1  No current facility-administered medications for this visit       Health Maintenance     Health Maintenance   Topic Date Due    Pneumococcal Vaccine: Pediatrics (0 to 5 Years) and At-Risk Patients (6 to 59 Years) (1 of 3 - PCV13) 12/20/1985    HEPATITIS B VACCINES (3 of 3 - 3-dose primary series) 07/22/1999    Cervical Cancer Screening  12/20/2000    INFLUENZA VACCINE  02/11/2020 (Originally 7/1/2019)    BMI: Adult  07/25/2020    DTaP,Tdap,and Td Vaccines (2 - Td) 07/01/2025    Pneumococcal Vaccine: 65+ Years (1 of 2 - PCV13) 12/20/2044     Immunization History   Administered Date(s) Administered    Hep B, adult 12/17/1998, 05/27/1999    MMR 12/17/1998    Tdap 07/01/2015       Aaron Spencer DO  [facility]

## 2019-10-11 ENCOUNTER — HOSPITAL ENCOUNTER (OUTPATIENT)
Dept: ULTRASOUND IMAGING | Facility: MEDICAL CENTER | Age: 39
Discharge: HOME | End: 2019-10-11
Payer: COMMERCIAL

## 2019-10-11 DIAGNOSIS — R10.9 FLANK PAIN: ICD-10-CM

## 2019-10-11 PROCEDURE — 76770 US EXAM ABDO BACK WALL COMP: CPT

## 2019-10-12 LAB
APPEARANCE UR: ABNORMAL
BACTERIA UR CULT: ABNORMAL
BACTERIA UR QL AUTO: ABNORMAL /HPF
BILIRUB UR QL STRIP: NEGATIVE
CAOX CRY #/AREA URNS HPF: ABNORMAL /HPF
COLOR UR: ABNORMAL
GLUCOSE UR QL STRIP: NEGATIVE
HGB UR QL STRIP: NEGATIVE
HYALINE CASTS #/AREA URNS LPF: ABNORMAL /LPF
KETONES UR QL STRIP: ABNORMAL
LEUKOCYTE ESTERASE UR QL STRIP: NEGATIVE
NITRITE UR QL STRIP: NEGATIVE
PH UR STRIP: ABNORMAL [PH] (ref 5–8)
PROT UR QL STRIP: NEGATIVE
RBC #/AREA URNS HPF: ABNORMAL /HPF
SERVICE CMNT-IMP: ABNORMAL
SP GR UR STRIP: 1.03 (ref 1–1.03)
SQUAMOUS #/AREA URNS HPF: ABNORMAL /HPF
WBC #/AREA URNS HPF: ABNORMAL /HPF

## 2019-10-14 ENCOUNTER — TELEPHONE (OUTPATIENT)
Dept: FAMILY MEDICINE CLINIC | Facility: CLINIC | Age: 39
End: 2019-10-14

## 2019-10-14 NOTE — TELEPHONE ENCOUNTER
----- Message from Micheal Johnson DO sent at 10/12/2019  3:18 PM EDT -----  Call patient, her urinalysis showed some bacteria, which could be contamination  Or a mild infection  We are still waiting for her  Urine culture report    Will call as soon as this is  available

## 2019-11-07 DIAGNOSIS — M47.816 LUMBAR SPONDYLOSIS: ICD-10-CM

## 2019-11-07 DIAGNOSIS — F41.8 DEPRESSION WITH ANXIETY: ICD-10-CM

## 2019-11-07 RX ORDER — DULOXETIN HYDROCHLORIDE 60 MG/1
CAPSULE, DELAYED RELEASE ORAL
Qty: 31 CAPSULE | Refills: 2 | Status: SHIPPED | OUTPATIENT
Start: 2019-11-07 | End: 2019-11-27

## 2019-11-08 ENCOUNTER — OFFICE VISIT (OUTPATIENT)
Dept: FAMILY MEDICINE CLINIC | Facility: CLINIC | Age: 39
End: 2019-11-08
Payer: COMMERCIAL

## 2019-11-08 VITALS
BODY MASS INDEX: 22.67 KG/M2 | HEART RATE: 74 BPM | DIASTOLIC BLOOD PRESSURE: 74 MMHG | OXYGEN SATURATION: 99 % | TEMPERATURE: 98.1 F | SYSTOLIC BLOOD PRESSURE: 94 MMHG | HEIGHT: 64 IN | WEIGHT: 132.8 LBS

## 2019-11-08 DIAGNOSIS — M54.50 CHRONIC LOW BACK PAIN, UNSPECIFIED BACK PAIN LATERALITY, UNSPECIFIED WHETHER SCIATICA PRESENT: Primary | ICD-10-CM

## 2019-11-08 DIAGNOSIS — G89.29 CHRONIC LOW BACK PAIN, UNSPECIFIED BACK PAIN LATERALITY, UNSPECIFIED WHETHER SCIATICA PRESENT: Primary | ICD-10-CM

## 2019-11-08 DIAGNOSIS — J06.9 UPPER RESPIRATORY TRACT INFECTION, UNSPECIFIED TYPE: ICD-10-CM

## 2019-11-08 DIAGNOSIS — R10.9 FLANK PAIN: ICD-10-CM

## 2019-11-08 DIAGNOSIS — M54.9 CHRONIC BACK PAIN, UNSPECIFIED BACK LOCATION, UNSPECIFIED BACK PAIN LATERALITY: ICD-10-CM

## 2019-11-08 DIAGNOSIS — G89.29 CHRONIC BACK PAIN, UNSPECIFIED BACK LOCATION, UNSPECIFIED BACK PAIN LATERALITY: ICD-10-CM

## 2019-11-08 PROCEDURE — 1036F TOBACCO NON-USER: CPT | Performed by: FAMILY MEDICINE

## 2019-11-08 PROCEDURE — 99214 OFFICE O/P EST MOD 30 MIN: CPT | Performed by: FAMILY MEDICINE

## 2019-11-08 RX ORDER — TRAMADOL HYDROCHLORIDE 50 MG/1
TABLET ORAL
Qty: 180 TABLET | Refills: 0 | Status: SHIPPED | OUTPATIENT
Start: 2019-11-08 | End: 2019-12-02 | Stop reason: SDUPTHER

## 2019-11-08 RX ORDER — PREGABALIN 50 MG/1
50 CAPSULE ORAL 2 TIMES DAILY
Qty: 60 CAPSULE | Refills: 2 | Status: SHIPPED | OUTPATIENT
Start: 2019-11-08 | End: 2020-02-12

## 2019-11-08 RX ORDER — AZITHROMYCIN 250 MG/1
TABLET, FILM COATED ORAL
Qty: 6 TABLET | Refills: 0 | Status: SHIPPED | OUTPATIENT
Start: 2019-11-08 | End: 2019-11-13

## 2019-11-08 NOTE — PROGRESS NOTES
[facility]      NAME: Preet Osorio  AGE: 43 y o  SEX: female  : 1980   MRN: [id number]    DATE: 2019  TIME: 9:50 AM    Assessment and Plan     Problem List Items Addressed This Visit     Chronic low back pain - Primary     Patient presents for recheck of chronic medical problems today  Overall her back pain has been stable on tramadol 50 mg, 7 tablets daily  She has not experienced any further nausea spells since she stopped her muscle relaxer after last appointment  longstanding history of low back pain  She has tried multiple meds in the past   Including gabapentin  (She had side effects to this and discontinued)  Currently taking Cymbalta 60 mg daily, Lyrica 50 b i d , and tramadol (as above )  She has been seen by Pain Management and Neurosurgery  She is deemed not to be a surgical candidate  She is status post rhizotomy  Will continue to manage her symptoms with medication  If symptoms worsen, patient  Is agreeable to getting a 2nd opinion with another pain management specialist ([facility])  Will reduce tramadol to 6 tablets daily  Continue same Lyrica 50 b i d  For now, but consider increasing this dosage at a future visit  I would like to see her back in 3 months           Relevant Medications    pregabalin (LYRICA) 50 mg capsule    Flank pain    Relevant Medications    pregabalin (LYRICA) 50 mg capsule      Other Visit Diagnoses     Chronic back pain, unspecified back location, unspecified back pain laterality        Relevant Medications    traMADol (ULTRAM) 50 mg tablet    Upper respiratory tract infection, unspecified type         probably viral   Rx given for Zithromax to fill if symptoms persist or worsen  Drink plenty of fluids    Call further problems    Relevant Medications    azithromycin (ZITHROMAX) 250 mg tablet              Return to office in:  [address] 3 months    Chief Complaint     Chief Complaint   Patient presents with    Follow-up back pain- improved    Sore Throat     started rodrigo 11/3/19    Fatigue       History of Present Illness      Patient presents for recheck of chronic medical problems today  Overall her back pain has been stable on tramadol 50 mg, 7 tablets daily  She has not experienced any further nausea spells since she stopped her muscle relaxer after last appointment  Patient has been complaining of URI symptoms for the past week  Sore throat, postnasal drip, congestion  No fevers      The following portions of the patient's history were reviewed and updated as appropriate: allergies, current medications, past family history, past medical history, past social history, past surgical history and problem list     Review of Systems   Review of Systems   Constitutional: Negative for chills and fever  HENT: Positive for congestion and postnasal drip  Respiratory: Positive for cough  Negative for shortness of breath  Cardiovascular: Negative  Gastrointestinal: Negative  Genitourinary: Negative  Musculoskeletal: Positive for back pain  Active Problem List     Patient Active Problem List   Diagnosis    Chronic low back pain    Lumbar spondylosis    Depression with anxiety    Basal cell carcinoma of back    Insomnia    Lumbar pars defect    Cervical adenopathy    Cystitis    Impetigo    Flank pain       Objective   BP 94/74 (BP Location: Left arm, Patient Position: Sitting, Cuff Size: Adult)   Pulse 74   Temp 98 1 °F (36 7 °C)   Ht 5' 3 75" (1 619 m)   Wt 60 2 kg (132 lb 12 8 oz)   LMP 11/06/2019 (Exact Date)   SpO2 99%   BMI 22 97 kg/m²     Physical Exam   Constitutional: She appears well-developed and well-nourished  HENT:   Turbinates inflamed   Neck: Normal range of motion  Neck supple     Pulmonary/Chest: Effort normal and breath sounds normal        Pertinent Laboratory/Diagnostic Studies:   none    Current Medications     Current Outpatient Medications:     DULoxetine (CYMBALTA) 60 mg delayed release capsule, TAKE 1 CAPSULE BY MOUTH EVERY DAY, Disp: 31 capsule, Rfl: 2    mupirocin (BACTROBAN) 2 % ointment, Apply topically 3 (three) times a day, Disp: 22 g, Rfl: 1    pregabalin (LYRICA) 50 mg capsule, Take 1 capsule (50 mg total) by mouth 2 (two) times a day, Disp: 60 capsule, Rfl: 2    traMADol (ULTRAM) 50 mg tablet, 1-2 tabs q 6 prn (max 6 tabs daily), Disp: 180 tablet, Rfl: 0    azithromycin (ZITHROMAX) 250 mg tablet, 2 tabs on day 1, then 1 tab daily x 4 days, Disp: 6 tablet, Rfl: 0    Health Maintenance     Health Maintenance   Topic Date Due    Pneumococcal Vaccine: Pediatrics (0 to 5 Years) and At-Risk Patients (6 to 59 Years) (1 of 3 - PCV13) 12/20/1985    HEPATITIS B VACCINES (3 of 3 - 3-dose primary series) 07/22/1999    INFLUENZA VACCINE  02/11/2020 (Originally 7/1/2019)    BMI: Adult  10/10/2020    Cervical Cancer Screening  03/14/2023    DTaP,Tdap,and Td Vaccines (2 - Td) 07/01/2025    Pneumococcal Vaccine: 65+ Years (1 of 2 - PCV13) 12/20/2044     Immunization History   Administered Date(s) Administered    Hep B, adult 12/17/1998, 05/27/1999    MMR 12/17/1998    Tdap 07/01/2015       DO Gilberto Chatterjee [facility]

## 2019-11-08 NOTE — ASSESSMENT & PLAN NOTE
Patient presents for recheck of chronic medical problems today  Overall her back pain has been stable on tramadol 50 mg, 7 tablets daily  She has not experienced any further nausea spells since she stopped her muscle relaxer after last appointment  longstanding history of low back pain  She has tried multiple meds in the past   Including gabapentin  (She had side effects to this and discontinued)  Currently taking Cymbalta 60 mg daily, Lyrica 50 b i d , and tramadol (as above )  She has been seen by Pain Management and Neurosurgery  She is deemed not to be a surgical candidate  She is status post rhizotomy  Will continue to manage her symptoms with medication  If symptoms worsen, patient  Is agreeable to getting a 2nd opinion with another pain management specialist ([facility])  Will reduce tramadol to 6 tablets daily  Continue same Lyrica 50 b i d  For now, but consider increasing this dosage at a future visit        I would like to see her back in 3 months

## 2019-11-27 DIAGNOSIS — F41.8 DEPRESSION WITH ANXIETY: ICD-10-CM

## 2019-11-27 DIAGNOSIS — M47.816 LUMBAR SPONDYLOSIS: ICD-10-CM

## 2019-11-27 RX ORDER — DULOXETIN HYDROCHLORIDE 30 MG/1
CAPSULE, DELAYED RELEASE ORAL
Qty: 90 CAPSULE | Refills: 2 | Status: SHIPPED | OUTPATIENT
Start: 2019-11-27 | End: 2020-05-25

## 2019-12-02 DIAGNOSIS — G89.29 CHRONIC BACK PAIN, UNSPECIFIED BACK LOCATION, UNSPECIFIED BACK PAIN LATERALITY: ICD-10-CM

## 2019-12-02 DIAGNOSIS — M54.9 CHRONIC BACK PAIN, UNSPECIFIED BACK LOCATION, UNSPECIFIED BACK PAIN LATERALITY: ICD-10-CM

## 2019-12-02 RX ORDER — TRAMADOL HYDROCHLORIDE 50 MG/1
TABLET ORAL
Qty: 180 TABLET | Refills: 0 | Status: SHIPPED | OUTPATIENT
Start: 2019-12-07 | End: 2020-01-03 | Stop reason: SDUPTHER

## 2020-01-03 DIAGNOSIS — M54.9 CHRONIC BACK PAIN, UNSPECIFIED BACK LOCATION, UNSPECIFIED BACK PAIN LATERALITY: ICD-10-CM

## 2020-01-03 DIAGNOSIS — G89.29 CHRONIC BACK PAIN, UNSPECIFIED BACK LOCATION, UNSPECIFIED BACK PAIN LATERALITY: ICD-10-CM

## 2020-01-04 RX ORDER — TRAMADOL HYDROCHLORIDE 50 MG/1
TABLET ORAL
Qty: 180 TABLET | Refills: 0 | Status: SHIPPED | OUTPATIENT
Start: 2020-01-04 | End: 2020-02-12 | Stop reason: ALTCHOICE

## 2020-01-04 RX ORDER — TRAMADOL HYDROCHLORIDE 50 MG/1
TABLET ORAL
Qty: 180 TABLET | Refills: 0 | Status: SHIPPED | OUTPATIENT
Start: 2020-01-07 | End: 2020-02-02 | Stop reason: SDUPTHER

## 2020-01-06 ENCOUNTER — TELEPHONE (OUTPATIENT)
Dept: NEUROSURGERY | Facility: CLINIC | Age: 40
End: 2020-01-06

## 2020-01-07 ENCOUNTER — TELEPHONE (OUTPATIENT)
Dept: FAMILY MEDICINE CLINIC | Facility: CLINIC | Age: 40
End: 2020-01-07

## 2020-02-02 DIAGNOSIS — M54.9 CHRONIC BACK PAIN, UNSPECIFIED BACK LOCATION, UNSPECIFIED BACK PAIN LATERALITY: ICD-10-CM

## 2020-02-02 DIAGNOSIS — G89.29 CHRONIC BACK PAIN, UNSPECIFIED BACK LOCATION, UNSPECIFIED BACK PAIN LATERALITY: ICD-10-CM

## 2020-02-03 RX ORDER — TRAMADOL HYDROCHLORIDE 50 MG/1
TABLET ORAL
Qty: 180 TABLET | Refills: 0 | Status: SHIPPED | OUTPATIENT
Start: 2020-02-03 | End: 2020-02-27 | Stop reason: SDUPTHER

## 2020-02-12 ENCOUNTER — APPOINTMENT (OUTPATIENT)
Dept: RADIOLOGY | Facility: CLINIC | Age: 40
End: 2020-02-12
Payer: COMMERCIAL

## 2020-02-12 ENCOUNTER — OFFICE VISIT (OUTPATIENT)
Dept: FAMILY MEDICINE CLINIC | Facility: CLINIC | Age: 40
End: 2020-02-12
Payer: COMMERCIAL

## 2020-02-12 VITALS
SYSTOLIC BLOOD PRESSURE: 102 MMHG | RESPIRATION RATE: 18 BRPM | OXYGEN SATURATION: 98 % | HEART RATE: 94 BPM | TEMPERATURE: 98.2 F | HEIGHT: 64 IN | DIASTOLIC BLOOD PRESSURE: 76 MMHG | BODY MASS INDEX: 22.9 KG/M2 | WEIGHT: 134.13 LBS

## 2020-02-12 DIAGNOSIS — Z13.220 SCREENING FOR CHOLESTEROL LEVEL: ICD-10-CM

## 2020-02-12 DIAGNOSIS — M54.50 CHRONIC LOW BACK PAIN, UNSPECIFIED BACK PAIN LATERALITY, UNSPECIFIED WHETHER SCIATICA PRESENT: Primary | ICD-10-CM

## 2020-02-12 DIAGNOSIS — E55.9 VITAMIN D DEFICIENCY: ICD-10-CM

## 2020-02-12 DIAGNOSIS — K59.09 CHRONIC CONSTIPATION: ICD-10-CM

## 2020-02-12 DIAGNOSIS — R10.9 FLANK PAIN: ICD-10-CM

## 2020-02-12 DIAGNOSIS — Z12.31 ENCOUNTER FOR SCREENING MAMMOGRAM FOR BREAST CANCER: ICD-10-CM

## 2020-02-12 DIAGNOSIS — G89.29 CHRONIC LOW BACK PAIN, UNSPECIFIED BACK PAIN LATERALITY, UNSPECIFIED WHETHER SCIATICA PRESENT: Primary | ICD-10-CM

## 2020-02-12 DIAGNOSIS — Z13.1 SCREENING FOR DIABETES MELLITUS: ICD-10-CM

## 2020-02-12 DIAGNOSIS — Z13.29 SCREENING FOR THYROID DISORDER: ICD-10-CM

## 2020-02-12 DIAGNOSIS — Z13.0 SCREENING FOR DEFICIENCY ANEMIA: ICD-10-CM

## 2020-02-12 PROCEDURE — 3008F BODY MASS INDEX DOCD: CPT | Performed by: FAMILY MEDICINE

## 2020-02-12 PROCEDURE — 99214 OFFICE O/P EST MOD 30 MIN: CPT | Performed by: FAMILY MEDICINE

## 2020-02-12 PROCEDURE — 74022 RADEX COMPL AQT ABD SERIES: CPT

## 2020-02-12 PROCEDURE — 1036F TOBACCO NON-USER: CPT | Performed by: FAMILY MEDICINE

## 2020-02-12 RX ORDER — PREGABALIN 75 MG/1
75 CAPSULE ORAL 2 TIMES DAILY
Qty: 60 CAPSULE | Refills: 2 | Status: SHIPPED | OUTPATIENT
Start: 2020-02-12 | End: 2020-08-19

## 2020-02-12 RX ORDER — DULOXETIN HYDROCHLORIDE 60 MG/1
60 CAPSULE, DELAYED RELEASE ORAL DAILY
COMMUNITY
Start: 2019-12-06 | End: 2020-08-19

## 2020-02-12 NOTE — PATIENT INSTRUCTIONS
For constipation: try Benefiber: start w/ 1 tablespoon in 8 oz of water nightly  May increase by 1 teaspoon every 2-3 days until desired effect achieved

## 2020-02-12 NOTE — ASSESSMENT & PLAN NOTE
Still with ongoing chronic low back pain  She has been able to successfully decrease tramadol from 50 tablets, 7 tabs, 2 6 tablets daily  Other than constipation, she is not experiencing any side effects  She is also still on Lyrica 50 mg b i d   And Cymbalta 60  She seems to be tolerating these well  Patient has tried multiple medications in the past including gabapentin  Patient has been seen by pain management and Neurosurgery in the past   She was not deemed to be a surgical candidate  Status post  Rhizotomy  Recommend increasing Lyrica to 75 b i d   Continue tramadol 50 mg 6 tablets daily  I asked patient to attempt to lower this by next appointment if possible  Will continue to manage medications as long as patient is stable    Consider referral to another pain management specialist ([facility])  If symptoms worsen

## 2020-02-12 NOTE — ASSESSMENT & PLAN NOTE
Patient has been suffering from chronic constipation for years, since being on tramadol  Her normal bowel frequency is twice weekly, but lately it is down to once weekly  She denies any blood in stools  Exam today reveals decreased breath sounds, otherwise normal exam   Will sent for obstruction series    I also advised patient to start Benefiber, titrating to desired effect

## 2020-02-12 NOTE — PROGRESS NOTES
50 [facility]      NAME: Kobe Boswell  AGE: 35 y o  SEX: female  : 1980   MRN: [id number]    DATE: 2020  TIME: 9:22 AM    Assessment and Plan     Problem List Items Addressed This Visit     Chronic low back pain - Primary      Still with ongoing chronic low back pain  She has been able to successfully decrease tramadol from 50 tablets, 7 tabs, 2 6 tablets daily  Other than constipation, she is not experiencing any side effects  She is also still on Lyrica 50 mg b i d   And Cymbalta 60  She seems to be tolerating these well  Patient has tried multiple medications in the past including gabapentin  Patient has been seen by pain management and Neurosurgery in the past   She was not deemed to be a surgical candidate  Status post  Rhizotomy  Recommend increasing Lyrica to 75 b i d   Continue tramadol 50 mg 6 tablets daily  I asked patient to attempt to lower this by next appointment if possible  Will continue to manage medications as long as patient is stable  Consider referral to another pain management specialist ([facility])  If symptoms worsen         Relevant Medications    pregabalin (LYRICA) 75 mg capsule    Flank pain    Relevant Medications    pregabalin (LYRICA) 75 mg capsule    Chronic constipation       Patient has been suffering from chronic constipation for years, since being on tramadol  Her normal bowel frequency is twice weekly, but lately it is down to once weekly  She denies any blood in stools  Exam today reveals decreased breath sounds, otherwise normal exam   Will sent for obstruction series    I also advised patient to start Benefiber, titrating to desired effect         Relevant Orders    XR abdomen obstruction series      Other Visit Diagnoses     Encounter for screening mammogram for breast cancer        Relevant Orders    Mammo screening bilateral w 3d & cad    Vitamin D deficiency        Relevant Orders    Vitamin D 25 hydroxy    Screening for deficiency anemia        Relevant Orders    CBC and differential    Screening for diabetes mellitus        Relevant Orders    Comprehensive metabolic panel    Screening for cholesterol level        Relevant Orders    Lipid Panel with Direct LDL reflex    Screening for thyroid disorder        Relevant Orders    TSH, 3rd generation with Free T4 reflex              Return to office in:  3 months     patient refuses flu shot   Rx given for mammogram    Chief Complaint     Chief Complaint   Patient presents with    Follow-up     3m check up to chronic conditions with no recent labs        History of Present Illness      Recheck chronic medical problems today  Patient still with ongoing chronic low back pain  She is currently taking tramadol 50 mg, 6 tablets daily  She has been suffering from chronic constipation for years, since being on tramadol  Her normal bowel frequency is 2 times per week, but lately she is down to once per week  Denies any blood in stools  Still taking Lyrica 50 mg b i d         The following portions of the patient's history were reviewed and updated as appropriate: allergies, current medications, past family history, past medical history, past social history, past surgical history and problem list     Review of Systems   Review of Systems   Respiratory: Negative  Cardiovascular: Negative  Gastrointestinal: Negative  Genitourinary: Negative  Musculoskeletal: Positive for back pain         Active Problem List     Patient Active Problem List   Diagnosis    Chronic low back pain    Lumbar spondylosis    Depression with anxiety    Basal cell carcinoma of back    Insomnia    Lumbar pars defect    Cervical adenopathy    Cystitis    Impetigo    Flank pain    Chronic constipation       Objective   /76 (BP Location: Left arm, Patient Position: Sitting, Cuff Size: Adult)   Pulse 94   Temp 98 2 °F (36 8 °C) (Tympanic)   Resp 18   Ht 5' 4" (1 626 m)   Wt 60 8 kg (134 lb 2 oz)   LMP 01/24/2020   SpO2 98%   BMI 23 02 kg/m²     Physical Exam   Cardiovascular: Normal rate, regular rhythm, normal heart sounds and intact distal pulses  Carotids: no bruits  Ext: no edema   Pulmonary/Chest: Effort normal  No respiratory distress  She has no wheezes  She has no rales  Abdominal: Soft  Bowel sounds are normal  She exhibits no mass  There is no tenderness  No hernia  Psychiatric: She has a normal mood and affect   Her behavior is normal  Thought content normal        Pertinent Laboratory/Diagnostic Studies:   none    Current Medications     Current Outpatient Medications:     DULoxetine (CYMBALTA) 30 mg delayed release capsule, 3 caps qd, Disp: 90 capsule, Rfl: 2    mupirocin (BACTROBAN) 2 % ointment, Apply topically 3 (three) times a day, Disp: 22 g, Rfl: 1    pregabalin (LYRICA) 75 mg capsule, Take 1 capsule (75 mg total) by mouth 2 (two) times a day, Disp: 60 capsule, Rfl: 2    traMADol (ULTRAM) 50 mg tablet, 1-2 tabs q 6 prn (max 6 tabs daily), Disp: 180 tablet, Rfl: 0    DULoxetine (CYMBALTA) 60 mg delayed release capsule, Take 60 mg by mouth daily, Disp: , Rfl:     Health Maintenance     Health Maintenance   Topic Date Due    MAMMOGRAM  1980    Annual Physical  12/20/1997    Influenza Vaccine  03/10/2020 (Originally 7/1/2019)    Hepatitis B Vaccine (3 of 3 - 3-dose primary series) 02/12/2021 (Originally 7/22/1999)    BMI: Adult  11/08/2020    Cervical Cancer Screening  03/14/2023    DTaP,Tdap,and Td Vaccines (2 - Td) 07/01/2025    Pneumococcal Vaccine: 65+ Years (1 of 2 - PCV13) 12/20/2044    HIV Screening  Addressed    Pneumococcal Vaccine: Pediatrics (0 to 5 Years) and At-Risk Patients (6 to 59 Years)  Aged Out    HIB Vaccine  Aged Out    IPV Vaccine  Aged Out    Hepatitis A Vaccine  Aged Out    Meningococcal ACWY Vaccine  Aged Out    HPV Vaccine  Aged [employer] History   Administered Date(s) Administered    Hep B, adult 12/17/1998, 05/27/1999    MMR 12/17/1998    Tdap 07/01/2015       Ryan Delgadillo DO  [facility]

## 2020-02-13 ENCOUNTER — APPOINTMENT (OUTPATIENT)
Dept: LAB | Facility: CLINIC | Age: 40
End: 2020-02-13
Payer: COMMERCIAL

## 2020-02-13 DIAGNOSIS — Z13.29 SCREENING FOR THYROID DISORDER: ICD-10-CM

## 2020-02-13 DIAGNOSIS — Z13.0 SCREENING FOR DEFICIENCY ANEMIA: ICD-10-CM

## 2020-02-13 DIAGNOSIS — E55.9 VITAMIN D DEFICIENCY: ICD-10-CM

## 2020-02-13 DIAGNOSIS — Z13.1 SCREENING FOR DIABETES MELLITUS: ICD-10-CM

## 2020-02-13 DIAGNOSIS — Z13.220 SCREENING FOR CHOLESTEROL LEVEL: ICD-10-CM

## 2020-02-13 LAB
25(OH)D3 SERPL-MCNC: 21.4 NG/ML (ref 30–100)
ALBUMIN SERPL BCP-MCNC: 3.5 G/DL (ref 3.5–5)
ALP SERPL-CCNC: 71 U/L (ref 46–116)
ALT SERPL W P-5'-P-CCNC: 17 U/L (ref 12–78)
ANION GAP SERPL CALCULATED.3IONS-SCNC: 4 MMOL/L (ref 4–13)
AST SERPL W P-5'-P-CCNC: 10 U/L (ref 5–45)
BASOPHILS # BLD AUTO: 0.05 THOUSANDS/ΜL (ref 0–0.1)
BASOPHILS NFR BLD AUTO: 1 % (ref 0–1)
BILIRUB SERPL-MCNC: 0.39 MG/DL (ref 0.2–1)
BUN SERPL-MCNC: 10 MG/DL (ref 5–25)
CALCIUM SERPL-MCNC: 8.7 MG/DL (ref 8.3–10.1)
CHLORIDE SERPL-SCNC: 107 MMOL/L (ref 100–108)
CHOLEST SERPL-MCNC: 171 MG/DL (ref 50–200)
CO2 SERPL-SCNC: 26 MMOL/L (ref 21–32)
CREAT SERPL-MCNC: 0.75 MG/DL (ref 0.6–1.3)
EOSINOPHIL # BLD AUTO: 0.06 THOUSAND/ΜL (ref 0–0.61)
EOSINOPHIL NFR BLD AUTO: 1 % (ref 0–6)
ERYTHROCYTE [DISTWIDTH] IN BLOOD BY AUTOMATED COUNT: 12.5 % (ref 11.6–15.1)
GFR SERPL CREATININE-BSD FRML MDRD: 100 ML/MIN/1.73SQ M
GLUCOSE P FAST SERPL-MCNC: 88 MG/DL (ref 65–99)
HCT VFR BLD AUTO: 40 % (ref 34.8–46.1)
HDLC SERPL-MCNC: 72 MG/DL
HGB BLD-MCNC: 13 G/DL (ref 11.5–15.4)
IMM GRANULOCYTES # BLD AUTO: 0.01 THOUSAND/UL (ref 0–0.2)
IMM GRANULOCYTES NFR BLD AUTO: 0 % (ref 0–2)
LDLC SERPL CALC-MCNC: 87 MG/DL (ref 0–100)
LYMPHOCYTES # BLD AUTO: 1.31 THOUSANDS/ΜL (ref 0.6–4.47)
LYMPHOCYTES NFR BLD AUTO: 25 % (ref 14–44)
MCH RBC QN AUTO: 29.9 PG (ref 26.8–34.3)
MCHC RBC AUTO-ENTMCNC: 32.5 G/DL (ref 31.4–37.4)
MCV RBC AUTO: 92 FL (ref 82–98)
MONOCYTES # BLD AUTO: 0.45 THOUSAND/ΜL (ref 0.17–1.22)
MONOCYTES NFR BLD AUTO: 9 % (ref 4–12)
NEUTROPHILS # BLD AUTO: 3.4 THOUSANDS/ΜL (ref 1.85–7.62)
NEUTS SEG NFR BLD AUTO: 64 % (ref 43–75)
NRBC BLD AUTO-RTO: 0 /100 WBCS
PLATELET # BLD AUTO: 254 THOUSANDS/UL (ref 149–390)
PMV BLD AUTO: 10 FL (ref 8.9–12.7)
POTASSIUM SERPL-SCNC: 4 MMOL/L (ref 3.5–5.3)
PROT SERPL-MCNC: 6.2 G/DL (ref 6.4–8.2)
RBC # BLD AUTO: 4.35 MILLION/UL (ref 3.81–5.12)
SODIUM SERPL-SCNC: 137 MMOL/L (ref 136–145)
TRIGL SERPL-MCNC: 62 MG/DL
TSH SERPL DL<=0.05 MIU/L-ACNC: 1.01 UIU/ML (ref 0.36–3.74)
WBC # BLD AUTO: 5.28 THOUSAND/UL (ref 4.31–10.16)

## 2020-02-13 PROCEDURE — 85025 COMPLETE CBC W/AUTO DIFF WBC: CPT

## 2020-02-13 PROCEDURE — 84443 ASSAY THYROID STIM HORMONE: CPT

## 2020-02-13 PROCEDURE — 80061 LIPID PANEL: CPT

## 2020-02-13 PROCEDURE — 80053 COMPREHEN METABOLIC PANEL: CPT

## 2020-02-13 PROCEDURE — 36415 COLL VENOUS BLD VENIPUNCTURE: CPT

## 2020-02-13 PROCEDURE — 82306 VITAMIN D 25 HYDROXY: CPT

## 2020-02-17 ENCOUNTER — HOSPITAL ENCOUNTER (OUTPATIENT)
Dept: MAMMOGRAPHY | Facility: MEDICAL CENTER | Age: 40
Discharge: HOME | End: 2020-02-17
Payer: COMMERCIAL

## 2020-02-17 VITALS — WEIGHT: 134 LBS | BODY MASS INDEX: 22.88 KG/M2 | HEIGHT: 64 IN

## 2020-02-17 DIAGNOSIS — Z12.31 ENCOUNTER FOR SCREENING MAMMOGRAM FOR BREAST CANCER: ICD-10-CM

## 2020-02-17 PROCEDURE — 77063 BREAST TOMOSYNTHESIS BI: CPT

## 2020-02-17 PROCEDURE — 77067 SCR MAMMO BI INCL CAD: CPT

## 2020-02-19 ENCOUNTER — TELEPHONE (OUTPATIENT)
Dept: FAMILY MEDICINE CLINIC | Facility: CLINIC | Age: 40
End: 2020-02-19

## 2020-02-26 DIAGNOSIS — T40.2X5A CONSTIPATION DUE TO OPIOID THERAPY: Primary | ICD-10-CM

## 2020-02-26 DIAGNOSIS — K59.03 CONSTIPATION DUE TO OPIOID THERAPY: Primary | ICD-10-CM

## 2020-02-27 DIAGNOSIS — G89.29 CHRONIC BACK PAIN, UNSPECIFIED BACK LOCATION, UNSPECIFIED BACK PAIN LATERALITY: ICD-10-CM

## 2020-02-27 DIAGNOSIS — M54.9 CHRONIC BACK PAIN, UNSPECIFIED BACK LOCATION, UNSPECIFIED BACK PAIN LATERALITY: ICD-10-CM

## 2020-02-28 RX ORDER — TRAMADOL HYDROCHLORIDE 50 MG/1
TABLET ORAL
Qty: 180 TABLET | Refills: 0 | Status: SHIPPED | OUTPATIENT
Start: 2020-02-28 | End: 2020-03-27 | Stop reason: SDUPTHER

## 2020-03-27 DIAGNOSIS — G89.29 CHRONIC BACK PAIN, UNSPECIFIED BACK LOCATION, UNSPECIFIED BACK PAIN LATERALITY: ICD-10-CM

## 2020-03-27 DIAGNOSIS — M54.9 CHRONIC BACK PAIN, UNSPECIFIED BACK LOCATION, UNSPECIFIED BACK PAIN LATERALITY: ICD-10-CM

## 2020-03-27 RX ORDER — TRAMADOL HYDROCHLORIDE 50 MG/1
TABLET ORAL
Qty: 180 TABLET | Refills: 0 | Status: SHIPPED | OUTPATIENT
Start: 2020-03-27 | End: 2020-04-27 | Stop reason: SDUPTHER

## 2020-04-27 ENCOUNTER — TELEMEDICINE (OUTPATIENT)
Dept: FAMILY MEDICINE CLINIC | Facility: CLINIC | Age: 40
End: 2020-04-27
Payer: COMMERCIAL

## 2020-04-27 DIAGNOSIS — K59.09 CHRONIC CONSTIPATION: Primary | ICD-10-CM

## 2020-04-27 DIAGNOSIS — M54.9 CHRONIC BACK PAIN, UNSPECIFIED BACK LOCATION, UNSPECIFIED BACK PAIN LATERALITY: ICD-10-CM

## 2020-04-27 DIAGNOSIS — G89.29 CHRONIC BACK PAIN, UNSPECIFIED BACK LOCATION, UNSPECIFIED BACK PAIN LATERALITY: ICD-10-CM

## 2020-04-27 PROCEDURE — 99213 OFFICE O/P EST LOW 20 MIN: CPT | Performed by: FAMILY MEDICINE

## 2020-04-27 RX ORDER — TRAMADOL HYDROCHLORIDE 50 MG/1
TABLET ORAL
Qty: 180 TABLET | Refills: 0 | Status: SHIPPED | OUTPATIENT
Start: 2020-04-27 | End: 2020-05-22 | Stop reason: SDUPTHER

## 2020-05-14 ENCOUNTER — TELEPHONE (OUTPATIENT)
Dept: FAMILY MEDICINE CLINIC | Facility: CLINIC | Age: 40
End: 2020-05-14

## 2020-05-14 ENCOUNTER — TELEMEDICINE (OUTPATIENT)
Dept: FAMILY MEDICINE CLINIC | Facility: CLINIC | Age: 40
End: 2020-05-14
Payer: COMMERCIAL

## 2020-05-14 DIAGNOSIS — G89.29 CHRONIC LOW BACK PAIN, UNSPECIFIED BACK PAIN LATERALITY, UNSPECIFIED WHETHER SCIATICA PRESENT: Primary | ICD-10-CM

## 2020-05-14 DIAGNOSIS — K59.09 CHRONIC CONSTIPATION: ICD-10-CM

## 2020-05-14 DIAGNOSIS — M54.50 CHRONIC LOW BACK PAIN, UNSPECIFIED BACK PAIN LATERALITY, UNSPECIFIED WHETHER SCIATICA PRESENT: Primary | ICD-10-CM

## 2020-05-14 PROCEDURE — 99213 OFFICE O/P EST LOW 20 MIN: CPT | Performed by: FAMILY MEDICINE

## 2020-05-22 DIAGNOSIS — M54.9 CHRONIC BACK PAIN, UNSPECIFIED BACK LOCATION, UNSPECIFIED BACK PAIN LATERALITY: ICD-10-CM

## 2020-05-22 DIAGNOSIS — G89.29 CHRONIC BACK PAIN, UNSPECIFIED BACK LOCATION, UNSPECIFIED BACK PAIN LATERALITY: ICD-10-CM

## 2020-05-22 RX ORDER — TRAMADOL HYDROCHLORIDE 50 MG/1
TABLET ORAL
Qty: 180 TABLET | Refills: 0 | Status: SHIPPED | OUTPATIENT
Start: 2020-05-22 | End: 2020-06-22 | Stop reason: SDUPTHER

## 2020-05-23 DIAGNOSIS — M47.816 LUMBAR SPONDYLOSIS: ICD-10-CM

## 2020-05-23 DIAGNOSIS — F41.8 DEPRESSION WITH ANXIETY: ICD-10-CM

## 2020-05-25 RX ORDER — DULOXETIN HYDROCHLORIDE 30 MG/1
CAPSULE, DELAYED RELEASE ORAL
Qty: 90 CAPSULE | Refills: 2 | Status: SHIPPED | OUTPATIENT
Start: 2020-05-25 | End: 2020-08-19 | Stop reason: ALTCHOICE

## 2020-06-22 DIAGNOSIS — G89.29 CHRONIC BACK PAIN, UNSPECIFIED BACK LOCATION, UNSPECIFIED BACK PAIN LATERALITY: ICD-10-CM

## 2020-06-22 DIAGNOSIS — M54.9 CHRONIC BACK PAIN, UNSPECIFIED BACK LOCATION, UNSPECIFIED BACK PAIN LATERALITY: ICD-10-CM

## 2020-06-22 RX ORDER — TRAMADOL HYDROCHLORIDE 50 MG/1
TABLET ORAL
Qty: 180 TABLET | Refills: 0 | Status: SHIPPED | OUTPATIENT
Start: 2020-06-22 | End: 2020-07-15 | Stop reason: SDUPTHER

## 2020-06-26 ENCOUNTER — TELEPHONE (OUTPATIENT)
Dept: FAMILY MEDICINE CLINIC | Facility: CLINIC | Age: 40
End: 2020-06-26

## 2020-06-26 DIAGNOSIS — M54.9 CHRONIC BACK PAIN, UNSPECIFIED BACK LOCATION, UNSPECIFIED BACK PAIN LATERALITY: ICD-10-CM

## 2020-06-26 DIAGNOSIS — G89.29 CHRONIC BACK PAIN, UNSPECIFIED BACK LOCATION, UNSPECIFIED BACK PAIN LATERALITY: ICD-10-CM

## 2020-07-12 DIAGNOSIS — M47.816 LUMBAR SPONDYLOSIS: Primary | ICD-10-CM

## 2020-07-12 DIAGNOSIS — G89.29 CHRONIC LOW BACK PAIN, UNSPECIFIED BACK PAIN LATERALITY, UNSPECIFIED WHETHER SCIATICA PRESENT: ICD-10-CM

## 2020-07-12 DIAGNOSIS — M54.50 CHRONIC LOW BACK PAIN, UNSPECIFIED BACK PAIN LATERALITY, UNSPECIFIED WHETHER SCIATICA PRESENT: ICD-10-CM

## 2020-07-12 RX ORDER — NAPROXEN 500 MG/1
TABLET ORAL
Qty: 60 TABLET | Refills: 2 | Status: SHIPPED | OUTPATIENT
Start: 2020-07-12 | End: 2021-03-05

## 2020-07-15 DIAGNOSIS — M54.9 CHRONIC BACK PAIN, UNSPECIFIED BACK LOCATION, UNSPECIFIED BACK PAIN LATERALITY: ICD-10-CM

## 2020-07-15 DIAGNOSIS — G89.29 CHRONIC BACK PAIN, UNSPECIFIED BACK LOCATION, UNSPECIFIED BACK PAIN LATERALITY: ICD-10-CM

## 2020-07-15 RX ORDER — TRAMADOL HYDROCHLORIDE 50 MG/1
TABLET ORAL
Qty: 180 TABLET | Refills: 0 | Status: SHIPPED | OUTPATIENT
Start: 2020-07-15 | End: 2020-08-19 | Stop reason: SDUPTHER

## 2020-07-17 ENCOUNTER — TELEPHONE (OUTPATIENT)
Dept: FAMILY MEDICINE CLINIC | Facility: CLINIC | Age: 40
End: 2020-07-17

## 2020-07-17 NOTE — TELEPHONE ENCOUNTER
Phone call from patient stating that [facility] filled her Tramadol partially 6/22/20 (60 tabs) and then gave her the remainder 120 at a later date  PDMP shows that the patient filled entire prescription 6/22/20  [facility] states patient didn't pick it up until 6/26/20 and will not allow her to  next prescription until 7/23/20  Patient states she will run out prior to that  I called [facility] and spoke to Arthur and was told they had no record of a partial fill  She said there was no way for us to override the prescription refill date  Patient informed  She states she has original bottles and receipt and will go to [facility] to hopefully resolve  If not, she will take less Tramadol daily to make last longer until 7/23/20  Sawyer Johnson(NP)

## 2020-08-19 ENCOUNTER — OFFICE VISIT (OUTPATIENT)
Dept: FAMILY MEDICINE CLINIC | Facility: CLINIC | Age: 40
End: 2020-08-19
Payer: COMMERCIAL

## 2020-08-19 VITALS
HEART RATE: 81 BPM | BODY MASS INDEX: 22.43 KG/M2 | OXYGEN SATURATION: 98 % | SYSTOLIC BLOOD PRESSURE: 116 MMHG | HEIGHT: 64 IN | TEMPERATURE: 97.5 F | DIASTOLIC BLOOD PRESSURE: 62 MMHG | RESPIRATION RATE: 17 BRPM | WEIGHT: 131.4 LBS

## 2020-08-19 DIAGNOSIS — M54.9 CHRONIC BACK PAIN, UNSPECIFIED BACK LOCATION, UNSPECIFIED BACK PAIN LATERALITY: ICD-10-CM

## 2020-08-19 DIAGNOSIS — G89.29 CHRONIC BACK PAIN, UNSPECIFIED BACK LOCATION, UNSPECIFIED BACK PAIN LATERALITY: ICD-10-CM

## 2020-08-19 DIAGNOSIS — R30.0 DYSURIA: Primary | ICD-10-CM

## 2020-08-19 LAB
SL AMB  POCT GLUCOSE, UA: ABNORMAL
SL AMB LEUKOCYTE ESTERASE,UA: ABNORMAL
SL AMB POCT BILIRUBIN,UA: ABNORMAL
SL AMB POCT BLOOD,UA: ABNORMAL
SL AMB POCT CLARITY,UA: CLEAR
SL AMB POCT COLOR,UA: YELLOW
SL AMB POCT KETONES,UA: ABNORMAL
SL AMB POCT NITRITE,UA: ABNORMAL
SL AMB POCT PH,UA: 5
SL AMB POCT SPECIFIC GRAVITY,UA: >=1.03
SL AMB POCT URINE PROTEIN: ABNORMAL
SL AMB POCT UROBILINOGEN: 0.2

## 2020-08-19 PROCEDURE — 81003 URINALYSIS AUTO W/O SCOPE: CPT | Performed by: FAMILY MEDICINE

## 2020-08-19 PROCEDURE — 3008F BODY MASS INDEX DOCD: CPT | Performed by: FAMILY MEDICINE

## 2020-08-19 PROCEDURE — 99213 OFFICE O/P EST LOW 20 MIN: CPT | Performed by: FAMILY MEDICINE

## 2020-08-19 PROCEDURE — 1036F TOBACCO NON-USER: CPT | Performed by: FAMILY MEDICINE

## 2020-08-19 RX ORDER — TRAMADOL HYDROCHLORIDE 50 MG/1
TABLET ORAL
Qty: 180 TABLET | Refills: 0 | Status: SHIPPED | OUTPATIENT
Start: 2020-08-19 | End: 2020-09-11 | Stop reason: SDUPTHER

## 2020-08-19 RX ORDER — NITROFURANTOIN 25; 75 MG/1; MG/1
100 CAPSULE ORAL 2 TIMES DAILY
Qty: 10 CAPSULE | Refills: 0 | Status: SHIPPED | OUTPATIENT
Start: 2020-08-19 | End: 2020-08-24

## 2020-08-19 NOTE — PROGRESS NOTES
Assessment/Plan:  1  Dysuria  Patient is 50-year-old female with urgency, burning often on for the past 2 weeks  Urine dip was positive for white blood cells and red blood cells  She is having her menses right now  - nitrofurantoin (MACROBID) 100 mg capsule; Take 1 capsule (100 mg total) by mouth 2 (two) times a day for 5 days  Dispense: 10 capsule; Refill: 0  - POCT urine dip auto non-scope  - Urine culture       Diagnoses and all orders for this visit:    Dysuria  -     nitrofurantoin (MACROBID) 100 mg capsule; Take 1 capsule (100 mg total) by mouth 2 (two) times a day for 5 days  -     POCT urine dip auto non-scope  -     Urine culture          Subjective:   No chief complaint on file  Patient ID: Reynaldo Mckeon is a 35 y o  female  Started with symptoms of burning and urgency on 8/3, drank cranberry juice and took AZO and seemd to improve, but now symptoms are back  Also menses started yesterday  Has not had a UTI in a long time      The following portions of the patient's history were reviewed and updated as appropriate: allergies, current medications, past family history, past medical history, past social history, past surgical history and problem list     Review of Systems   Constitutional: Negative for chills and fever  HENT: Negative  Respiratory: Negative  Gastrointestinal: Negative for diarrhea, nausea and vomiting  Genitourinary: Positive for difficulty urinating and dysuria  Negative for flank pain  Objective:      /62 (BP Location: Left arm, Patient Position: Sitting)   Pulse 81   Temp 97 5 °F (36 4 °C) (Tympanic)   Resp 17   Ht 5' 3 75" (1 619 m)   Wt 59 6 kg (131 lb 6 4 oz)   LMP 08/18/2020   SpO2 98%   BMI 22 73 kg/m²          Physical Exam  Vitals signs and nursing note reviewed  Constitutional:       Appearance: Normal appearance  HENT:      Head: Normocephalic  Cardiovascular:      Rate and Rhythm: Normal rate and regular rhythm     Pulmonary: Effort: Pulmonary effort is normal       Breath sounds: Normal breath sounds  Abdominal:      General: Bowel sounds are normal       Palpations: Abdomen is soft  Tenderness: There is abdominal tenderness (slight suprapubic tenderness)  There is no right CVA tenderness or left CVA tenderness  Lymphadenopathy:      Cervical: No cervical adenopathy  Skin:     General: Skin is warm and dry  Neurological:      Mental Status: She is alert and oriented to person, place, and time     Psychiatric:         Mood and Affect: Mood normal

## 2020-09-11 DIAGNOSIS — M54.9 CHRONIC BACK PAIN, UNSPECIFIED BACK LOCATION, UNSPECIFIED BACK PAIN LATERALITY: ICD-10-CM

## 2020-09-11 DIAGNOSIS — G89.29 CHRONIC BACK PAIN, UNSPECIFIED BACK LOCATION, UNSPECIFIED BACK PAIN LATERALITY: ICD-10-CM

## 2020-09-11 RX ORDER — TRAMADOL HYDROCHLORIDE 50 MG/1
TABLET ORAL
Qty: 180 TABLET | Refills: 0 | Status: SHIPPED | OUTPATIENT
Start: 2020-09-11 | End: 2020-10-12

## 2020-09-29 DIAGNOSIS — L01.00 IMPETIGO: ICD-10-CM

## 2020-10-12 DIAGNOSIS — G89.29 CHRONIC BACK PAIN, UNSPECIFIED BACK LOCATION, UNSPECIFIED BACK PAIN LATERALITY: ICD-10-CM

## 2020-10-12 DIAGNOSIS — M54.9 CHRONIC BACK PAIN, UNSPECIFIED BACK LOCATION, UNSPECIFIED BACK PAIN LATERALITY: ICD-10-CM

## 2020-10-12 DIAGNOSIS — G89.29 CHRONIC LOW BACK PAIN, UNSPECIFIED BACK PAIN LATERALITY, UNSPECIFIED WHETHER SCIATICA PRESENT: Primary | ICD-10-CM

## 2020-10-12 DIAGNOSIS — M54.50 CHRONIC LOW BACK PAIN, UNSPECIFIED BACK PAIN LATERALITY, UNSPECIFIED WHETHER SCIATICA PRESENT: Primary | ICD-10-CM

## 2020-10-12 RX ORDER — TRAMADOL HYDROCHLORIDE 50 MG/1
TABLET ORAL
Qty: 180 TABLET | Refills: 0 | Status: SHIPPED | OUTPATIENT
Start: 2020-10-12 | End: 2020-10-12 | Stop reason: SDUPTHER

## 2020-10-12 RX ORDER — TRAMADOL HYDROCHLORIDE 50 MG/1
TABLET ORAL
Qty: 180 TABLET | Refills: 0 | Status: SHIPPED | OUTPATIENT
Start: 2020-10-12 | End: 2020-11-05 | Stop reason: SDUPTHER

## 2020-11-05 DIAGNOSIS — M54.9 CHRONIC BACK PAIN, UNSPECIFIED BACK LOCATION, UNSPECIFIED BACK PAIN LATERALITY: ICD-10-CM

## 2020-11-05 DIAGNOSIS — G89.29 CHRONIC BACK PAIN, UNSPECIFIED BACK LOCATION, UNSPECIFIED BACK PAIN LATERALITY: ICD-10-CM

## 2020-11-05 RX ORDER — TRAMADOL HYDROCHLORIDE 50 MG/1
TABLET ORAL
Qty: 180 TABLET | Refills: 0 | Status: SHIPPED | OUTPATIENT
Start: 2020-11-05 | End: 2020-12-02 | Stop reason: SDUPTHER

## 2020-11-11 DIAGNOSIS — R10.9 FLANK PAIN: Primary | ICD-10-CM

## 2020-11-13 ENCOUNTER — APPOINTMENT (OUTPATIENT)
Dept: LAB | Facility: CLINIC | Age: 40
End: 2020-11-13
Payer: COMMERCIAL

## 2020-11-16 DIAGNOSIS — N39.0 URINARY TRACT INFECTION WITHOUT HEMATURIA, SITE UNSPECIFIED: Primary | ICD-10-CM

## 2020-11-16 RX ORDER — AMOXICILLIN 875 MG/1
875 TABLET, COATED ORAL 2 TIMES DAILY
Qty: 14 TABLET | Refills: 0 | Status: SHIPPED | OUTPATIENT
Start: 2020-11-16 | End: 2020-11-23

## 2020-12-02 DIAGNOSIS — G89.29 CHRONIC BACK PAIN, UNSPECIFIED BACK LOCATION, UNSPECIFIED BACK PAIN LATERALITY: ICD-10-CM

## 2020-12-02 DIAGNOSIS — M54.9 CHRONIC BACK PAIN, UNSPECIFIED BACK LOCATION, UNSPECIFIED BACK PAIN LATERALITY: ICD-10-CM

## 2020-12-02 RX ORDER — TRAMADOL HYDROCHLORIDE 50 MG/1
TABLET ORAL
Qty: 180 TABLET | Refills: 0 | Status: SHIPPED | OUTPATIENT
Start: 2020-12-02 | End: 2020-12-28 | Stop reason: SDUPTHER

## 2020-12-05 ENCOUNTER — OFFICE VISIT (OUTPATIENT)
Dept: FAMILY MEDICINE CLINIC | Facility: CLINIC | Age: 40
End: 2020-12-05
Payer: COMMERCIAL

## 2020-12-05 VITALS
DIASTOLIC BLOOD PRESSURE: 70 MMHG | HEIGHT: 63 IN | SYSTOLIC BLOOD PRESSURE: 94 MMHG | BODY MASS INDEX: 23.21 KG/M2 | OXYGEN SATURATION: 98 % | WEIGHT: 131 LBS | TEMPERATURE: 98 F | HEART RATE: 74 BPM

## 2020-12-05 DIAGNOSIS — M54.50 CHRONIC LOW BACK PAIN, UNSPECIFIED BACK PAIN LATERALITY, UNSPECIFIED WHETHER SCIATICA PRESENT: Primary | ICD-10-CM

## 2020-12-05 DIAGNOSIS — K59.09 CHRONIC CONSTIPATION: ICD-10-CM

## 2020-12-05 DIAGNOSIS — R10.9 FLANK PAIN: ICD-10-CM

## 2020-12-05 DIAGNOSIS — G89.29 CHRONIC LOW BACK PAIN, UNSPECIFIED BACK PAIN LATERALITY, UNSPECIFIED WHETHER SCIATICA PRESENT: Primary | ICD-10-CM

## 2020-12-05 LAB
SL AMB  POCT GLUCOSE, UA: NEGATIVE
SL AMB LEUKOCYTE ESTERASE,UA: NEGATIVE
SL AMB POCT BILIRUBIN,UA: NEGATIVE
SL AMB POCT BLOOD,UA: ABNORMAL
SL AMB POCT CLARITY,UA: ABNORMAL
SL AMB POCT COLOR,UA: YELLOW
SL AMB POCT KETONES,UA: NEGATIVE
SL AMB POCT NITRITE,UA: NEGATIVE
SL AMB POCT PH,UA: 5
SL AMB POCT SPECIFIC GRAVITY,UA: 1.02
SL AMB POCT URINE PROTEIN: NEGATIVE
SL AMB POCT UROBILINOGEN: 0.2

## 2020-12-05 PROCEDURE — 1036F TOBACCO NON-USER: CPT | Performed by: FAMILY MEDICINE

## 2020-12-05 PROCEDURE — 3008F BODY MASS INDEX DOCD: CPT | Performed by: FAMILY MEDICINE

## 2020-12-05 PROCEDURE — 81003 URINALYSIS AUTO W/O SCOPE: CPT | Performed by: FAMILY MEDICINE

## 2020-12-05 PROCEDURE — 99214 OFFICE O/P EST MOD 30 MIN: CPT | Performed by: FAMILY MEDICINE

## 2020-12-05 RX ORDER — METHOCARBAMOL 750 MG/1
TABLET, FILM COATED ORAL
Qty: 30 TABLET | Refills: 1 | Status: SHIPPED | OUTPATIENT
Start: 2020-12-05 | End: 2021-05-28

## 2020-12-13 DIAGNOSIS — R10.9 FLANK PAIN: Primary | ICD-10-CM

## 2020-12-14 ENCOUNTER — APPOINTMENT (OUTPATIENT)
Dept: RADIOLOGY | Facility: CLINIC | Age: 40
End: 2020-12-14
Payer: COMMERCIAL

## 2020-12-14 DIAGNOSIS — M47.816 LUMBAR SPONDYLOSIS: Primary | ICD-10-CM

## 2020-12-14 DIAGNOSIS — R10.9 FLANK PAIN: ICD-10-CM

## 2020-12-14 DIAGNOSIS — M47.816 LUMBAR SPONDYLOSIS: ICD-10-CM

## 2020-12-14 PROCEDURE — 72110 X-RAY EXAM L-2 SPINE 4/>VWS: CPT

## 2020-12-14 PROCEDURE — 72072 X-RAY EXAM THORAC SPINE 3VWS: CPT

## 2020-12-19 DIAGNOSIS — R10.9 FLANK PAIN: Primary | ICD-10-CM

## 2020-12-23 ENCOUNTER — HOSPITAL ENCOUNTER (OUTPATIENT)
Dept: ULTRASOUND IMAGING | Facility: MEDICAL CENTER | Age: 40
Discharge: HOME | End: 2020-12-23
Payer: COMMERCIAL

## 2020-12-23 DIAGNOSIS — R10.9 FLANK PAIN: ICD-10-CM

## 2020-12-23 PROCEDURE — 76770 US EXAM ABDO BACK WALL COMP: CPT

## 2020-12-28 ENCOUNTER — TELEPHONE (OUTPATIENT)
Dept: FAMILY MEDICINE CLINIC | Facility: CLINIC | Age: 40
End: 2020-12-28

## 2020-12-28 DIAGNOSIS — G89.29 CHRONIC BACK PAIN, UNSPECIFIED BACK LOCATION, UNSPECIFIED BACK PAIN LATERALITY: ICD-10-CM

## 2020-12-28 DIAGNOSIS — M54.9 CHRONIC BACK PAIN, UNSPECIFIED BACK LOCATION, UNSPECIFIED BACK PAIN LATERALITY: ICD-10-CM

## 2020-12-28 RX ORDER — TRAMADOL HYDROCHLORIDE 50 MG/1
TABLET ORAL
Qty: 180 TABLET | Refills: 0 | Status: SHIPPED | OUTPATIENT
Start: 2020-12-28 | End: 2021-01-26 | Stop reason: SDUPTHER

## 2020-12-29 ENCOUNTER — CLINICAL SUPPORT (OUTPATIENT)
Dept: FAMILY MEDICINE CLINIC | Facility: CLINIC | Age: 40
End: 2020-12-29

## 2020-12-29 DIAGNOSIS — G89.29 CHRONIC BACK PAIN, UNSPECIFIED BACK LOCATION, UNSPECIFIED BACK PAIN LATERALITY: Primary | ICD-10-CM

## 2020-12-29 DIAGNOSIS — M54.9 CHRONIC BACK PAIN, UNSPECIFIED BACK LOCATION, UNSPECIFIED BACK PAIN LATERALITY: Primary | ICD-10-CM

## 2021-01-04 DIAGNOSIS — F41.8 DEPRESSION WITH ANXIETY: ICD-10-CM

## 2021-01-04 DIAGNOSIS — M54.50 CHRONIC LOW BACK PAIN, UNSPECIFIED BACK PAIN LATERALITY, UNSPECIFIED WHETHER SCIATICA PRESENT: Primary | ICD-10-CM

## 2021-01-04 DIAGNOSIS — G89.29 CHRONIC LOW BACK PAIN, UNSPECIFIED BACK PAIN LATERALITY, UNSPECIFIED WHETHER SCIATICA PRESENT: Primary | ICD-10-CM

## 2021-01-04 RX ORDER — DULOXETIN HYDROCHLORIDE 30 MG/1
30 CAPSULE, DELAYED RELEASE ORAL DAILY
Qty: 90 CAPSULE | Refills: 1
Start: 2021-01-04 | End: 2022-08-01

## 2021-01-26 DIAGNOSIS — G89.29 CHRONIC BACK PAIN, UNSPECIFIED BACK LOCATION, UNSPECIFIED BACK PAIN LATERALITY: ICD-10-CM

## 2021-01-26 DIAGNOSIS — M54.9 CHRONIC BACK PAIN, UNSPECIFIED BACK LOCATION, UNSPECIFIED BACK PAIN LATERALITY: ICD-10-CM

## 2021-01-26 RX ORDER — TRAMADOL HYDROCHLORIDE 50 MG/1
TABLET ORAL
Qty: 180 TABLET | Refills: 0 | Status: SHIPPED | OUTPATIENT
Start: 2021-01-26 | End: 2021-02-19 | Stop reason: SDUPTHER

## 2021-02-19 DIAGNOSIS — M54.9 CHRONIC BACK PAIN, UNSPECIFIED BACK LOCATION, UNSPECIFIED BACK PAIN LATERALITY: ICD-10-CM

## 2021-02-19 DIAGNOSIS — G89.29 CHRONIC BACK PAIN, UNSPECIFIED BACK LOCATION, UNSPECIFIED BACK PAIN LATERALITY: ICD-10-CM

## 2021-02-19 RX ORDER — TRAMADOL HYDROCHLORIDE 50 MG/1
TABLET ORAL
Qty: 180 TABLET | Refills: 0 | Status: SHIPPED | OUTPATIENT
Start: 2021-02-19 | End: 2021-03-19 | Stop reason: SDUPTHER

## 2021-03-05 DIAGNOSIS — G89.29 CHRONIC LOW BACK PAIN, UNSPECIFIED BACK PAIN LATERALITY, UNSPECIFIED WHETHER SCIATICA PRESENT: ICD-10-CM

## 2021-03-05 DIAGNOSIS — M47.816 LUMBAR SPONDYLOSIS: ICD-10-CM

## 2021-03-05 DIAGNOSIS — M54.50 CHRONIC LOW BACK PAIN, UNSPECIFIED BACK PAIN LATERALITY, UNSPECIFIED WHETHER SCIATICA PRESENT: ICD-10-CM

## 2021-03-05 RX ORDER — NAPROXEN 500 MG/1
TABLET ORAL
Qty: 60 TABLET | Refills: 2 | Status: SHIPPED | OUTPATIENT
Start: 2021-03-05

## 2021-03-19 DIAGNOSIS — G89.29 CHRONIC BACK PAIN, UNSPECIFIED BACK LOCATION, UNSPECIFIED BACK PAIN LATERALITY: ICD-10-CM

## 2021-03-19 DIAGNOSIS — M54.9 CHRONIC BACK PAIN, UNSPECIFIED BACK LOCATION, UNSPECIFIED BACK PAIN LATERALITY: ICD-10-CM

## 2021-03-19 RX ORDER — TRAMADOL HYDROCHLORIDE 50 MG/1
TABLET ORAL
Qty: 180 TABLET | Refills: 0 | Status: SHIPPED | OUTPATIENT
Start: 2021-03-19 | End: 2021-04-12 | Stop reason: SDUPTHER

## 2021-03-19 NOTE — TELEPHONE ENCOUNTER
Pt requested rx refill by Dar  I sent message back notifying pt she is due for 3M f/u, and to call office to schedule  PDM checked Tramadol last filled 2/23/21 Q-180 via 30 day supply R-0

## 2021-04-12 ENCOUNTER — OFFICE VISIT (OUTPATIENT)
Dept: FAMILY MEDICINE CLINIC | Facility: CLINIC | Age: 41
End: 2021-04-12
Payer: COMMERCIAL

## 2021-04-12 VITALS
RESPIRATION RATE: 14 BRPM | SYSTOLIC BLOOD PRESSURE: 90 MMHG | OXYGEN SATURATION: 97 % | HEART RATE: 72 BPM | BODY MASS INDEX: 23.57 KG/M2 | DIASTOLIC BLOOD PRESSURE: 70 MMHG | HEIGHT: 63 IN | TEMPERATURE: 98.8 F | WEIGHT: 133 LBS

## 2021-04-12 DIAGNOSIS — K58.2 IRRITABLE BOWEL SYNDROME WITH BOTH CONSTIPATION AND DIARRHEA: ICD-10-CM

## 2021-04-12 DIAGNOSIS — C44.519 BASAL CELL CARCINOMA OF BACK: ICD-10-CM

## 2021-04-12 DIAGNOSIS — K64.9 HEMORRHOIDS, UNSPECIFIED HEMORRHOID TYPE: ICD-10-CM

## 2021-04-12 DIAGNOSIS — M54.50 CHRONIC LOW BACK PAIN, UNSPECIFIED BACK PAIN LATERALITY, UNSPECIFIED WHETHER SCIATICA PRESENT: Primary | ICD-10-CM

## 2021-04-12 DIAGNOSIS — G89.29 CHRONIC BACK PAIN, UNSPECIFIED BACK LOCATION, UNSPECIFIED BACK PAIN LATERALITY: ICD-10-CM

## 2021-04-12 DIAGNOSIS — K59.09 CHRONIC CONSTIPATION: ICD-10-CM

## 2021-04-12 DIAGNOSIS — M54.9 CHRONIC BACK PAIN, UNSPECIFIED BACK LOCATION, UNSPECIFIED BACK PAIN LATERALITY: ICD-10-CM

## 2021-04-12 DIAGNOSIS — Z12.31 ENCOUNTER FOR SCREENING MAMMOGRAM FOR MALIGNANT NEOPLASM OF BREAST: ICD-10-CM

## 2021-04-12 DIAGNOSIS — R10.9 FLANK PAIN: ICD-10-CM

## 2021-04-12 DIAGNOSIS — G89.29 CHRONIC LOW BACK PAIN, UNSPECIFIED BACK PAIN LATERALITY, UNSPECIFIED WHETHER SCIATICA PRESENT: Primary | ICD-10-CM

## 2021-04-12 PROCEDURE — 1036F TOBACCO NON-USER: CPT | Performed by: FAMILY MEDICINE

## 2021-04-12 PROCEDURE — 3008F BODY MASS INDEX DOCD: CPT | Performed by: FAMILY MEDICINE

## 2021-04-12 PROCEDURE — 99214 OFFICE O/P EST MOD 30 MIN: CPT | Performed by: FAMILY MEDICINE

## 2021-04-12 RX ORDER — TRAMADOL HYDROCHLORIDE 50 MG/1
TABLET ORAL
Qty: 180 TABLET | Refills: 0 | Status: SHIPPED | OUTPATIENT
Start: 2021-04-12 | End: 2021-05-10 | Stop reason: SDUPTHER

## 2021-04-12 NOTE — ASSESSMENT & PLAN NOTE
Patient with another recent flare up of right-sided flank pain recently  She has been treated on a number of occasions for this in the past   Recently she went to see her gynecologist to put her on doxycycline for 14 days  She did notice improvement with her symptoms

## 2021-04-12 NOTE — ASSESSMENT & PLAN NOTE
History of multiple basal cell carcinomas  Being followed by Dermatology (Dr Christina Eckert ) every 6 months

## 2021-04-12 NOTE — PROGRESS NOTES
50 [facility]      NAME: Moncho Casper  AGE: 38 y o  SEX: female  : 1980   MRN: [id number]    DATE: 2021  TIME: 5:23 PM    Assessment and Plan     Problem List Items Addressed This Visit     Chronic low back pain - Primary       Longstanding history of chronic low back pain  Patient has been evaluated by Pain Management and Neurosurgery in the past   She is status post rhizotomy  Currently stable on tramadol 6 tablets daily along with Lyrica 75 b i d  (no longer taking Cymbalta due to lack of efficacy)  As long as symptoms are stable, will continue to medically manage  If symptoms worsen, will refer back to pain management ([facility])  Basal cell carcinoma of back       History of multiple basal cell carcinomas  Being followed by Dermatology (Dr Jose Rodney ) every 6 months  Flank pain      Patient with another recent flare up of right-sided flank pain recently  She has been treated on a number of occasions for this in the past   Recently she went to see her gynecologist to put her on doxycycline for 14 days  She did notice improvement with her symptoms  Chronic constipation       Ongoing history of IBS/chronic constipation  Patient does get some relief from a Dulcolax gummy bears  She also has notice some improvement with dairy restrictions  Advised her to consider referral to GI for colonoscopy             Other Visit Diagnoses     Irritable bowel syndrome with both constipation and diarrhea        Chronic back pain, unspecified back location, unspecified back pain laterality        Relevant Medications    traMADol (ULTRAM) 50 mg tablet    Hemorrhoids, unspecified hemorrhoid type        Encounter for screening mammogram for malignant neoplasm of breast        Relevant Orders    Mammo screening bilateral w 3d & cad              Return to office in: 3 mos (  Will be coming in for yearly physical in near future)    Chief Complaint     Chief Complaint   Patient presents with    Back Pain       History of Present Illness     Patient presents for recheck chronic medical problems today  Overall still with chronic back pain, but stable on tramadol 6 tablets daily along with Lyrica 75 b i d  Still with occasional flank pain  Recently seen by gynecologist and started on doxycycline with benefit  Still with ongoing IBS symptoms  She did notice improvement would eliminate dairy  She does take Dulcolax get any bears daily which has helped her constipation  The following portions of the patient's history were reviewed and updated as appropriate: allergies, current medications, past family history, past medical history, past social history, past surgical history and problem list     Review of Systems   Review of Systems   Respiratory: Negative  Cardiovascular: Negative  Gastrointestinal: Positive for constipation  Genitourinary: Negative  Musculoskeletal: Positive for back pain  Active Problem List     Patient Active Problem List   Diagnosis    Chronic low back pain    Lumbar spondylosis    Depression with anxiety    Basal cell carcinoma of back    Insomnia    Lumbar pars defect    Cervical adenopathy    Cystitis    Impetigo    Flank pain    Chronic constipation       Objective   BP 90/70 (BP Location: Left arm, Patient Position: Sitting, Cuff Size: Adult)   Pulse 72   Temp 98 8 °F (37 1 °C) (Tympanic)   Resp 14   Ht 5' 2 99" (1 6 m)   Wt 60 3 kg (133 lb)   LMP 03/27/2021   SpO2 97%   BMI 23 57 kg/m²     Physical Exam  Cardiovascular:      Rate and Rhythm: Normal rate and regular rhythm  Heart sounds: Normal heart sounds  Comments: Carotids: no bruits  Ext: no edema  Pulmonary:      Effort: Pulmonary effort is normal  No respiratory distress  Breath sounds: No wheezing or rales  Psychiatric:         Behavior: Behavior normal          Thought Content:  Thought content normal          Pertinent Laboratory/Diagnostic Studies:  pt had recent labs    Current Medications     Current Outpatient Medications:     DULoxetine (CYMBALTA) 30 mg delayed release capsule, Take 1 capsule (30 mg total) by mouth daily, Disp: 90 capsule, Rfl: 1    methocarbamol (ROBAXIN) 750 mg tablet, 1 BID PRN, Disp: 30 tablet, Rfl: 1    mupirocin (BACTROBAN) 2 % ointment, Apply topically 3 (three) times a day, Disp: 22 g, Rfl: 0    naproxen (NAPROSYN) 500 mg tablet, TAKE 1 TABLET BY MOUTH UP TO TWICE A DAY AS NEEDED, Disp: 60 tablet, Rfl: 2    traMADol (ULTRAM) 50 mg tablet, 1-2 TABS BY MOUTH EVERY 6 HOURS AS NEEDED (MAX 6 TABS DAILY), Disp: 180 tablet, Rfl: 0    Health Maintenance     Health Maintenance   Topic Date Due    COVID-19 Vaccine (1) Never done    Annual Physical  Never done    Hepatitis B Vaccine (3 of 3 - 3-dose primary series) 07/22/1999    MAMMOGRAM  02/17/2021    Influenza Vaccine (1) 06/30/2021 (Originally 9/1/2020)    BMI: Adult  04/12/2022    Cervical Cancer Screening  03/14/2023    DTaP,Tdap,and Td Vaccines (2 - Td) 07/01/2025    HIV Screening  Addressed    Pneumococcal Vaccine: Pediatrics (0 to 5 Years) and At-Risk Patients (6 to 59 Years)  Aged Out    HIB Vaccine  Aged Out    IPV Vaccine  Aged Out    Hepatitis A Vaccine  Aged Out    Meningococcal ACWY Vaccine  Aged Out    HPV Vaccine  Aged [employer] History   Administered Date(s) Administered    Hep B, adult 12/17/1998, 05/27/1999    MMR 12/17/1998    Tdap 07/01/2015       Corona Faye DO  [facility]

## 2021-04-12 NOTE — ASSESSMENT & PLAN NOTE
Ongoing history of IBS/chronic constipation  Patient does get some relief from a Dulcolax gummy bears  She also has notice some improvement with dairy restrictions  Advised her to consider referral to GI for colonoscopy

## 2021-04-12 NOTE — ASSESSMENT & PLAN NOTE
Longstanding history of chronic low back pain  Patient has been evaluated by Pain Management and Neurosurgery in the past   She is status post rhizotomy  Currently stable on tramadol 6 tablets daily along with Lyrica 75 b i d  (no longer taking Cymbalta due to lack of efficacy)  As long as symptoms are stable, will continue to medically manage  If symptoms worsen, will refer back to pain management ([facility])

## 2021-04-19 DIAGNOSIS — K58.2 IRRITABLE BOWEL SYNDROME WITH BOTH CONSTIPATION AND DIARRHEA: Primary | ICD-10-CM

## 2021-05-06 ENCOUNTER — OFFICE VISIT (OUTPATIENT)
Dept: FAMILY MEDICINE CLINIC | Facility: CLINIC | Age: 41
End: 2021-05-06
Payer: COMMERCIAL

## 2021-05-06 VITALS
HEIGHT: 63 IN | WEIGHT: 129 LBS | TEMPERATURE: 97.4 F | RESPIRATION RATE: 14 BRPM | BODY MASS INDEX: 22.86 KG/M2 | SYSTOLIC BLOOD PRESSURE: 100 MMHG | DIASTOLIC BLOOD PRESSURE: 70 MMHG | OXYGEN SATURATION: 97 % | HEART RATE: 72 BPM

## 2021-05-06 DIAGNOSIS — Z00.00 ANNUAL PHYSICAL EXAM: Primary | ICD-10-CM

## 2021-05-06 DIAGNOSIS — Z13.220 SCREENING CHOLESTEROL LEVEL: ICD-10-CM

## 2021-05-06 DIAGNOSIS — Z13.29 SCREENING FOR THYROID DISORDER: ICD-10-CM

## 2021-05-06 DIAGNOSIS — E55.9 VITAMIN D DEFICIENCY: ICD-10-CM

## 2021-05-06 DIAGNOSIS — Z13.1 SCREENING FOR DIABETES MELLITUS: ICD-10-CM

## 2021-05-06 DIAGNOSIS — Z13.0 SCREENING FOR DEFICIENCY ANEMIA: ICD-10-CM

## 2021-05-06 PROCEDURE — 99396 PREV VISIT EST AGE 40-64: CPT | Performed by: FAMILY MEDICINE

## 2021-05-06 PROCEDURE — 1036F TOBACCO NON-USER: CPT | Performed by: FAMILY MEDICINE

## 2021-05-06 NOTE — PATIENT INSTRUCTIONS

## 2021-05-06 NOTE — PROGRESS NOTES
ADULT ANNUAL [address] [place] [address] MEDICAL GROUP    NAME: Perfecto Frances  AGE: 38 y o  SEX: female  : 1980     DATE: 2021     Assessment and Plan:     Problem List Items Addressed This Visit     Vitamin D deficiency    Relevant Orders    Vitamin D 25 hydroxy      Other Visit Diagnoses     Annual physical exam    -  Primary    Screening for deficiency anemia        Relevant Orders    CBC and differential    Screening for diabetes mellitus        Relevant Orders    Comprehensive metabolic panel    Hemoglobin A1C    Screening cholesterol level        Relevant Orders    Lipid Panel with Direct LDL reflex    Screening for thyroid disorder        Relevant Orders    TSH, 3rd generation with Free T4 reflex       Rx given for routine fasting blood work at [facility]  Will call with results     patient has mammogram scheduled   will be seeing GI in near future regarding colonoscopy    Immunizations and preventive care screenings were discussed with patient today  Appropriate education was printed on patient's after visit summary  Counseling:  · Exercise: the importance of regular exercise/physical activity was discussed  Recommend exercise 3-5 times per week for at least 30 minutes  No follow-ups on file  Chief Complaint:     Chief Complaint   Patient presents with    Physical Exam      History of Present Illness:     Adult Annual Physical   Patient here for a comprehensive physical exam  The patient reports no problems  Diet and Physical Activity  · Diet/Nutrition: well balanced diet  · Exercise: walking and moderate cardiovascular exercise  Depression Screening  PHQ-9 Depression Screening    PHQ-9:   Frequency of the following problems over the past two weeks:           General Health  · Sleep: sleeps well  · Hearing: normal - none   · Vision: None  · Dental: regular dental visits         /GYN Health  · Patient is: premenopausal  · Last menstrual period:   · Contraceptive method:  has vasectomy  Review of Systems:     Review of Systems   Constitutional: Negative  HENT: Negative  Respiratory: Negative  Cardiovascular: Negative  Gastrointestinal: Negative  Genitourinary: Negative  Musculoskeletal: Positive for back pain  Past Medical History:     Past Medical History:   Diagnosis Date    Anxiety     Last assessed: 10/11/13    Cancer ([facility] )     Skin -Basal Cell    Insomnia     Last assessed: 13    Sacroiliitis ([facility] )     Last assessed: 10/11/13    Urinary tract infection 2020    Vitamin D deficiency     Last assessed: 10/11/13      Past Surgical History:     Past Surgical History:   Procedure Laterality Date     SECTION      x2 Last assessed: 14    SKIN LESION EXCISION      Basal cell skin cancer excision at age 12    SPINE SURGERY      Radiofrequency ablation      Social History:        Social History     Socioeconomic History    Marital status: /Civil Union     Spouse name: None    Number of children: None    Years of education: None    Highest education level: None   Occupational History    Occupation: Employed   Social Needs    Financial resource strain: None    Food insecurity     Worry: None     Inability: None    Transportation needs     Medical: None     Non-medical: None   Tobacco Use    Smoking status: Former Smoker     Packs/day: 0 25     Years: 10 00     Pack years: 2 50     Quit date:      Years since quittin 3    Smokeless tobacco: Former User     Quit date: 2014    Tobacco comment: QUIT 4 YEARS AGO   Substance and Sexual Activity    Alcohol use:  Yes     Alcohol/week: 4 0 standard drinks     Types: 4 Glasses of wine per week    Drug use: No    Sexual activity: Yes     Partners: Male     Birth control/protection: Male Sterilization   Lifestyle    Physical activity     Days per week: None     Minutes per session: None  Stress: None   Relationships    Social connections     Talks on phone: None     Gets together: None     Attends Hindu service: None     Active member of club or organization: None     Attends meetings of clubs or organizations: None     Relationship status: None    Intimate partner violence     Fear of current or ex partner: None     Emotionally abused: None     Physically abused: None     Forced sexual activity: None   Other Topics Concern    None   Social History Narrative    Lives w/spouse    Lives w/children      Family History:     Family History   Problem Relation Age of Onset    Diabetes Family         Mellitus    Hypertension Family     Hypertension Mother     Diabetes Father     ALS Father     Lung cancer Maternal Grandfather 79    Alcohol abuse Maternal Grandfather     Ovarian cancer Paternal Grandmother 61    Cancer Paternal Grandmother         Ovarian    Melanoma Maternal Uncle         age unknown    No Known Problems Sister     No Known Problems Paternal Grandfather     No Known Problems Maternal Aunt     No Known Problems Maternal Aunt     No Known Problems Paternal Aunt     No Known Problems Maternal Grandmother       Current Medications:     Current Outpatient Medications   Medication Sig Dispense Refill    DULoxetine (CYMBALTA) 30 mg delayed release capsule Take 1 capsule (30 mg total) by mouth daily 90 capsule 1    methocarbamol (ROBAXIN) 750 mg tablet 1 BID PRN 30 tablet 1    mupirocin (BACTROBAN) 2 % ointment Apply topically 3 (three) times a day 22 g 0    naproxen (NAPROSYN) 500 mg tablet TAKE 1 TABLET BY MOUTH UP TO TWICE A DAY AS NEEDED 60 tablet 2    traMADol (ULTRAM) 50 mg tablet 1-2 TABS BY MOUTH EVERY 6 HOURS AS NEEDED (MAX 6 TABS DAILY) 180 tablet 0     No current facility-administered medications for this visit         Allergies:     No Known Allergies   Physical Exam:     /70 (BP Location: Left arm, Patient Position: Sitting, Cuff Size: Adult) Pulse 72   Temp (!) 97 4 °F (36 3 °C) (Tympanic)   Resp 14   Ht 5' 2 99" (1 6 m)   Wt 58 5 kg (129 lb)   LMP 04/29/2021   SpO2 97%   BMI 22 86 kg/m²     Physical Exam  Constitutional:       Appearance: She is well-developed  HENT:      Head: Normocephalic and atraumatic  Eyes:      Pupils: Pupils are equal, round, and reactive to light  Neck:      Musculoskeletal: Normal range of motion and neck supple  Cardiovascular:      Rate and Rhythm: Normal rate and regular rhythm  Heart sounds: Normal heart sounds  Pulmonary:      Effort: Pulmonary effort is normal       Breath sounds: Normal breath sounds  Abdominal:      General: Bowel sounds are normal       Palpations: Abdomen is soft  Skin:     General: Skin is warm and dry  Neurological:      Mental Status: She is alert and oriented to person, place, and time  Psychiatric:         Behavior: Behavior normal          Thought Content:  Thought content normal          Judgment: Judgment normal           Mya Gaines DO  [facility]

## 2021-05-10 ENCOUNTER — APPOINTMENT (OUTPATIENT)
Dept: LAB | Facility: CLINIC | Age: 41
End: 2021-05-10
Payer: COMMERCIAL

## 2021-05-10 DIAGNOSIS — Z13.0 SCREENING FOR DEFICIENCY ANEMIA: ICD-10-CM

## 2021-05-10 DIAGNOSIS — Z13.29 SCREENING FOR THYROID DISORDER: ICD-10-CM

## 2021-05-10 DIAGNOSIS — M54.9 CHRONIC BACK PAIN, UNSPECIFIED BACK LOCATION, UNSPECIFIED BACK PAIN LATERALITY: ICD-10-CM

## 2021-05-10 DIAGNOSIS — Z13.220 SCREENING CHOLESTEROL LEVEL: ICD-10-CM

## 2021-05-10 DIAGNOSIS — Z13.1 SCREENING FOR DIABETES MELLITUS: ICD-10-CM

## 2021-05-10 DIAGNOSIS — E55.9 VITAMIN D DEFICIENCY: ICD-10-CM

## 2021-05-10 DIAGNOSIS — G89.29 CHRONIC BACK PAIN, UNSPECIFIED BACK LOCATION, UNSPECIFIED BACK PAIN LATERALITY: ICD-10-CM

## 2021-05-10 LAB
25(OH)D3 SERPL-MCNC: 23.5 NG/ML (ref 30–100)
ALBUMIN SERPL BCP-MCNC: 3.6 G/DL (ref 3.5–5)
ALP SERPL-CCNC: 64 U/L (ref 46–116)
ALT SERPL W P-5'-P-CCNC: 16 U/L (ref 12–78)
ANION GAP SERPL CALCULATED.3IONS-SCNC: 7 MMOL/L (ref 4–13)
AST SERPL W P-5'-P-CCNC: 8 U/L (ref 5–45)
BASOPHILS # BLD AUTO: 0.04 THOUSANDS/ΜL (ref 0–0.1)
BASOPHILS NFR BLD AUTO: 1 % (ref 0–1)
BILIRUB SERPL-MCNC: 0.3 MG/DL (ref 0.2–1)
BUN SERPL-MCNC: 13 MG/DL (ref 5–25)
CALCIUM SERPL-MCNC: 8.6 MG/DL (ref 8.3–10.1)
CHLORIDE SERPL-SCNC: 105 MMOL/L (ref 100–108)
CHOLEST SERPL-MCNC: 193 MG/DL (ref 50–200)
CO2 SERPL-SCNC: 26 MMOL/L (ref 21–32)
CREAT SERPL-MCNC: 0.61 MG/DL (ref 0.6–1.3)
EOSINOPHIL # BLD AUTO: 0.09 THOUSAND/ΜL (ref 0–0.61)
EOSINOPHIL NFR BLD AUTO: 1 % (ref 0–6)
ERYTHROCYTE [DISTWIDTH] IN BLOOD BY AUTOMATED COUNT: 13.2 % (ref 11.6–15.1)
GFR SERPL CREATININE-BSD FRML MDRD: 113 ML/MIN/1.73SQ M
GLUCOSE P FAST SERPL-MCNC: 86 MG/DL (ref 65–99)
HCT VFR BLD AUTO: 41.4 % (ref 34.8–46.1)
HDLC SERPL-MCNC: 68 MG/DL
HGB BLD-MCNC: 13.1 G/DL (ref 11.5–15.4)
IMM GRANULOCYTES # BLD AUTO: 0.02 THOUSAND/UL (ref 0–0.2)
IMM GRANULOCYTES NFR BLD AUTO: 0 % (ref 0–2)
LDLC SERPL CALC-MCNC: 110 MG/DL (ref 0–100)
LYMPHOCYTES # BLD AUTO: 2.09 THOUSANDS/ΜL (ref 0.6–4.47)
LYMPHOCYTES NFR BLD AUTO: 33 % (ref 14–44)
MCH RBC QN AUTO: 29.5 PG (ref 26.8–34.3)
MCHC RBC AUTO-ENTMCNC: 31.6 G/DL (ref 31.4–37.4)
MCV RBC AUTO: 93 FL (ref 82–98)
MONOCYTES # BLD AUTO: 0.44 THOUSAND/ΜL (ref 0.17–1.22)
MONOCYTES NFR BLD AUTO: 7 % (ref 4–12)
NEUTROPHILS # BLD AUTO: 3.68 THOUSANDS/ΜL (ref 1.85–7.62)
NEUTS SEG NFR BLD AUTO: 58 % (ref 43–75)
NRBC BLD AUTO-RTO: 0 /100 WBCS
PLATELET # BLD AUTO: 308 THOUSANDS/UL (ref 149–390)
PMV BLD AUTO: 9.7 FL (ref 8.9–12.7)
POTASSIUM SERPL-SCNC: 3.7 MMOL/L (ref 3.5–5.3)
PROT SERPL-MCNC: 6.1 G/DL (ref 6.4–8.2)
RBC # BLD AUTO: 4.44 MILLION/UL (ref 3.81–5.12)
SODIUM SERPL-SCNC: 138 MMOL/L (ref 136–145)
TRIGL SERPL-MCNC: 73 MG/DL
TSH SERPL DL<=0.05 MIU/L-ACNC: 1.54 UIU/ML (ref 0.36–3.74)
WBC # BLD AUTO: 6.36 THOUSAND/UL (ref 4.31–10.16)

## 2021-05-10 PROCEDURE — 84443 ASSAY THYROID STIM HORMONE: CPT

## 2021-05-10 PROCEDURE — 83036 HEMOGLOBIN GLYCOSYLATED A1C: CPT

## 2021-05-10 PROCEDURE — 80053 COMPREHEN METABOLIC PANEL: CPT

## 2021-05-10 PROCEDURE — 36415 COLL VENOUS BLD VENIPUNCTURE: CPT

## 2021-05-10 PROCEDURE — 85025 COMPLETE CBC W/AUTO DIFF WBC: CPT

## 2021-05-10 PROCEDURE — 82306 VITAMIN D 25 HYDROXY: CPT

## 2021-05-10 PROCEDURE — 80061 LIPID PANEL: CPT

## 2021-05-10 RX ORDER — TRAMADOL HYDROCHLORIDE 50 MG/1
TABLET ORAL
Qty: 180 TABLET | Refills: 0 | Status: SHIPPED | OUTPATIENT
Start: 2021-05-10 | End: 2021-06-07 | Stop reason: SDUPTHER

## 2021-05-10 NOTE — TELEPHONE ENCOUNTER
----- Message from Belinda Braun sent at 5/9/2021 11:53 AM EDT -----  Regarding: Prescription Question  Contact: [phone]  Luis Hernandez,     This message is to request a refill for my Tramadol 50 mg  Please send my refill for my prescription to [facility] in [facility]   [address] [phone]       Thank you,   Belinda Braun   [phone]

## 2021-05-11 LAB
EST. AVERAGE GLUCOSE BLD GHB EST-MCNC: 111 MG/DL
HBA1C MFR BLD: 5.5 %

## 2021-05-19 ENCOUNTER — HOSPITAL ENCOUNTER (OUTPATIENT)
Dept: MAMMOGRAPHY | Facility: MEDICAL CENTER | Age: 41
Discharge: HOME | End: 2021-05-19
Payer: COMMERCIAL

## 2021-05-19 VITALS — HEIGHT: 62 IN | WEIGHT: 129 LBS | BODY MASS INDEX: 23.74 KG/M2

## 2021-05-19 DIAGNOSIS — Z12.31 ENCOUNTER FOR SCREENING MAMMOGRAM FOR MALIGNANT NEOPLASM OF BREAST: ICD-10-CM

## 2021-05-19 PROCEDURE — 77067 SCR MAMMO BI INCL CAD: CPT

## 2021-05-19 PROCEDURE — 77063 BREAST TOMOSYNTHESIS BI: CPT

## 2021-05-21 ENCOUNTER — CONSULT (OUTPATIENT)
Dept: GASTROENTEROLOGY | Facility: CLINIC | Age: 41
End: 2021-05-21
Payer: COMMERCIAL

## 2021-05-21 VITALS
SYSTOLIC BLOOD PRESSURE: 106 MMHG | HEART RATE: 77 BPM | TEMPERATURE: 98.6 F | HEIGHT: 63 IN | WEIGHT: 131 LBS | DIASTOLIC BLOOD PRESSURE: 70 MMHG | BODY MASS INDEX: 23.21 KG/M2

## 2021-05-21 DIAGNOSIS — K59.09 CHRONIC CONSTIPATION: ICD-10-CM

## 2021-05-21 DIAGNOSIS — K59.09 CHRONIC CONSTIPATION: Primary | ICD-10-CM

## 2021-05-21 DIAGNOSIS — K58.2 IRRITABLE BOWEL SYNDROME WITH BOTH CONSTIPATION AND DIARRHEA: ICD-10-CM

## 2021-05-21 PROCEDURE — 3008F BODY MASS INDEX DOCD: CPT | Performed by: FAMILY MEDICINE

## 2021-05-21 PROCEDURE — 99203 OFFICE O/P NEW LOW 30 MIN: CPT | Performed by: INTERNAL MEDICINE

## 2021-05-21 RX ORDER — SODIUM, POTASSIUM,MAG SULFATES 17.5-3.13G
177 SOLUTION, RECONSTITUTED, ORAL ORAL ONCE
Qty: 177 ML | Refills: 0 | Status: SHIPPED | OUTPATIENT
Start: 2021-05-21 | End: 2021-09-01 | Stop reason: HOSPADM

## 2021-05-26 ENCOUNTER — APPOINTMENT (OUTPATIENT)
Dept: LAB | Facility: CLINIC | Age: 41
End: 2021-05-26
Payer: COMMERCIAL

## 2021-05-26 DIAGNOSIS — K59.09 CHRONIC CONSTIPATION: ICD-10-CM

## 2021-05-26 PROCEDURE — 86255 FLUORESCENT ANTIBODY SCREEN: CPT

## 2021-05-26 PROCEDURE — 36415 COLL VENOUS BLD VENIPUNCTURE: CPT

## 2021-05-26 PROCEDURE — 83516 IMMUNOASSAY NONANTIBODY: CPT

## 2021-05-26 PROCEDURE — 82784 ASSAY IGA/IGD/IGG/IGM EACH: CPT

## 2021-05-26 RX ORDER — LINACLOTIDE 145 UG/1
CAPSULE, GELATIN COATED ORAL
Qty: 90 CAPSULE | Refills: 2 | Status: SHIPPED | OUTPATIENT
Start: 2021-05-26 | End: 2021-07-21

## 2021-05-28 DIAGNOSIS — M54.50 CHRONIC LOW BACK PAIN, UNSPECIFIED BACK PAIN LATERALITY, UNSPECIFIED WHETHER SCIATICA PRESENT: ICD-10-CM

## 2021-05-28 DIAGNOSIS — G89.29 CHRONIC LOW BACK PAIN, UNSPECIFIED BACK PAIN LATERALITY, UNSPECIFIED WHETHER SCIATICA PRESENT: ICD-10-CM

## 2021-05-28 DIAGNOSIS — R10.9 FLANK PAIN: ICD-10-CM

## 2021-05-28 LAB
ENDOMYSIUM IGA SER QL: NEGATIVE
GLIADIN PEPTIDE IGA SER-ACNC: 4 UNITS (ref 0–19)
GLIADIN PEPTIDE IGG SER-ACNC: 1 UNITS (ref 0–19)
IGA SERPL-MCNC: 94 MG/DL (ref 87–352)
TTG IGA SER-ACNC: <2 U/ML (ref 0–3)
TTG IGG SER-ACNC: <2 U/ML (ref 0–5)

## 2021-05-28 RX ORDER — METHOCARBAMOL 750 MG/1
TABLET, FILM COATED ORAL
Qty: 30 TABLET | Refills: 1 | Status: SHIPPED | OUTPATIENT
Start: 2021-05-28 | End: 2021-12-16 | Stop reason: SDUPTHER

## 2021-06-03 ENCOUNTER — TELEPHONE (OUTPATIENT)
Dept: GASTROENTEROLOGY | Facility: CLINIC | Age: 41
End: 2021-06-03

## 2021-06-03 NOTE — TELEPHONE ENCOUNTER
----- Message from Leonel Polk MD sent at 6/1/2021  7:53 AM EDT -----  Call patient to report normal results

## 2021-06-07 DIAGNOSIS — G89.29 CHRONIC BACK PAIN, UNSPECIFIED BACK LOCATION, UNSPECIFIED BACK PAIN LATERALITY: ICD-10-CM

## 2021-06-07 DIAGNOSIS — M54.9 CHRONIC BACK PAIN, UNSPECIFIED BACK LOCATION, UNSPECIFIED BACK PAIN LATERALITY: ICD-10-CM

## 2021-06-07 RX ORDER — TRAMADOL HYDROCHLORIDE 50 MG/1
TABLET ORAL
Qty: 180 TABLET | Refills: 0 | Status: SHIPPED | OUTPATIENT
Start: 2021-06-07 | End: 2021-07-06 | Stop reason: SDUPTHER

## 2021-06-07 NOTE — TELEPHONE ENCOUNTER
----- Message from Voodoo Taco Marsha sent at 6/6/2021  6:04 PM EDT -----  Regarding: Prescription Question  Contact: [phone]  Luis Mckeon,     This message is to request a refill for my Tramadol 50 mg  Please send my refill for my prescription to [facility] in [facility]   [address] [place] [address]   [phone]       Thank you,   Voodoo Taco Marsha   [phone]

## 2021-07-06 DIAGNOSIS — M54.9 CHRONIC BACK PAIN, UNSPECIFIED BACK LOCATION, UNSPECIFIED BACK PAIN LATERALITY: ICD-10-CM

## 2021-07-06 DIAGNOSIS — G89.29 CHRONIC BACK PAIN, UNSPECIFIED BACK LOCATION, UNSPECIFIED BACK PAIN LATERALITY: ICD-10-CM

## 2021-07-06 RX ORDER — TRAMADOL HYDROCHLORIDE 50 MG/1
TABLET ORAL
Qty: 180 TABLET | Refills: 0 | Status: SHIPPED | OUTPATIENT
Start: 2021-07-06 | End: 2021-08-03 | Stop reason: SDUPTHER

## 2021-07-06 NOTE — TELEPHONE ENCOUNTER
----- Message from Kamilla Thomason sent at 7/5/2021  6:36 PM EDT -----  Regarding: Prescription Question  Contact: [phone]  Luis Baker,     This message is to request a refill for my Tramadol 50 mg  Please send my refill for my prescription to [facility] in [facility]   [address] [address]   [phone]       Thank you,   Kamilla Thomason   [phone]

## 2021-07-12 ENCOUNTER — OFFICE VISIT (OUTPATIENT)
Dept: FAMILY MEDICINE CLINIC | Facility: CLINIC | Age: 41
End: 2021-07-12
Payer: COMMERCIAL

## 2021-07-12 VITALS
BODY MASS INDEX: 22.5 KG/M2 | WEIGHT: 127 LBS | DIASTOLIC BLOOD PRESSURE: 70 MMHG | RESPIRATION RATE: 14 BRPM | TEMPERATURE: 97.7 F | HEART RATE: 86 BPM | SYSTOLIC BLOOD PRESSURE: 110 MMHG | HEIGHT: 63 IN | OXYGEN SATURATION: 96 %

## 2021-07-12 DIAGNOSIS — K59.09 CHRONIC CONSTIPATION: ICD-10-CM

## 2021-07-12 DIAGNOSIS — E55.9 VITAMIN D DEFICIENCY: ICD-10-CM

## 2021-07-12 DIAGNOSIS — L01.00 IMPETIGO: ICD-10-CM

## 2021-07-12 DIAGNOSIS — M54.50 CHRONIC LOW BACK PAIN, UNSPECIFIED BACK PAIN LATERALITY, UNSPECIFIED WHETHER SCIATICA PRESENT: Primary | ICD-10-CM

## 2021-07-12 DIAGNOSIS — G89.29 CHRONIC LOW BACK PAIN, UNSPECIFIED BACK PAIN LATERALITY, UNSPECIFIED WHETHER SCIATICA PRESENT: Primary | ICD-10-CM

## 2021-07-12 DIAGNOSIS — C44.519 BASAL CELL CARCINOMA OF BACK: ICD-10-CM

## 2021-07-12 PROCEDURE — 99214 OFFICE O/P EST MOD 30 MIN: CPT | Performed by: FAMILY MEDICINE

## 2021-07-12 PROCEDURE — 1036F TOBACCO NON-USER: CPT | Performed by: FAMILY MEDICINE

## 2021-07-12 PROCEDURE — 3008F BODY MASS INDEX DOCD: CPT | Performed by: FAMILY MEDICINE

## 2021-07-12 NOTE — ASSESSMENT & PLAN NOTE
History of multiple recurrent basal cell carcinoma    Being followed by Dermatology on a regular basis, Dr Jadiel Rapp

## 2021-07-12 NOTE — PROGRESS NOTES
[facility]      NAME: Hans Thomas  AGE: 38 y o  SEX: female  : 1980   MRN: [id number]    DATE: 2021  TIME: 3:56 PM    Assessment and Plan     Problem List Items Addressed This Visit     Chronic low back pain - Primary      Longstanding history of chronic low back pain  Patient has been evaluated in the past by Pain Management and Neurosurgery  Status post rhizotomy  Pain is currently stable on tramadol 6 tablets daily along with Lyrica 75 b i d     As long as symptoms are stable, will continue to prescribed medication  If symptoms worsen, will refer back to pain management at [facility]         Basal cell carcinoma of back      History of multiple recurrent basal cell carcinoma  Being followed by Dermatology on a regular basis, Dr Francesca Logan         Relevant Medications    mupirocin (BACTROBAN) 2 % ointment    Impetigo      Refill given for Bactroban p r n  Relevant Medications    mupirocin (BACTROBAN) 2 % ointment    Chronic constipation      Ongoing history of chronic constipation/IBS  She was initially getting some relief with fiber gummies, but symptoms have returned  She has colonoscopy scheduled in September  Vitamin D deficiency      Vitamin-D level from May 10th was 23 5  Patient was currently taking 2000 International Units daily  I asked her to increase this by at least 1000 International Units daily  Return to office in: 3 mos, yearly labs 2022    Chief Complaint     Chief Complaint   Patient presents with    Follow-up     3 months       History of Present Illness      Patient presents for recheck chronic medical problems  Overall she is doing well  Pain is controlled on tramadol on Lyrica  Still sees dermatologist regularly for recurrent basal cell carcinoma  History of chronic constipation  Patient has colonoscopy scheduled for September    She had labs done on May 10th      The following portions of the patient's history were reviewed and updated as appropriate: allergies, current medications, past family history, past medical history, past social history, past surgical history and problem list     Review of Systems   Review of Systems   Respiratory: Negative  Cardiovascular: Negative  Gastrointestinal: Negative  Genitourinary: Negative  Active Problem List     Patient Active Problem List   Diagnosis    Chronic low back pain    Lumbar spondylosis    Depression with anxiety    Basal cell carcinoma of back    Insomnia    Lumbar pars defect    Cervical adenopathy    Cystitis    Impetigo    Flank pain    Chronic constipation    Irritable bowel syndrome with both constipation and diarrhea    Vitamin D deficiency       Objective   /70 (BP Location: Left arm, Patient Position: Sitting, Cuff Size: Adult)   Pulse 86   Temp 97 7 °F (36 5 °C) (Tympanic)   Resp 14   Ht 5' 2 99" (1 6 m)   Wt 57 6 kg (127 lb)   LMP 06/18/2021   SpO2 96%   BMI 22 50 kg/m²     Physical Exam  Cardiovascular:      Rate and Rhythm: Normal rate and regular rhythm  Heart sounds: Normal heart sounds  Comments: Carotids: no bruits  Ext: no edema  Pulmonary:      Effort: Pulmonary effort is normal  No respiratory distress  Breath sounds: No wheezing or rales  Psychiatric:         Behavior: Behavior normal          Thought Content:  Thought content normal          Pertinent Laboratory/Diagnostic Studies:  labs may 10    Current Medications     Current Outpatient Medications:     DULoxetine (CYMBALTA) 30 mg delayed release capsule, Take 1 capsule (30 mg total) by mouth daily, Disp: 90 capsule, Rfl: 1    Linzess 145 MCG CAPS, TAKE 1 CAPSULE BY MOUTH EVERY DAY, Disp: 90 capsule, Rfl: 2    methocarbamol (ROBAXIN) 750 mg tablet, TAKE 1 TABLET BY MOUTH TWICE A DAY AS NEEDED, Disp: 30 tablet, Rfl: 1    naproxen (NAPROSYN) 500 mg tablet, TAKE 1 TABLET BY MOUTH UP TO TWICE A DAY AS NEEDED, Disp: 60 tablet, Rfl: 2   traMADol (ULTRAM) 50 mg tablet, 1-2 TABS BY MOUTH EVERY 6 HOURS AS NEEDED (MAX 6 TABS DAILY), Disp: 180 tablet, Rfl: 0    mupirocin (BACTROBAN) 2 % ointment, Apply topically 3 (three) times a day, Disp: 22 g, Rfl: 5    Na Sulfate-K Sulfate-Mg Sulf (Suprep Bowel Prep Kit) 17 5-3 13-1 6 GM/177ML SOLN, Take 177 mL by mouth once for 1 dose, Disp: 177 mL, Rfl: 0    Health Maintenance     Health Maintenance   Topic Date Due    Hepatitis C Screening  Never done    COVID-19 Vaccine (1) Never done    Hepatitis B Vaccine (3 of 3 - 3-dose primary series) 07/22/1999    Influenza Vaccine (1) 09/01/2021    Annual Physical  05/06/2022    MAMMOGRAM  05/19/2022    BMI: Adult  05/21/2022    Cervical Cancer Screening  03/14/2023    DTaP,Tdap,and Td Vaccines (2 - Td or Tdap) 07/01/2025    HIV Screening  Addressed    Pneumococcal Vaccine: Pediatrics (0 to 5 Years) and At-Risk Patients (6 to 59 Years)  Aged Out    HIB Vaccine  Aged Out    IPV Vaccine  Aged Out    Hepatitis A Vaccine  Aged Out    Meningococcal ACWY Vaccine  Aged Out    HPV Vaccine  Aged [employer] History   Administered Date(s) Administered    Hep B, adult 12/17/1998, 05/27/1999    MMR 12/17/1998    Tdap 07/01/2015       Amanda Bautista, DO  [facility]

## 2021-07-12 NOTE — PATIENT INSTRUCTIONS
Colon scheduled on 9/1 at [place] with Dr Mike Baca gave pt verbal instructions/paper work given  Prep-Miralax/Dulcolax (2 day)

## 2021-07-12 NOTE — ASSESSMENT & PLAN NOTE
Longstanding history of chronic low back pain  Patient has been evaluated in the past by Pain Management and Neurosurgery  Status post rhizotomy  Pain is currently stable on tramadol 6 tablets daily along with Lyrica 75 b i d     As long as symptoms are stable, will continue to prescribed medication    If symptoms worsen, [facility]

## 2021-07-12 NOTE — ASSESSMENT & PLAN NOTE
Vitamin-D level from May 10th was 23 5  Patient was currently taking 2000 International Units daily  I asked her to increase this by at least 1000 International Units daily  no

## 2021-07-12 NOTE — ASSESSMENT & PLAN NOTE
Ongoing history of chronic constipation/IBS  She was initially getting some relief with fiber gummies, but symptoms have returned  She has colonoscopy scheduled in September

## 2021-07-17 ENCOUNTER — OFFICE VISIT (OUTPATIENT)
Dept: URGENT CARE | Facility: MEDICAL CENTER | Age: 41
End: 2021-07-17
Payer: COMMERCIAL

## 2021-07-17 ENCOUNTER — APPOINTMENT (OUTPATIENT)
Dept: RADIOLOGY | Facility: MEDICAL CENTER | Age: 41
End: 2021-07-17
Payer: COMMERCIAL

## 2021-07-17 VITALS
OXYGEN SATURATION: 99 % | BODY MASS INDEX: 23.04 KG/M2 | HEART RATE: 74 BPM | HEIGHT: 63 IN | SYSTOLIC BLOOD PRESSURE: 113 MMHG | TEMPERATURE: 98.5 F | WEIGHT: 130 LBS | DIASTOLIC BLOOD PRESSURE: 74 MMHG | RESPIRATION RATE: 20 BRPM

## 2021-07-17 DIAGNOSIS — R10.9 ABDOMINAL PAIN, UNSPECIFIED ABDOMINAL LOCATION: Primary | ICD-10-CM

## 2021-07-17 DIAGNOSIS — R10.9 ABDOMINAL PAIN, UNSPECIFIED ABDOMINAL LOCATION: ICD-10-CM

## 2021-07-17 PROCEDURE — 74022 RADEX COMPL AQT ABD SERIES: CPT

## 2021-07-17 PROCEDURE — G0383 LEV 4 HOSP TYPE B ED VISIT: HCPCS | Performed by: FAMILY MEDICINE

## 2021-07-17 RX ORDER — SODIUM FLUORIDE 6 MG/ML
PASTE, DENTIFRICE DENTAL
COMMUNITY
Start: 2021-07-09

## 2021-07-17 NOTE — PATIENT INSTRUCTIONS
Abdominal x-ray obstruction series reveals no evidence of obstruction her  History and physical exam suggest musculoskeletal strain  Advised patient to avoid heavy lifting or straining  Apply warm compresses needed  Recommended ibuprofen as needed if pain persists with if pain worsens, she is to go immediately to the emergency room  Abdominal Pain   WHAT YOU NEED TO KNOW:   Abdominal pain can be dull, achy, or sharp  You may have pain in one area of your abdomen, or in your entire abdomen  Your pain may be caused by a condition such as constipation, food sensitivity or poisoning, infection, or a blockage  Abdominal pain can also be from a hernia, appendicitis, or an ulcer  Liver, gallbladder, or kidney conditions can also cause abdominal pain  The cause of your abdominal pain may be unknown  DISCHARGE INSTRUCTIONS:   Return to the emergency department if:   · You have new chest pain or shortness of breath  · You have pulsing pain in your upper abdomen or lower back that suddenly becomes constant  · Your pain is in the right lower abdominal area and worsens with movement  · You have a fever over 100 4°F (38°C) or shaking chills  · You are vomiting and cannot keep food or liquids down  · Your pain does not improve or gets worse over the next 8 to 12 hours  · You see blood in your vomit or bowel movements, or they look black and tarry  · Your skin or the whites of your eyes turn yellow  · You are a woman and have a large amount of vaginal bleeding that is not your monthly period  Contact your healthcare provider if:   · You have pain in your lower back  · You are a man and have pain in your testicles  · You have pain when you urinate  · You have questions or concerns about your condition or care  Follow up with your healthcare provider within 24 hours or as directed:  Write down your questions so you remember to ask them during your visits     Medicines: · Medicines  may be given to calm your stomach and prevent vomiting or to decrease pain  Ask how to take pain medicine safely  · Take your medicine as directed  Contact your healthcare provider if you think your medicine is not helping or if you have side effects  Tell him of her if you are allergic to any medicine  Keep a list of the medicines, vitamins, and herbs you take  Include the amounts, and when and why you take them  Bring the list or the pill bottles to follow-up visits  Carry your medicine list with you in case of an emergency  © Copyright [address] Information is for End User's use only and may not be sold, redistributed or otherwise used for commercial purposes  All illustrations and images included in CareNotes® are the copyrighted property of [employer]  or [facility] Perla Lutz  The above information is an  only  It is not intended as medical advice for individual conditions or treatments  Talk to your doctor, nurse or pharmacist before following any medical regimen to see if it is safe and effective for you

## 2021-07-17 NOTE — PROGRESS NOTES
[facility] [facility]        NAME: Jarek Snyder is a 38 y o  female  : 1980    MRN: [id number]  DATE: 2021  TIME: 1:42 PM    Assessment and Plan   Abdominal pain, unspecified abdominal location [R10 9]  1  Abdominal pain, unspecified abdominal location  XR abdomen obstruction series         Patient Instructions       Follow up with PCP in 3-5 days  Proceed to  ER if symptoms worsen  Chief Complaint     Chief Complaint   Patient presents with    Abdominal Pain     Patient states when she squats down and bends over she gets sharp pain to the L of her umbilicus   It is dull when she doesn'tbends  History of Present Illness        41-year-old female here today with abdominal pain for the last 3-4 days  Pain seems to be intermittent  Seems to be worse when she squats or bends forward  Pain at rest is 5/10 but increases to 10/10 with she bends forward  She has a known history IBS and chronic constipation however it is currently treated with Linzess  Denies fever  However, patient complaining of early satiety  Past surgical history significant for hernia repair found surgical for       Review of Systems   Review of Systems   Constitutional: Negative  Respiratory: Negative  Cardiovascular: Negative            Current Medications       Current Outpatient Medications:     DULoxetine (CYMBALTA) 30 mg delayed release capsule, Take 1 capsule (30 mg total) by mouth daily, Disp: 90 capsule, Rfl: 1    methocarbamol (ROBAXIN) 750 mg tablet, TAKE 1 TABLET BY MOUTH TWICE A DAY AS NEEDED, Disp: 30 tablet, Rfl: 1    mupirocin (BACTROBAN) 2 % ointment, Apply topically 3 (three) times a day, Disp: 22 g, Rfl: 5    naproxen (NAPROSYN) 500 mg tablet, TAKE 1 TABLET BY MOUTH UP TO TWICE A DAY AS NEEDED, Disp: 60 tablet, Rfl: 2    traMADol (ULTRAM) 50 mg tablet, 1-2 TABS BY MOUTH EVERY 6 HOURS AS NEEDED (MAX 6 TABS DAILY), Disp: 180 tablet, Rfl: 0    Linzess 145 MCG CAPS, TAKE 1 CAPSULE BY MOUTH EVERY DAY (Patient not taking: Reported on 2021), Disp: 90 capsule, Rfl: 2    Na Sulfate-K Sulfate-Mg Sulf (Suprep Bowel Prep Kit) 17 5-3 13-1 6 GM/177ML SOLN, Take 177 mL by mouth once for 1 dose, Disp: 177 mL, Rfl: 0    Sodium Fluoride 5000 PPM 1 1 % PSTE, BRUSH 2 TIMES A DAY FOR 2 MINUTES WITHOUT RINSING, Disp: , Rfl:     Current Allergies     Allergies as of 2021    (No Known Allergies)            The following portions of the patient's history were reviewed and updated as appropriate: allergies, current medications, past family history, past medical history, past social history, past surgical history and problem list      Past Medical History:   Diagnosis Date    Anxiety     Last assessed: 10/11/13    Basal cell carcinoma     on going    Cancer [facility]     Skin -Basal Cell    Constipation     Insomnia     Last assessed: 13    Sacroiliitis ([facility] )     Last assessed: 10/11/13    Urinary tract infection 2020    Vitamin D deficiency     Last assessed: 10/11/13       Past Surgical History:   Procedure Laterality Date     SECTION      x2 Last assessed: 14    SKIN LESION EXCISION      Basal cell skin cancer excision at age 12   [facility] SPINE SURGERY      Radiofrequency ablation       Family History   Problem Relation Age of Onset    Diabetes Family         Mellitus    Hypertension Family     Hypertension Mother     Diabetes Father     ALS Father     Lung cancer Maternal Grandfather 79    Alcohol abuse Maternal Grandfather     Ovarian cancer Paternal Grandmother 61    Cancer Paternal Grandmother         Ovarian    Melanoma Maternal Uncle         age unknown    No Known Problems Sister     No Known Problems Paternal Grandfather     No Known Problems Maternal Aunt     No Known Problems Maternal Aunt     No Known Problems Paternal Aunt     No Known Problems Maternal Grandmother          Medications have been verified          Objective   /74 Pulse 74   Temp 98 5 °F (36 9 °C)   Resp 20   Ht 5' 3" (1 6 m)   Wt 59 kg (130 lb)   LMP 06/07/2021   SpO2 99%   BMI 23 03 kg/m²   Patient's last menstrual period was 06/07/2021  Physical Exam     Physical Exam  Constitutional:       Appearance: She is well-developed  Cardiovascular:      Rate and Rhythm: Normal rate and regular rhythm  Comments: Upper lower extremity pulses 2+ bilaterally  Abdominal:      General: Abdomen is flat  Bowel sounds are normal  There is no distension or abdominal bruit  Hernia: No hernia is present  Comments: Strong pulsatile mass left infraumbilical area  Neurological:      Mental Status: She is alert

## 2021-07-23 ENCOUNTER — HOSPITAL ENCOUNTER (OUTPATIENT)
Dept: ULTRASOUND IMAGING | Facility: HOSPITAL | Age: 41
Discharge: HOME | End: 2021-07-23
Payer: COMMERCIAL

## 2021-07-23 DIAGNOSIS — R10.84 GENERALIZED ABDOMINAL PAIN: ICD-10-CM

## 2021-07-23 PROCEDURE — 76700 US EXAM ABDOM COMPLETE: CPT

## 2021-08-03 DIAGNOSIS — G89.29 CHRONIC BACK PAIN, UNSPECIFIED BACK LOCATION, UNSPECIFIED BACK PAIN LATERALITY: ICD-10-CM

## 2021-08-03 DIAGNOSIS — M54.9 CHRONIC BACK PAIN, UNSPECIFIED BACK LOCATION, UNSPECIFIED BACK PAIN LATERALITY: ICD-10-CM

## 2021-08-03 RX ORDER — TRAMADOL HYDROCHLORIDE 50 MG/1
TABLET ORAL
Qty: 180 TABLET | Refills: 0 | Status: SHIPPED | OUTPATIENT
Start: 2021-08-03 | End: 2021-09-01 | Stop reason: SDUPTHER

## 2021-08-03 NOTE — TELEPHONE ENCOUNTER
----- Message from Lulu Urrutia sent at 8/3/2021  9:35 AM EDT -----  Regarding: Prescription Question  Contact: [phone]  Good morning,     This message is to request a refill for my Tramadol 50 mg  Please send my refill for my prescription to [facility] in [facility]   [address] [address]   [phone]       Thank you,   Lulu Urrutia   [phone]

## 2021-08-13 NOTE — PROGRESS NOTES
[facility] - Outpatient Consultation  Ailyn Bess 38 y o  female MRN: [id number]  Encounter: [id number]          ASSESSMENT AND PLAN:      1  Irritable bowel syndrome with both constipation and diarrhea    - Colonoscopy; Future    2  Chronic constipation  - Celiac Disease Antibody Profile; Future  - Na Sulfate-K Sulfate-Mg Sulf (Suprep Bowel Prep Kit) 17 5-3 13-1 6 GM/177ML SOLN; Take 177 mL by mouth once for 1 dose  Dispense: 177 mL; Refill: 0  - Colonoscopy; Future  -optimize fluid and fiber  -can consider stool softeners if symptoms worsen  -constipation may also be related to medication as she has taken tramadol for back pain    ______________________________________________________________________    HPI:      Patient is a 43-year-old female with history of hemorrhoidal like bleeding, IBS mix  Denies first-degree family history of colorectal cancer  She has had constipation most consistent [place] stool type 1-2  She has difficulty with evacuation and defecation  She also has straining associated with bowel movement  She is also taking tramadol for back pain as needed  She is concerned her symptoms have worsened as a result presents here for evaluation  TSH, CBC, LFTs have been within normal limits  REVIEW OF SYSTEMS:    CONSTITUTIONAL: Denies any fever, chills, rigors, and weight loss  HEENT: No earache or tinnitus  Denies hearing loss or visual disturbances  CARDIOVASCULAR: No chest pain or palpitations  RESPIRATORY: Denies any cough, hemoptysis, shortness of breath or dyspnea on exertion  GASTROINTESTINAL: As noted in the History of Present Illness  GENITOURINARY: No problems with urination  Denies any hematuria or dysuria  NEUROLOGIC: No dizziness or vertigo, denies headaches  MUSCULOSKELETAL: Denies any muscle or joint pain  SKIN: Denies skin rashes or itching  ENDOCRINE: Denies excessive thirst  Denies intolerance to heat or cold    PSYCHOSOCIAL: Denies depression or anxiety  Denies any recent memory loss         Historical Information   Past Medical History:   Diagnosis Date    Anxiety     Last assessed: 10/11/13    Basal cell carcinoma     on going    Cancer ([facility] )     Skin -Basal Cell    Constipation     Insomnia     Last assessed: 13    Sacroiliitis ([facility] )     Last assessed: 10/11/13    Urinary tract infection 2020    Vitamin D deficiency     Last assessed: 10/11/13     Past Surgical History:   Procedure Laterality Date     SECTION      x2 Last assessed: 14    SKIN LESION EXCISION      Basal cell skin cancer excision at age 12   [facility]      Radiofrequency ablation     Social History   Social History     Substance and Sexual Activity   Alcohol Use Yes    Alcohol/week: 4 0 standard drinks    Types: 4 Glasses of wine per week     Social History     Substance and Sexual Activity   Drug Use No     Social History     Tobacco Use   Smoking Status Former Smoker    Packs/day: 0 25    Years: 10 00    Pack years: 2 50    Quit date:     Years since quittin 6   Smokeless Tobacco Former User    Quit date: 2014   Tobacco Comment    QUIT 4 YEARS AGO     Family History   Problem Relation Age of Onset    Diabetes Family         Mellitus    Hypertension Family     Hypertension Mother     Diabetes Father     ALS Father     Lung cancer Maternal Grandfather 79    Alcohol abuse Maternal Grandfather     Ovarian cancer Paternal Grandmother 61    Cancer Paternal Grandmother         Ovarian    Melanoma Maternal Uncle         age unknown    No Known Problems Sister     No Known Problems Paternal Grandfather     No Known Problems Maternal Aunt     No Known Problems Maternal Aunt     No Known Problems Paternal Aunt     No Known Problems Maternal Grandmother        Meds/Allergies       Current Outpatient Medications:     DULoxetine (CYMBALTA) 30 mg delayed release capsule    naproxen (NAPROSYN) 500 mg tablet    methocarbamol (ROBAXIN) 750 mg tablet    mupirocin (BACTROBAN) 2 % ointment    Na Sulfate-K Sulfate-Mg Sulf (Suprep Bowel Prep Kit) 17 5-3 13-1 6 GM/177ML SOLN    Sodium Fluoride 5000 PPM 1 1 % PSTE    traMADol (ULTRAM) 50 mg tablet    No Known Allergies        Objective     Blood pressure 106/70, pulse 77, temperature 98 6 °F (37 °C), temperature source Tympanic, height 5' 3" (1 6 m), weight 59 4 kg (131 lb), last menstrual period 04/29/2021, not currently breastfeeding  Body mass index is 23 21 kg/m²  PHYSICAL EXAM:      General Appearance:   Alert, cooperative, no distress   HEENT:   Normocephalic, atraumatic, anicteric      Neck:  Supple, symmetrical, trachea midline   Lungs:   Clear to auscultation bilaterally; no rales, rhonchi or wheezing; respirations unlabored    Heart[de-identified]   Regular rate and rhythm; no murmur, rub, or gallop  Abdomen:   Soft, non-tender, non-distended; normal bowel sounds; no masses, no organomegaly    Genitalia:   Deferred    Rectal:   Deferred    Extremities:  No cyanosis, clubbing or edema    Pulses:  2+ and symmetric    Skin:  No jaundice, rashes, or lesions    Lymph nodes:  No palpable cervical lymphadenopathy        Lab Results:   No visits with results within 1 Day(s) from this visit     Latest known visit with results is:   Appointment on 05/10/2021   Component Date Value    WBC 05/10/2021 6 36     RBC 05/10/2021 4 44     Hemoglobin 05/10/2021 13 1     Hematocrit 05/10/2021 41 4     MCV 05/10/2021 93     MCH 05/10/2021 29 5     MCHC 05/10/2021 31 6     RDW 05/10/2021 13 2     MPV 05/10/2021 9 7     Platelets 82/37/4705 308     nRBC 05/10/2021 0     Neutrophils Relative 05/10/2021 58     Immat GRANS % 05/10/2021 0     Lymphocytes Relative 05/10/2021 33     Monocytes Relative 05/10/2021 7     Eosinophils Relative 05/10/2021 1     Basophils Relative 05/10/2021 1     Neutrophils Absolute 05/10/2021 3 68     Immature Grans Absolute 05/10/2021 0 02  Lymphocytes Absolute 05/10/2021 2 09     Monocytes Absolute 05/10/2021 0 44     Eosinophils Absolute 05/10/2021 0 09     Basophils Absolute 05/10/2021 0 04     Sodium 05/10/2021 138     Potassium 05/10/2021 3 7     Chloride 05/10/2021 105     CO2 05/10/2021 26     ANION GAP 05/10/2021 7     BUN 05/10/2021 13     Creatinine 05/10/2021 0 61     Glucose, Fasting 05/10/2021 86     Calcium 05/10/2021 8 6     AST 05/10/2021 8     ALT 05/10/2021 16     Alkaline Phosphatase 05/10/2021 64     Total Protein 05/10/2021 6 1*    Albumin 05/10/2021 3 6     Total Bilirubin 05/10/2021 0 30     eGFR 05/10/2021 113     Hemoglobin A1C 05/10/2021 5 5     EAG 05/10/2021 111     Cholesterol 05/10/2021 193     Triglycerides 05/10/2021 73     HDL, Direct 05/10/2021 68     LDL Calculated 05/10/2021 110*    TSH 3RD GENERATON 05/10/2021 1 540     Vit D, 25-Hydroxy 05/10/2021 23 5*         Radiology Results:   XR abdomen obstruction series    Result Date: 7/18/2021  Narrative: OBSTRUCTION SERIES INDICATION:   R10 9: Unspecified abdominal pain  COMPARISON:  2/12/2020 EXAM PERFORMED/VIEWS:  XR ABDOMEN OBSTRUCTION SERIES FINDINGS: There is a nonobstructive bowel gas pattern  No free air beneath the hemidiaphragms  No pathologic calcifications or soft tissue masses evident  Osseous structures are unremarkable  Examination of the chest reveals a normal cardiomediastinal silhouette  Lungs are clear  Impression: Nonobstructive bowel gas pattern  Workstation performed: [id number]     US abdomen complete    Result Date: 7/23/2021  Narrative: ABDOMEN ULTRASOUND, COMPLETE INDICATION:   R10 84: Generalized abdominal pain  COMPARISON: 2/25/2016 TECHNIQUE:   Real-time ultrasound of the abdomen was performed with a curvilinear transducer with both volumetric sweeps and still imaging techniques  FINDINGS: PANCREAS:  Visualized portions of the pancreas are within normal limits   AORTA AND IVC:  Visualized portions are normal for patient age  LIVER: Size:  Within normal range  The liver measures 13 0 cm in the midclavicular line  Contour:  Surface contour is smooth  Parenchyma:  Echogenicity and echotexture are within normal limits  No evidence of suspicious mass  Limited imaging of the main portal vein shows it to be patent and hepatopetal  BILIARY: Trace gallbladder debris No intrahepatic biliary dilatation  CBD measures 4 mm  No choledocholithiasis  Negative Dominguez's sign KIDNEY: Right kidney measures 10 4 x 5 3 cm  Within normal limits  Left kidney measures 10 0 x 4 7 cm  Within normal limits  SPLEEN: Measures 10 3 x 10 4 x 5 8 cm  Within normal limits  ASCITES:  None       Impression: Trace gallbladder debris Otherwise normal Workstation performed: [id number]     Answers for HPI/ROS submitted by the patient on 5/17/2021  Chronicity: recurrent  Onset: more than 1 month ago  Onset quality: gradual  Frequency: daily  Episode duration: 4 hours  Progression since onset: gradually worsening  Pain location: periumbilical region, left flank, right flank  Pain - numeric: 6/10  Pain quality: aching, dull  Radiates to: left flank, right flank  anorexia: No  arthralgias: No  belching: Yes  constipation: Yes  flatus: Yes  headaches: Yes  Aggravated by: drinking alcohol, eating  Relieved by: bowel movements, certain positions, passing flatus

## 2021-08-17 ENCOUNTER — TELEPHONE (OUTPATIENT)
Dept: GASTROENTEROLOGY | Facility: MEDICAL CENTER | Age: 41
End: 2021-08-17

## 2021-08-31 ENCOUNTER — TELEPHONE (OUTPATIENT)
Dept: GASTROENTEROLOGY | Facility: MEDICAL CENTER | Age: 41
End: 2021-08-31

## 2021-09-01 ENCOUNTER — ANESTHESIA (OUTPATIENT)
Dept: GASTROENTEROLOGY | Facility: MEDICAL CENTER | Age: 41
End: 2021-09-01

## 2021-09-01 ENCOUNTER — HOSPITAL ENCOUNTER (OUTPATIENT)
Dept: GASTROENTEROLOGY | Facility: MEDICAL CENTER | Age: 41
Setting detail: OUTPATIENT SURGERY
Discharge: HOME | End: 2021-09-01
Attending: INTERNAL MEDICINE | Admitting: INTERNAL MEDICINE
Payer: COMMERCIAL

## 2021-09-01 ENCOUNTER — ANESTHESIA EVENT (OUTPATIENT)
Dept: GASTROENTEROLOGY | Facility: MEDICAL CENTER | Age: 41
End: 2021-09-01

## 2021-09-01 ENCOUNTER — PATIENT MESSAGE (OUTPATIENT)
Dept: FAMILY MEDICINE CLINIC | Facility: CLINIC | Age: 41
End: 2021-09-01

## 2021-09-01 VITALS
HEART RATE: 72 BPM | RESPIRATION RATE: 20 BRPM | OXYGEN SATURATION: 100 % | DIASTOLIC BLOOD PRESSURE: 62 MMHG | TEMPERATURE: 98.3 F | SYSTOLIC BLOOD PRESSURE: 100 MMHG

## 2021-09-01 DIAGNOSIS — K59.09 CHRONIC CONSTIPATION: ICD-10-CM

## 2021-09-01 DIAGNOSIS — M54.9 CHRONIC BACK PAIN, UNSPECIFIED BACK LOCATION, UNSPECIFIED BACK PAIN LATERALITY: ICD-10-CM

## 2021-09-01 DIAGNOSIS — G89.29 CHRONIC BACK PAIN, UNSPECIFIED BACK LOCATION, UNSPECIFIED BACK PAIN LATERALITY: ICD-10-CM

## 2021-09-01 DIAGNOSIS — K58.2 IRRITABLE BOWEL SYNDROME WITH BOTH CONSTIPATION AND DIARRHEA: ICD-10-CM

## 2021-09-01 LAB
EXT PREGNANCY TEST URINE: NEGATIVE
EXT. CONTROL: NORMAL

## 2021-09-01 PROCEDURE — 81025 URINE PREGNANCY TEST: CPT | Performed by: ANESTHESIOLOGY

## 2021-09-01 PROCEDURE — 45385 COLONOSCOPY W/LESION REMOVAL: CPT | Performed by: INTERNAL MEDICINE

## 2021-09-01 PROCEDURE — 88305 TISSUE EXAM BY PATHOLOGIST: CPT | Performed by: PATHOLOGY

## 2021-09-01 RX ORDER — SODIUM CHLORIDE 9 MG/ML
125 INJECTION, SOLUTION INTRAVENOUS CONTINUOUS
Status: DISCONTINUED | OUTPATIENT
Start: 2021-09-01 | End: 2021-09-05 | Stop reason: HOSPADM

## 2021-09-01 RX ORDER — PROPOFOL 10 MG/ML
INJECTION, EMULSION INTRAVENOUS AS NEEDED
Status: DISCONTINUED | OUTPATIENT
Start: 2021-09-01 | End: 2021-09-01

## 2021-09-01 RX ORDER — LIDOCAINE HYDROCHLORIDE 20 MG/ML
INJECTION, SOLUTION EPIDURAL; INFILTRATION; INTRACAUDAL; PERINEURAL AS NEEDED
Status: DISCONTINUED | OUTPATIENT
Start: 2021-09-01 | End: 2021-09-01

## 2021-09-01 RX ORDER — TRAMADOL HYDROCHLORIDE 50 MG/1
TABLET ORAL
Qty: 180 TABLET | Refills: 0 | Status: SHIPPED | OUTPATIENT
Start: 2021-09-01 | End: 2021-09-29 | Stop reason: SDUPTHER

## 2021-09-01 RX ADMIN — PROPOFOL 30 MG: 10 INJECTION, EMULSION INTRAVENOUS at 07:38

## 2021-09-01 RX ADMIN — PROPOFOL 30 MG: 10 INJECTION, EMULSION INTRAVENOUS at 07:42

## 2021-09-01 RX ADMIN — LIDOCAINE HYDROCHLORIDE 80 MG: 20 INJECTION, SOLUTION EPIDURAL; INFILTRATION; INTRACAUDAL; PERINEURAL at 07:34

## 2021-09-01 RX ADMIN — PROPOFOL 30 MG: 10 INJECTION, EMULSION INTRAVENOUS at 07:44

## 2021-09-01 RX ADMIN — PROPOFOL 100 MG: 10 INJECTION, EMULSION INTRAVENOUS at 07:34

## 2021-09-01 RX ADMIN — PROPOFOL 20 MG: 10 INJECTION, EMULSION INTRAVENOUS at 07:36

## 2021-09-01 RX ADMIN — SODIUM CHLORIDE 125 ML/HR: 0.9 INJECTION, SOLUTION INTRAVENOUS at 07:16

## 2021-09-01 RX ADMIN — PROPOFOL 30 MG: 10 INJECTION, EMULSION INTRAVENOUS at 07:40

## 2021-09-01 NOTE — DISCHARGE INSTRUCTIONS
Colonoscopy   WHAT YOU NEED TO KNOW:   A colonoscopy is a procedure to examine the inside of your colon (intestine) with a scope  Polyps or tissue growths may have been removed during your colonoscopy  It is normal to feel bloated and to have some abdominal discomfort  You should be passing gas  If you have hemorrhoids or you had polyps removed, you may have a small amount of bleeding  DISCHARGE INSTRUCTIONS:   Seek care immediately if:    You have sudden, severe abdominal pain   You have problems swallowing   You have a large amount of black, sticky bowel movements or blood in your bowel movements   You have sudden trouble breathing   You feel weak, lightheaded, or faint or your heart beats faster than normal for you  Contact your healthcare provider if:    You have a fever and chills   You have nausea or are vomiting   Your abdomen is bloated or feels full and hard   You have abdominal pain   You have black, sticky bowel movements or blood in your bowel movements   You have not had a bowel movement for 3 days after your procedure   You have rash or hives   You have questions or concerns about your procedure  Activity:    Do not lift, strain, or run for 24 hours after your procedure   Rest after your procedure  You have been given medicine to relax you  Do not drive or make important decisions until the day after your procedure  Return to your normal activity as directed   Relieve gas and discomfort from bloating by lying on your right side with a heating pad on your abdomen  You may need to take short walks to help the gas move out  Eat small meals until bloating is relieved  Follow up with your healthcare provider as directed: Write down your questions so you remember to ask them during your visits  If you take a blood thinner, please review the specific instructions from your endoscopist about when you should resume it   These can be found in the Recommendation and Your Medication list sections of this After Visit Summary  Colorectal Polyps   WHAT YOU NEED TO KNOW:   Colorectal polyps are small growths of tissue in the lining of the colon and rectum  Most polyps are hyperplastic polyps and are usually benign (noncancerous)  Certain types of polyps, called adenomatous polyps, may turn into cancer  DISCHARGE INSTRUCTIONS:   Follow up with your healthcare provider or gastroenterologist as directed: You may need to return for more tests, such as another colonoscopy  Write down your questions so you remember to ask them during your visits  Reduce your risk for colorectal polyps:   · Eat a variety of healthy foods:  Healthy foods include fruit, vegetables, whole-grain breads, low-fat dairy products, beans, lean meat, and fish  Ask if you need to be on a special diet  · Maintain a healthy weight:  Ask your healthcare provider if you need to lose weight and how much you need to lose  Ask for help with a weight loss program     · Exercise:  Begin to exercise slowly and do more as you get stronger  Talk with your healthcare provider before you start an exercise program      · Limit alcohol:  Your risk for polyps increases the more you drink  · Do not smoke: If you smoke, it is never too late to quit  Ask for information about how to stop  For support and more information:   · Eric Syed ([facility] [place] [place] [address], [place] [address]  Phone: 6- [phone]  Web Address: www digestive  niddk nih gov    Contact your healthcare provider or gastroenterologist if:   · You have a fever  · You have chills, a cough, or feel weak and achy  · You have abdominal pain that does not go away or gets worse after you take medicine  · Your abdomen is swollen  · You are losing weight without trying  · You have questions or concerns about your condition or care      Seek care immediately or call 911 if:   · You have sudden shortness of breath  · You have a fast heart rate, fast breathing, or are too dizzy to stand up  · You have severe abdominal pain  · You see blood in your bowel movement  © Copyright [address] Information is for End User's use only and may not be sold, redistributed or otherwise used for commercial purposes  All illustrations and images included in CareNotes® are the copyrighted property of [employer]  or [facility] Perla Price   The above information is an  only  It is not intended as medical advice for individual conditions or treatments  Talk to your doctor, nurse or pharmacist before following any medical regimen to see if it is safe and effective for you

## 2021-09-01 NOTE — H&P
History and Physical - [facility]  Mathieu Jackson 38 y o  female MRN: [id number]                  HPI: Mathieu Jackson is a 38y o  year old female who presents for colonoscopy for constipation eval       REVIEW OF SYSTEMS: Per the HPI, and otherwise unremarkable      Historical Information   Past Medical History:   Diagnosis Date    Anxiety     Last assessed: 10/11/13    Basal cell carcinoma     on going    Cancer ([facility] )     Skin -Basal Cell    Constipation     Insomnia     Last assessed: 13    Sacroiliitis ([facility] )     Last assessed: 10/11/13    Urinary tract infection 2020    Vitamin D deficiency     Last assessed: 10/11/13     Past Surgical History:   Procedure Laterality Date     SECTION      x2 Last assessed: 14    SKIN LESION EXCISION      Basal cell skin cancer excision at age 12   [facility] SPINE SURGERY      Radiofrequency ablation     Social History   Social History     Substance and Sexual Activity   Alcohol Use Yes    Alcohol/week: 4 0 standard drinks    Types: 4 Glasses of wine per week     Social History     Substance and Sexual Activity   Drug Use No     Social History     Tobacco Use   Smoking Status Former Smoker    Packs/day: 0 25    Years: 10 00    Pack years: 2 50    Quit date:     Years since quittin 6   Smokeless Tobacco Former User    Quit date: 2014   Tobacco Comment    QUIT 4 YEARS AGO     Family History   Problem Relation Age of Onset    Diabetes Family         Mellitus    Hypertension Family     Hypertension Mother     Diabetes Father     ALS Father     Lung cancer Maternal Grandfather 79    Alcohol abuse Maternal Grandfather     Ovarian cancer Paternal Grandmother 61    Cancer Paternal Grandmother         Ovarian    Melanoma Maternal Uncle         age unknown    No Known Problems Sister     No Known Problems Paternal Grandfather     No Known Problems Maternal Aunt     No Known Problems Maternal Aunt     No Known Problems Paternal Aunt     No Known Problems Maternal Grandmother        Meds/Allergies       Current Outpatient Medications:     traMADol (ULTRAM) 50 mg tablet    DULoxetine (CYMBALTA) 30 mg delayed release capsule    methocarbamol (ROBAXIN) 750 mg tablet    mupirocin (BACTROBAN) 2 % ointment    Na Sulfate-K Sulfate-Mg Sulf (Suprep Bowel Prep Kit) 17 5-3 13-1 6 GM/177ML SOLN    naproxen (NAPROSYN) 500 mg tablet    Sodium Fluoride 5000 PPM 1 1 % PSTE    Current Facility-Administered Medications:     sodium chloride 0 9 % infusion, 125 mL/hr, Intravenous, Continuous, 125 mL/hr at 09/01/21 0716    No Known Allergies    Objective     /69   Pulse 67   Temp 98 3 °F (36 8 °C) (Temporal)   Resp 17   SpO2 99%       PHYSICAL EXAM    Gen: NAD  Head: NCAT  CV: RRR  CHEST: Clear  ABD: soft, NT/ND  EXT: no edema      ASSESSMENT/PLAN:  This is a 38y o  year old female here for colonoscopy, and she is stable and optimized for her procedure

## 2021-09-01 NOTE — ANESTHESIA PREPROCEDURE EVALUATION
Procedure:  COLONOSCOPY    Relevant Problems   MUSCULOSKELETAL   (+) Chronic low back pain   (+) Lumbar spondylosis        Physical Exam    Airway    Mallampati score: II  TM Distance: >3 FB  Neck ROM: full     Dental       Cardiovascular  Rhythm: regular, Rate: normal,     Pulmonary  Breath sounds clear to auscultation,     Other Findings        Anesthesia Plan  ASA Score- 2     Anesthesia Type- IV sedation with anesthesia with ASA Monitors  Additional Monitors:   Airway Plan:           Plan Factors-Exercise tolerance (METS): >4 METS  Chart reviewed  Existing labs reviewed  Induction- intravenous  Postoperative Plan-     Informed Consent- Anesthetic plan and risks discussed with patient

## 2021-09-10 ENCOUNTER — TELEPHONE (OUTPATIENT)
Dept: OTHER | Facility: OTHER | Age: 41
End: 2021-09-10

## 2021-09-13 NOTE — TELEPHONE ENCOUNTER
I returned call to patient, left vm of results, , sent results via Nicole Rolling placed recall   Advised pt to return our call with any questions

## 2021-09-28 ENCOUNTER — PATIENT MESSAGE (OUTPATIENT)
Dept: FAMILY MEDICINE CLINIC | Facility: CLINIC | Age: 41
End: 2021-09-28

## 2021-09-28 DIAGNOSIS — G89.29 CHRONIC BACK PAIN, UNSPECIFIED BACK LOCATION, UNSPECIFIED BACK PAIN LATERALITY: ICD-10-CM

## 2021-09-28 DIAGNOSIS — M54.9 CHRONIC BACK PAIN, UNSPECIFIED BACK LOCATION, UNSPECIFIED BACK PAIN LATERALITY: ICD-10-CM

## 2021-09-29 RX ORDER — TRAMADOL HYDROCHLORIDE 50 MG/1
TABLET ORAL
Qty: 180 TABLET | Refills: 0 | Status: SHIPPED | OUTPATIENT
Start: 2021-09-29 | End: 2021-10-27 | Stop reason: SDUPTHER

## 2021-10-27 DIAGNOSIS — G89.29 CHRONIC BACK PAIN, UNSPECIFIED BACK LOCATION, UNSPECIFIED BACK PAIN LATERALITY: ICD-10-CM

## 2021-10-27 DIAGNOSIS — M54.9 CHRONIC BACK PAIN, UNSPECIFIED BACK LOCATION, UNSPECIFIED BACK PAIN LATERALITY: ICD-10-CM

## 2021-10-27 RX ORDER — TRAMADOL HYDROCHLORIDE 50 MG/1
TABLET ORAL
Qty: 180 TABLET | Refills: 0 | Status: SHIPPED | OUTPATIENT
Start: 2021-10-27 | End: 2021-11-26 | Stop reason: SDUPTHER

## 2021-11-26 DIAGNOSIS — G89.29 CHRONIC BACK PAIN, UNSPECIFIED BACK LOCATION, UNSPECIFIED BACK PAIN LATERALITY: ICD-10-CM

## 2021-11-26 DIAGNOSIS — M54.9 CHRONIC BACK PAIN, UNSPECIFIED BACK LOCATION, UNSPECIFIED BACK PAIN LATERALITY: ICD-10-CM

## 2021-11-26 RX ORDER — TRAMADOL HYDROCHLORIDE 50 MG/1
TABLET ORAL
Qty: 180 TABLET | Refills: 0 | Status: SHIPPED | OUTPATIENT
Start: 2021-11-26 | End: 2021-12-22 | Stop reason: SDUPTHER

## 2021-12-13 ENCOUNTER — OFFICE VISIT (OUTPATIENT)
Dept: FAMILY MEDICINE CLINIC | Facility: CLINIC | Age: 41
End: 2021-12-13
Payer: COMMERCIAL

## 2021-12-13 VITALS
HEIGHT: 63 IN | TEMPERATURE: 97.5 F | SYSTOLIC BLOOD PRESSURE: 100 MMHG | BODY MASS INDEX: 22.5 KG/M2 | WEIGHT: 127 LBS | HEART RATE: 82 BPM | RESPIRATION RATE: 16 BRPM | OXYGEN SATURATION: 93 % | DIASTOLIC BLOOD PRESSURE: 68 MMHG

## 2021-12-13 DIAGNOSIS — G89.29 CHRONIC LOW BACK PAIN, UNSPECIFIED BACK PAIN LATERALITY, UNSPECIFIED WHETHER SCIATICA PRESENT: Primary | ICD-10-CM

## 2021-12-13 DIAGNOSIS — M54.50 CHRONIC LOW BACK PAIN, UNSPECIFIED BACK PAIN LATERALITY, UNSPECIFIED WHETHER SCIATICA PRESENT: Primary | ICD-10-CM

## 2021-12-13 DIAGNOSIS — K59.09 CHRONIC CONSTIPATION: ICD-10-CM

## 2021-12-13 DIAGNOSIS — C44.519 BASAL CELL CARCINOMA OF BACK: ICD-10-CM

## 2021-12-13 PROBLEM — L01.00 IMPETIGO: Status: RESOLVED | Noted: 2019-06-06 | Resolved: 2021-12-13

## 2021-12-13 PROBLEM — R10.9 FLANK PAIN: Status: RESOLVED | Noted: 2019-10-10 | Resolved: 2021-12-13

## 2021-12-13 PROBLEM — N30.90 CYSTITIS: Status: RESOLVED | Noted: 2018-07-27 | Resolved: 2021-12-13

## 2021-12-13 PROCEDURE — 1036F TOBACCO NON-USER: CPT | Performed by: FAMILY MEDICINE

## 2021-12-13 PROCEDURE — 99214 OFFICE O/P EST MOD 30 MIN: CPT | Performed by: FAMILY MEDICINE

## 2021-12-13 PROCEDURE — 3008F BODY MASS INDEX DOCD: CPT | Performed by: FAMILY MEDICINE

## 2021-12-13 PROCEDURE — 3725F SCREEN DEPRESSION PERFORMED: CPT | Performed by: FAMILY MEDICINE

## 2021-12-16 DIAGNOSIS — G89.29 CHRONIC LOW BACK PAIN, UNSPECIFIED BACK PAIN LATERALITY, UNSPECIFIED WHETHER SCIATICA PRESENT: ICD-10-CM

## 2021-12-16 DIAGNOSIS — R10.9 FLANK PAIN: ICD-10-CM

## 2021-12-16 DIAGNOSIS — M54.50 CHRONIC LOW BACK PAIN, UNSPECIFIED BACK PAIN LATERALITY, UNSPECIFIED WHETHER SCIATICA PRESENT: ICD-10-CM

## 2021-12-18 RX ORDER — METHOCARBAMOL 750 MG/1
TABLET, FILM COATED ORAL
Qty: 30 TABLET | Refills: 0 | Status: SHIPPED | OUTPATIENT
Start: 2021-12-18

## 2021-12-22 ENCOUNTER — PATIENT MESSAGE (OUTPATIENT)
Dept: FAMILY MEDICINE CLINIC | Facility: CLINIC | Age: 41
End: 2021-12-22

## 2021-12-22 DIAGNOSIS — G89.29 CHRONIC BACK PAIN, UNSPECIFIED BACK LOCATION, UNSPECIFIED BACK PAIN LATERALITY: ICD-10-CM

## 2021-12-22 DIAGNOSIS — M54.9 CHRONIC BACK PAIN, UNSPECIFIED BACK LOCATION, UNSPECIFIED BACK PAIN LATERALITY: ICD-10-CM

## 2021-12-22 RX ORDER — TRAMADOL HYDROCHLORIDE 50 MG/1
TABLET ORAL
Qty: 180 TABLET | Refills: 0 | Status: SHIPPED | OUTPATIENT
Start: 2021-12-22 | End: 2022-01-21 | Stop reason: SDUPTHER

## 2022-01-21 DIAGNOSIS — M54.9 CHRONIC BACK PAIN, UNSPECIFIED BACK LOCATION, UNSPECIFIED BACK PAIN LATERALITY: ICD-10-CM

## 2022-01-21 DIAGNOSIS — G89.29 CHRONIC BACK PAIN, UNSPECIFIED BACK LOCATION, UNSPECIFIED BACK PAIN LATERALITY: ICD-10-CM

## 2022-01-21 RX ORDER — TRAMADOL HYDROCHLORIDE 50 MG/1
TABLET ORAL
Qty: 180 TABLET | Refills: 0 | Status: SHIPPED | OUTPATIENT
Start: 2022-01-21 | End: 2022-02-17 | Stop reason: SDUPTHER

## 2022-02-16 ENCOUNTER — OFFICE VISIT (OUTPATIENT)
Dept: FAMILY MEDICINE CLINIC | Facility: CLINIC | Age: 42
End: 2022-02-16
Payer: COMMERCIAL

## 2022-02-16 VITALS
DIASTOLIC BLOOD PRESSURE: 64 MMHG | WEIGHT: 128 LBS | SYSTOLIC BLOOD PRESSURE: 100 MMHG | TEMPERATURE: 97.2 F | HEIGHT: 63 IN | BODY MASS INDEX: 22.68 KG/M2 | HEART RATE: 94 BPM | RESPIRATION RATE: 16 BRPM | OXYGEN SATURATION: 97 %

## 2022-02-16 DIAGNOSIS — Z13.1 SCREENING FOR DIABETES MELLITUS: ICD-10-CM

## 2022-02-16 DIAGNOSIS — M54.6 ACUTE RIGHT-SIDED THORACIC BACK PAIN: ICD-10-CM

## 2022-02-16 DIAGNOSIS — G89.29 CHRONIC LOW BACK PAIN, UNSPECIFIED BACK PAIN LATERALITY, UNSPECIFIED WHETHER SCIATICA PRESENT: ICD-10-CM

## 2022-02-16 DIAGNOSIS — Z13.220 SCREENING CHOLESTEROL LEVEL: ICD-10-CM

## 2022-02-16 DIAGNOSIS — Z13.29 SCREENING FOR THYROID DISORDER: ICD-10-CM

## 2022-02-16 DIAGNOSIS — M54.2 NECK PAIN: Primary | ICD-10-CM

## 2022-02-16 DIAGNOSIS — M54.50 CHRONIC LOW BACK PAIN, UNSPECIFIED BACK PAIN LATERALITY, UNSPECIFIED WHETHER SCIATICA PRESENT: ICD-10-CM

## 2022-02-16 DIAGNOSIS — Z13.0 SCREENING FOR DEFICIENCY ANEMIA: ICD-10-CM

## 2022-02-16 PROCEDURE — 3008F BODY MASS INDEX DOCD: CPT | Performed by: FAMILY MEDICINE

## 2022-02-16 PROCEDURE — 1036F TOBACCO NON-USER: CPT | Performed by: FAMILY MEDICINE

## 2022-02-16 PROCEDURE — 99213 OFFICE O/P EST LOW 20 MIN: CPT | Performed by: FAMILY MEDICINE

## 2022-02-16 NOTE — PROGRESS NOTES
[facility]      NAME: Parker Acosta  AGE: 42 y o  SEX: female  : 1980   MRN: [id number]    DATE: 2022  TIME: 1:48 PM    Assessment and Plan     Problem List Items Addressed This Visit     Chronic low back pain      Other Visit Diagnoses     Neck pain    -  Primary    Relevant Orders    XR spine cervical complete 4 or 5 vw non injury    Acute right-sided thoracic back pain        Relevant Orders    XR spine thoracic 3 vw    Screening for deficiency anemia        Relevant Orders    CBC and differential    Screening for diabetes mellitus        Relevant Orders    Comprehensive metabolic panel    Screening cholesterol level        Relevant Orders    Lipid Panel with Direct LDL reflex    Screening for thyroid disorder        Relevant Orders    TSH, 3rd generation with Free T4 reflex      Patient presents with to 3 week history of right low back pain radiating up her spine to neck and right shoulder region  Patient has longstanding history of sacroiliac/midline back pain, but lately patient has been experiencing different set of symptoms  No recent trauma  Symptoms may be due to neck problem with radiculopathy into right shoulder and low back or a separate issue (neck and low back as separate issues)  Will send for updated x-rays of cervical and thoracic spine  Will call with results  Possible need for referral to physical therapy and/or advanced imaging  Return to office in:  Will call with x-ray results    Chief Complaint     Chief Complaint   Patient presents with    Back Pain       History of Present Illness     Patient presents with to 3 week history of right low back pain radiating up her spine to neck and right shoulder region  Patient has longstanding history of sacroiliac/midline back pain, but lately patient has been experiencing different set of symptoms  No recent trauma        The following portions of the patient's history were reviewed and updated as appropriate: allergies, current medications, past family history, past medical history, past social history, past surgical history and problem list     Review of Systems   Review of Systems   Respiratory: Negative  Cardiovascular: Negative  Gastrointestinal: Negative  Genitourinary: Negative  Musculoskeletal: Positive for back pain and neck pain         Active Problem List     Patient Active Problem List   Diagnosis    Chronic low back pain    Lumbar spondylosis    Basal cell carcinoma of back    Insomnia    Lumbar pars defect    Chronic constipation    Irritable bowel syndrome with both constipation and diarrhea    Vitamin D deficiency       Objective   /64 (BP Location: Left arm, Patient Position: Sitting, Cuff Size: Adult)   Pulse 94   Temp (!) 97 2 °F (36 2 °C) (Temporal)   Resp 16   Ht 5' 2 99" (1 6 m)   Wt 58 1 kg (128 lb)   SpO2 97%   BMI 22 68 kg/m²     Physical Exam    Pertinent Laboratory/Diagnostic Studies:  Past x-rays reviewed    Current Medications     Current Outpatient Medications:     DULoxetine (CYMBALTA) 30 mg delayed release capsule, Take 1 capsule (30 mg total) by mouth daily, Disp: 90 capsule, Rfl: 1    methocarbamol (ROBAXIN) 750 mg tablet, TAKE 1 TABLET BY MOUTH TWICE A DAY AS NEEDED, Disp: 30 tablet, Rfl: 0    mupirocin (BACTROBAN) 2 % ointment, Apply topically 3 (three) times a day, Disp: 22 g, Rfl: 5    naproxen (NAPROSYN) 500 mg tablet, TAKE 1 TABLET BY MOUTH UP TO TWICE A DAY AS NEEDED, Disp: 60 tablet, Rfl: 2    Sodium Fluoride 5000 PPM 1 1 % PSTE, BRUSH 2 TIMES A DAY FOR 2 MINUTES WITHOUT RINSING, Disp: , Rfl:     traMADol (ULTRAM) 50 mg tablet, 1-2 TABS BY MOUTH EVERY 6 HOURS AS NEEDED (MAX 6 TABS DAILY), Disp: 180 tablet, Rfl: 0    Health Maintenance     Health Maintenance   Topic Date Due    Hepatitis C Screening  Never done    COVID-19 Vaccine (1) Never done    Hepatitis B Vaccine (3 of 3 - 3-dose primary series) 07/22/1999    Influenza Vaccine (1) Never done    Annual Physical  05/06/2022    Breast Cancer Screening: Mammogram  05/19/2022    Depression Screening  12/13/2022    BMI: Adult  02/16/2023    Cervical Cancer Screening  03/14/2023    DTaP,Tdap,and Td Vaccines (2 - Td or Tdap) 07/01/2025    HIV Screening  Addressed    Pneumococcal Vaccine: Pediatrics (0 to 5 Years) and At-Risk Patients (6 to 59 Years)  Aged Out    HIB Vaccine  Aged Out    IPV Vaccine  Aged Out    Hepatitis A Vaccine  Aged Out    Meningococcal ACWY Vaccine  Aged Out    HPV Vaccine  Aged [employer] History   Administered Date(s) Administered    Hep B, adult 12/17/1998, 05/27/1999    MMR 12/17/1998    Tdap 07/01/2015       Jeannine Cardona DO  [facility]

## 2022-02-17 ENCOUNTER — APPOINTMENT (OUTPATIENT)
Dept: LAB | Facility: CLINIC | Age: 42
End: 2022-02-17
Payer: COMMERCIAL

## 2022-02-17 ENCOUNTER — APPOINTMENT (OUTPATIENT)
Dept: RADIOLOGY | Facility: CLINIC | Age: 42
End: 2022-02-17
Payer: COMMERCIAL

## 2022-02-17 DIAGNOSIS — G89.29 CHRONIC BACK PAIN, UNSPECIFIED BACK LOCATION, UNSPECIFIED BACK PAIN LATERALITY: ICD-10-CM

## 2022-02-17 DIAGNOSIS — Z13.1 SCREENING FOR DIABETES MELLITUS: ICD-10-CM

## 2022-02-17 DIAGNOSIS — M54.9 CHRONIC BACK PAIN, UNSPECIFIED BACK LOCATION, UNSPECIFIED BACK PAIN LATERALITY: ICD-10-CM

## 2022-02-17 DIAGNOSIS — Z13.0 SCREENING FOR DEFICIENCY ANEMIA: ICD-10-CM

## 2022-02-17 DIAGNOSIS — M54.2 NECK PAIN: ICD-10-CM

## 2022-02-17 DIAGNOSIS — M54.6 ACUTE RIGHT-SIDED THORACIC BACK PAIN: ICD-10-CM

## 2022-02-17 DIAGNOSIS — Z13.220 SCREENING CHOLESTEROL LEVEL: ICD-10-CM

## 2022-02-17 DIAGNOSIS — Z13.29 SCREENING FOR THYROID DISORDER: ICD-10-CM

## 2022-02-17 LAB
ALBUMIN SERPL BCP-MCNC: 3.8 G/DL (ref 3.5–5)
ALP SERPL-CCNC: 70 U/L (ref 46–116)
ALT SERPL W P-5'-P-CCNC: 19 U/L (ref 12–78)
ANION GAP SERPL CALCULATED.3IONS-SCNC: 5 MMOL/L (ref 4–13)
AST SERPL W P-5'-P-CCNC: 10 U/L (ref 5–45)
BASOPHILS # BLD AUTO: 0.05 THOUSANDS/ΜL (ref 0–0.1)
BASOPHILS NFR BLD AUTO: 1 % (ref 0–1)
BILIRUB SERPL-MCNC: 0.43 MG/DL (ref 0.2–1)
BUN SERPL-MCNC: 10 MG/DL (ref 5–25)
CALCIUM SERPL-MCNC: 8.9 MG/DL (ref 8.3–10.1)
CHLORIDE SERPL-SCNC: 109 MMOL/L (ref 100–108)
CHOLEST SERPL-MCNC: 188 MG/DL
CO2 SERPL-SCNC: 26 MMOL/L (ref 21–32)
CREAT SERPL-MCNC: 0.75 MG/DL (ref 0.6–1.3)
EOSINOPHIL # BLD AUTO: 0.16 THOUSAND/ΜL (ref 0–0.61)
EOSINOPHIL NFR BLD AUTO: 3 % (ref 0–6)
ERYTHROCYTE [DISTWIDTH] IN BLOOD BY AUTOMATED COUNT: 13.1 % (ref 11.6–15.1)
GFR SERPL CREATININE-BSD FRML MDRD: 98 ML/MIN/1.73SQ M
GLUCOSE P FAST SERPL-MCNC: 95 MG/DL (ref 65–99)
HCT VFR BLD AUTO: 38.9 % (ref 34.8–46.1)
HDLC SERPL-MCNC: 68 MG/DL
HGB BLD-MCNC: 12.8 G/DL (ref 11.5–15.4)
IMM GRANULOCYTES # BLD AUTO: 0.02 THOUSAND/UL (ref 0–0.2)
IMM GRANULOCYTES NFR BLD AUTO: 0 % (ref 0–2)
LDLC SERPL CALC-MCNC: 109 MG/DL (ref 0–100)
LYMPHOCYTES # BLD AUTO: 1.48 THOUSANDS/ΜL (ref 0.6–4.47)
LYMPHOCYTES NFR BLD AUTO: 26 % (ref 14–44)
MCH RBC QN AUTO: 29.4 PG (ref 26.8–34.3)
MCHC RBC AUTO-ENTMCNC: 32.9 G/DL (ref 31.4–37.4)
MCV RBC AUTO: 89 FL (ref 82–98)
MONOCYTES # BLD AUTO: 0.42 THOUSAND/ΜL (ref 0.17–1.22)
MONOCYTES NFR BLD AUTO: 7 % (ref 4–12)
NEUTROPHILS # BLD AUTO: 3.59 THOUSANDS/ΜL (ref 1.85–7.62)
NEUTS SEG NFR BLD AUTO: 63 % (ref 43–75)
NRBC BLD AUTO-RTO: 0 /100 WBCS
PLATELET # BLD AUTO: 280 THOUSANDS/UL (ref 149–390)
PMV BLD AUTO: 10.1 FL (ref 8.9–12.7)
POTASSIUM SERPL-SCNC: 4.1 MMOL/L (ref 3.5–5.3)
PROT SERPL-MCNC: 6.6 G/DL (ref 6.4–8.2)
RBC # BLD AUTO: 4.35 MILLION/UL (ref 3.81–5.12)
SODIUM SERPL-SCNC: 140 MMOL/L (ref 136–145)
TRIGL SERPL-MCNC: 55 MG/DL
TSH SERPL DL<=0.05 MIU/L-ACNC: 1.47 UIU/ML (ref 0.36–3.74)
WBC # BLD AUTO: 5.72 THOUSAND/UL (ref 4.31–10.16)

## 2022-02-17 PROCEDURE — 80061 LIPID PANEL: CPT

## 2022-02-17 PROCEDURE — 80053 COMPREHEN METABOLIC PANEL: CPT

## 2022-02-17 PROCEDURE — 85025 COMPLETE CBC W/AUTO DIFF WBC: CPT

## 2022-02-17 PROCEDURE — 72050 X-RAY EXAM NECK SPINE 4/5VWS: CPT

## 2022-02-17 PROCEDURE — 84443 ASSAY THYROID STIM HORMONE: CPT

## 2022-02-17 PROCEDURE — 36415 COLL VENOUS BLD VENIPUNCTURE: CPT

## 2022-02-17 PROCEDURE — 72072 X-RAY EXAM THORAC SPINE 3VWS: CPT

## 2022-02-17 RX ORDER — TRAMADOL HYDROCHLORIDE 50 MG/1
TABLET ORAL
Qty: 180 TABLET | Refills: 0 | Status: SHIPPED | OUTPATIENT
Start: 2022-02-17 | End: 2022-03-17 | Stop reason: SDUPTHER

## 2022-02-21 ENCOUNTER — TELEPHONE (OUTPATIENT)
Dept: FAMILY MEDICINE CLINIC | Facility: CLINIC | Age: 42
End: 2022-02-21

## 2022-02-23 DIAGNOSIS — G89.29 CHRONIC LOW BACK PAIN, UNSPECIFIED BACK PAIN LATERALITY, UNSPECIFIED WHETHER SCIATICA PRESENT: Primary | ICD-10-CM

## 2022-02-23 DIAGNOSIS — M54.50 CHRONIC LOW BACK PAIN, UNSPECIFIED BACK PAIN LATERALITY, UNSPECIFIED WHETHER SCIATICA PRESENT: Primary | ICD-10-CM

## 2022-03-17 DIAGNOSIS — G89.29 CHRONIC BACK PAIN, UNSPECIFIED BACK LOCATION, UNSPECIFIED BACK PAIN LATERALITY: ICD-10-CM

## 2022-03-17 DIAGNOSIS — M54.9 CHRONIC BACK PAIN, UNSPECIFIED BACK LOCATION, UNSPECIFIED BACK PAIN LATERALITY: ICD-10-CM

## 2022-03-18 RX ORDER — TRAMADOL HYDROCHLORIDE 50 MG/1
TABLET ORAL
Qty: 180 TABLET | Refills: 0 | Status: SHIPPED | OUTPATIENT
Start: 2022-03-18 | End: 2022-04-14 | Stop reason: SDUPTHER

## 2022-04-13 ENCOUNTER — PATIENT MESSAGE (OUTPATIENT)
Dept: FAMILY MEDICINE CLINIC | Facility: CLINIC | Age: 42
End: 2022-04-13

## 2022-04-13 DIAGNOSIS — M54.9 CHRONIC BACK PAIN, UNSPECIFIED BACK LOCATION, UNSPECIFIED BACK PAIN LATERALITY: ICD-10-CM

## 2022-04-13 DIAGNOSIS — G89.29 CHRONIC BACK PAIN, UNSPECIFIED BACK LOCATION, UNSPECIFIED BACK PAIN LATERALITY: ICD-10-CM

## 2022-04-14 RX ORDER — TRAMADOL HYDROCHLORIDE 50 MG/1
TABLET ORAL
Qty: 180 TABLET | Refills: 0 | Status: SHIPPED | OUTPATIENT
Start: 2022-04-14 | End: 2022-05-12 | Stop reason: SDUPTHER

## 2022-04-25 ENCOUNTER — OFFICE VISIT (OUTPATIENT)
Dept: FAMILY MEDICINE CLINIC | Facility: CLINIC | Age: 42
End: 2022-04-25
Payer: COMMERCIAL

## 2022-04-25 VITALS
OXYGEN SATURATION: 98 % | SYSTOLIC BLOOD PRESSURE: 100 MMHG | DIASTOLIC BLOOD PRESSURE: 64 MMHG | WEIGHT: 123 LBS | BODY MASS INDEX: 21.79 KG/M2 | HEART RATE: 63 BPM | RESPIRATION RATE: 14 BRPM | HEIGHT: 63 IN | TEMPERATURE: 97.7 F

## 2022-04-25 DIAGNOSIS — G89.4 CHRONIC PAIN SYNDROME: ICD-10-CM

## 2022-04-25 DIAGNOSIS — K59.09 CHRONIC CONSTIPATION: ICD-10-CM

## 2022-04-25 DIAGNOSIS — M54.50 CHRONIC LOW BACK PAIN, UNSPECIFIED BACK PAIN LATERALITY, UNSPECIFIED WHETHER SCIATICA PRESENT: Primary | ICD-10-CM

## 2022-04-25 DIAGNOSIS — G89.29 CHRONIC LOW BACK PAIN, UNSPECIFIED BACK PAIN LATERALITY, UNSPECIFIED WHETHER SCIATICA PRESENT: Primary | ICD-10-CM

## 2022-04-25 DIAGNOSIS — C44.519 BASAL CELL CARCINOMA OF BACK: ICD-10-CM

## 2022-04-25 DIAGNOSIS — F32.A MILD DEPRESSION: ICD-10-CM

## 2022-04-25 PROCEDURE — 99214 OFFICE O/P EST MOD 30 MIN: CPT | Performed by: FAMILY MEDICINE

## 2022-04-25 PROCEDURE — 3008F BODY MASS INDEX DOCD: CPT | Performed by: FAMILY MEDICINE

## 2022-04-25 PROCEDURE — 1036F TOBACCO NON-USER: CPT | Performed by: FAMILY MEDICINE

## 2022-04-25 NOTE — PROGRESS NOTES
Assessment/Plan     Problem List Items Addressed This Visit     Chronic low back pain - Primary     Longstanding history of chronic low back pain  Patient has been evaluated by Pain Management Neurosurgery in the past   Status post rhizotomy  Pain is currently stable on tramadol 6 tablets daily  Side effects to Lyrica in the past   Recently started on methocarbamol 750 HS  Patient only uses this sparingly  As long as symptoms are controlled, will will maintain current level of treatment  Consider referral back to pain management if symptoms worsen ([facility])  Note:  Opioid agreement updated today         Basal cell carcinoma of back     History of multiple basal cell carcinomas  Being followed by Dermatology         Chronic constipation     Overall stable on diet, exercise         Mild depression     Overall improved since starting counseling           Other Visit Diagnoses     Chronic pain syndrome           THREE MONTHS      Opiate risks  There are risks associated with opioid medications, including dependence, addiction and tolerance  The patient understands and agrees to use these medications only as prescribed  Potential side effects of the medications include, but are not limited to, constipation, drowsiness, addiction, impaired judgment and risk of fatal overdose if not taken as prescribed  The patient was warned against driving while taking sedation medications  Sharing medications is a felony  At this point in time, the patient is showing no signs of addiction, abuse, diversion or suicidal ideation  Patient is taking concurrent muscle relaxers  Has been counseled on the risks of taking opioids and muscle relaxers including sedation, increased fall risk, dizziness, addictive potential and death  Opioid agreement  Pain management agreement was reviewed with patient and signed/updated during visit      [place]  [place]   No red flags were identified; safe to proceed with prescription          Subjective     Opioid Management:   Type of visit: Follow-up    Pain related diagnoses: chronic low back pain    Screening Tools/Assessments:    PHQ-2/9:  Last PHQ-2 score: 0 (Last PHQ-2 date: 12/13/2021)    Brief Pain Inventory (BPI):  1) Throughout our lives, most of us have had pain from time to time (such as minor headaches, sprains, and toothaches)  Have you had pain other than these everyday kinds of pain today? Yes  2) Where is your pain located?   3) Rate your pain at its worst in the last 24 hours: 3  4) Rate your pain at its least in the last 24 hours: 0  5) Rate your average level of pain: 5  6) Rate your pain right now: 2  7) What treatments or medications are you receiving for your pain? tramadol 50, 6 tabs daily  8) In the past 24 hours, how much relief have pain treatments or medication provided? 70%  9) During the past 24 hours, pain has interfered with your:    A) General activity: 0     B) Mood: 0     C) Walking ability: 0     D) Normal work (work outside the home & housework): 0     E) Relations with other people: 0     F) Sleep: 0     G) Enjoyment of life: 0    Opioid agreement:  Active Opioid agreement on file?: No      Naloxone:  Currently prescribed Naloxone (Narcan): No      HPI  Pain Medications             DULoxetine (CYMBALTA) 30 mg delayed release capsule Take 1 capsule (30 mg total) by mouth daily    methocarbamol (ROBAXIN) 750 mg tablet TAKE 1 TABLET BY MOUTH TWICE A DAY AS NEEDED    naproxen (NAPROSYN) 500 mg tablet TAKE 1 TABLET BY MOUTH UP TO TWICE A DAY AS NEEDED    traMADol (ULTRAM) 50 mg tablet 1-2 TABS BY MOUTH EVERY 6 HOURS AS NEEDED (MAX 6 TABS DAILY)         Outpatient Morphine Milligram Equivalents Per Day     4/25/22 and after Unknown    Order Name Dose Route Frequency Maximum MME/Day     traMADol (ULTRAM) 50 mg tablet     Unknown    Total Potential Morphine Milligram Equivalents Per Day Unknown    An error was encountered while attempting to calculate the morphine milligram equivalents per day  Calculation Information        traMADol (ULTRAM) 50 mg tablet    Not enough information to calculate morphine milligram equivalents per day  PDMP Review       Value Time User    PDMP Reviewed  Yes 10/12/2020  3:11 PM Dave Xiong,          Review of Systems   Respiratory: Negative  Cardiovascular: Negative  Gastrointestinal: Negative  Genitourinary: Negative  Musculoskeletal: Positive for back pain  Objective     /64 (BP Location: Left arm, Patient Position: Sitting, Cuff Size: Adult)   Pulse 63   Temp 97 7 °F (36 5 °C) (Temporal)   Resp 14   Ht 5' 2 99" (1 6 m)   Wt 55 8 kg (123 lb)   LMP 03/22/2022   SpO2 98%   BMI 21 79 kg/m²     Physical Exam  Constitutional:       Appearance: She is well-developed  HENT:      Head: Normocephalic and atraumatic  Eyes:      Pupils: Pupils are equal, round, and reactive to light  Cardiovascular:      Rate and Rhythm: Normal rate and regular rhythm  Heart sounds: Normal heart sounds  Pulmonary:      Effort: Pulmonary effort is normal       Breath sounds: Normal breath sounds  Abdominal:      General: Bowel sounds are normal       Palpations: Abdomen is soft  Musculoskeletal:      Cervical back: Normal range of motion and neck supple  Skin:     General: Skin is warm and dry  Neurological:      Mental Status: She is alert and oriented to person, place, and time  Psychiatric:         Behavior: Behavior normal          Thought Content:  Thought content normal          Judgment: Judgment normal          Acosta Tyrese, DO

## 2022-04-25 NOTE — PATIENT INSTRUCTIONS

## 2022-04-25 NOTE — ASSESSMENT & PLAN NOTE
Longstanding history of chronic low back pain  Patient has been evaluated by Pain Management Neurosurgery in the past   Status post rhizotomy  Pain is currently stable on tramadol 6 tablets daily  Side effects to Lyrica in the past   Recently started on methocarbamol 750 HS  Patient only uses this sparingly  As long as symptoms are controlled, will will maintain current level of treatment  Consider referral back to pain management if symptoms worsen ([facility])      Note:  Opioid agreement updated today

## 2022-05-12 DIAGNOSIS — M54.9 CHRONIC BACK PAIN, UNSPECIFIED BACK LOCATION, UNSPECIFIED BACK PAIN LATERALITY: ICD-10-CM

## 2022-05-12 DIAGNOSIS — G89.29 CHRONIC BACK PAIN, UNSPECIFIED BACK LOCATION, UNSPECIFIED BACK PAIN LATERALITY: ICD-10-CM

## 2022-05-13 RX ORDER — TRAMADOL HYDROCHLORIDE 50 MG/1
TABLET ORAL
Qty: 180 TABLET | Refills: 0 | Status: SHIPPED | OUTPATIENT
Start: 2022-05-13 | End: 2022-06-11 | Stop reason: SDUPTHER

## 2022-06-01 DIAGNOSIS — F32.A MILD DEPRESSION: Primary | ICD-10-CM

## 2022-06-01 RX ORDER — VENLAFAXINE HYDROCHLORIDE 75 MG/1
75 CAPSULE, EXTENDED RELEASE ORAL
Qty: 30 CAPSULE | Refills: 1 | Status: SHIPPED | OUTPATIENT
Start: 2022-06-01 | End: 2022-06-13

## 2022-06-11 ENCOUNTER — PATIENT MESSAGE (OUTPATIENT)
Dept: FAMILY MEDICINE CLINIC | Facility: CLINIC | Age: 42
End: 2022-06-11

## 2022-06-11 DIAGNOSIS — M54.9 CHRONIC BACK PAIN, UNSPECIFIED BACK LOCATION, UNSPECIFIED BACK PAIN LATERALITY: ICD-10-CM

## 2022-06-11 DIAGNOSIS — G89.29 CHRONIC BACK PAIN, UNSPECIFIED BACK LOCATION, UNSPECIFIED BACK PAIN LATERALITY: ICD-10-CM

## 2022-06-11 RX ORDER — TRAMADOL HYDROCHLORIDE 50 MG/1
TABLET ORAL
Qty: 180 TABLET | Refills: 0 | Status: SHIPPED | OUTPATIENT
Start: 2022-06-11 | End: 2022-07-11 | Stop reason: SDUPTHER

## 2022-06-12 DIAGNOSIS — F32.A MILD DEPRESSION: ICD-10-CM

## 2022-06-13 RX ORDER — VENLAFAXINE HYDROCHLORIDE 75 MG/1
CAPSULE, EXTENDED RELEASE ORAL
Qty: 90 CAPSULE | Refills: 1 | Status: SHIPPED | OUTPATIENT
Start: 2022-06-13 | End: 2022-06-16

## 2022-06-16 DIAGNOSIS — F32.A MILD DEPRESSION: ICD-10-CM

## 2022-06-16 RX ORDER — VENLAFAXINE HYDROCHLORIDE 37.5 MG/1
37.5 CAPSULE, EXTENDED RELEASE ORAL DAILY
Qty: 30 CAPSULE | Refills: 0 | Status: SHIPPED | OUTPATIENT
Start: 2022-06-16 | End: 2022-07-11

## 2022-07-09 DIAGNOSIS — F32.A MILD DEPRESSION: ICD-10-CM

## 2022-07-11 RX ORDER — VENLAFAXINE HYDROCHLORIDE 37.5 MG/1
CAPSULE, EXTENDED RELEASE ORAL
Qty: 90 CAPSULE | Refills: 1 | Status: SHIPPED | OUTPATIENT
Start: 2022-07-11 | End: 2022-07-25

## 2022-07-12 ENCOUNTER — TELEPHONE (OUTPATIENT)
Dept: FAMILY MEDICINE CLINIC | Facility: CLINIC | Age: 42
End: 2022-07-12

## 2022-07-25 ENCOUNTER — OFFICE VISIT (OUTPATIENT)
Dept: FAMILY MEDICINE CLINIC | Facility: CLINIC | Age: 42
End: 2022-07-25
Payer: COMMERCIAL

## 2022-07-25 VITALS
HEART RATE: 70 BPM | WEIGHT: 122 LBS | BODY MASS INDEX: 21.62 KG/M2 | SYSTOLIC BLOOD PRESSURE: 100 MMHG | TEMPERATURE: 97.6 F | OXYGEN SATURATION: 98 % | HEIGHT: 63 IN | DIASTOLIC BLOOD PRESSURE: 68 MMHG | RESPIRATION RATE: 16 BRPM

## 2022-07-25 DIAGNOSIS — M54.50 CHRONIC LOW BACK PAIN, UNSPECIFIED BACK PAIN LATERALITY, UNSPECIFIED WHETHER SCIATICA PRESENT: Primary | ICD-10-CM

## 2022-07-25 DIAGNOSIS — C44.519 BASAL CELL CARCINOMA OF BACK: ICD-10-CM

## 2022-07-25 DIAGNOSIS — F32.A MILD DEPRESSION: ICD-10-CM

## 2022-07-25 DIAGNOSIS — Z12.31 ENCOUNTER FOR SCREENING MAMMOGRAM FOR MALIGNANT NEOPLASM OF BREAST: ICD-10-CM

## 2022-07-25 DIAGNOSIS — G89.29 CHRONIC LOW BACK PAIN, UNSPECIFIED BACK PAIN LATERALITY, UNSPECIFIED WHETHER SCIATICA PRESENT: Primary | ICD-10-CM

## 2022-07-25 DIAGNOSIS — K59.09 CHRONIC CONSTIPATION: ICD-10-CM

## 2022-07-25 DIAGNOSIS — G89.4 CHRONIC PAIN SYNDROME: ICD-10-CM

## 2022-07-25 PROCEDURE — 99214 OFFICE O/P EST MOD 30 MIN: CPT | Performed by: FAMILY MEDICINE

## 2022-07-25 NOTE — PROGRESS NOTES
Assessment/Plan     Problem List Items Addressed This Visit     Chronic low back pain - Primary     Longstanding history of chronic low back pain  Patient has been evaluated by Pain Management and Neurosurgery in the past   Status post rhizotomy  Patient has tried Lyrica in the past, but with side effects  Currently stable on tramadol 50 mg, 6 tablets daily  Denies any current side effects  If symptoms worsen, consider referral back to pain management ([facility])         Basal cell carcinoma of back     History of multiple basal cell carcinomas  Being followed by dermatology every 6-9 months (Dr Teagan Wilson)         Chronic constipation     Overall stable  Patient does have a bowel program that she institutes as needed         Mild depression     Patient was started on venlafaxine and in June due to mild depression  She stopped it due to side effects  Since that time, she states her depression has been improving  Does not desire new medication at this time           Other Visit Diagnoses     Encounter for screening mammogram for malignant neoplasm of breast        Relevant Orders    Mammo screening bilateral w 3d & cad    Chronic pain syndrome           last labs February 2022  Will repeat February 2023  Rx given for mammogram    THREE MONTHS      Opiate risks  There are risks associated with opioid medications, including dependence, addiction and tolerance  The patient understands and agrees to use these medications only as prescribed  Potential side effects of the medications include, but are not limited to, constipation, drowsiness, addiction, impaired judgment and risk of fatal overdose if not taken as prescribed  The patient was warned against driving while taking sedation medications  Sharing medications is a felony  At this point in time, the patient is showing no signs of addiction, abuse, diversion or suicidal ideation        Opioid agreement  Pain management agreement was reviewed with patient and signed/updated during visit      [place] review  [place]  No red flags were identified; safe to proceed with prescription      Greater than 50% of this time was spent in counseling/coordination of care regarding: diagnostic results, prognosis, risks and benefits of treatment options, instructions for management, patient and family education, importance of treatment compliance, risk factor reductions and impressions  Subjective     Opioid Management:   Type of visit: Follow-up    Pain related diagnoses: chronic low back pain    Aberrant behavior?: No      Adverse effects from medication?: No      Screening Tools/Assessments:    PHQ-2/9:  Last PHQ-2 score: 0 (Last PHQ-2 date: 12/13/2021)    Brief Pain Inventory (BPI):  1) Throughout our lives, most of us have had pain from time to time (such as minor headaches, sprains, and toothaches)  Have you had pain other than these everyday kinds of pain today? 2) Where is your pain located? 3) Rate your pain at its worst in the last 24 hours: 3  4) Rate your pain at its least in the last 24 hours: 0  5) Rate your average level of pain: 5  6) Rate your pain right now: 2  7) What treatments or medications are you receiving for your pain?   8) In the past 24 hours, how much relief have pain treatments or medication provided? 70%  9) During the past 24 hours, pain has interfered with your:   A) General activity: 0    B) Mood: 0     C) Walking ability: 0     D) Normal work (work outside the home & housework): 0     E) Relations with other people: 0     F) Sleep: 0     G) Enjoyment of life: 0    COMM:  Current COMM Score: 2 (negative, low risk patient)    Opioid agreement:  Active Opioid agreement on file?: Yes    Opioid agreement signed date: 4/25/2022  Opioid agreement expiration date: 4/25/2023    Naloxone:  Currently prescribed Naloxone (Narcan): No    Reason not prescribing Naloxone: not clinically warranted    Patient presents recheck chronic medical problems  Overall stable on current meds without side effects  Last labs were performed February 2022    Pain Medications             methocarbamol (ROBAXIN) 750 mg tablet TAKE 1 TABLET BY MOUTH TWICE A DAY AS NEEDED    naproxen (NAPROSYN) 500 mg tablet TAKE 1 TABLET BY MOUTH UP TO TWICE A DAY AS NEEDED    traMADol (ULTRAM) 50 mg tablet 1-2 TABS BY MOUTH EVERY 6 HOURS AS NEEDED (MAX 6 TABS DAILY)    DULoxetine (CYMBALTA) 30 mg delayed release capsule Take 1 capsule (30 mg total) by mouth daily         Outpatient Morphine Milligram Equivalents Per Day     7/25/22 and after Unknown    Order Name Dose Route Frequency Maximum MME/Day     traMADol (ULTRAM) 50 mg tablet     Unknown    Total Potential Morphine Milligram Equivalents Per Day Unknown    An error was encountered while attempting to calculate the morphine milligram equivalents per day  Calculation Information        traMADol (ULTRAM) 50 mg tablet    Not enough information to calculate morphine milligram equivalents per day  PDMP Review       Value Time User    PDMP Reviewed  Yes 5/13/2022  8:46 AM Jay ZARAGOZA&#39;Bert, DO         Review of Systems   Respiratory: Negative  Cardiovascular: Negative  Gastrointestinal: Negative  Genitourinary: Negative  Musculoskeletal: Positive for back pain  Objective     /68 (BP Location: Left arm, Patient Position: Sitting, Cuff Size: Adult)   Pulse 70   Temp 97 6 °F (36 4 °C) (Temporal)   Resp 16   Ht 5' 2 99" (1 6 m)   Wt 55 3 kg (122 lb)   LMP 07/25/2022   SpO2 98%   BMI 21 62 kg/m²     Physical Exam  Vitals and nursing note reviewed  Constitutional:       General: She is not in acute distress  Appearance: She is well-developed  HENT:      Head: Normocephalic and atraumatic  Eyes:      Conjunctiva/sclera: Conjunctivae normal    Cardiovascular:      Rate and Rhythm: Normal rate and regular rhythm  Heart sounds: No murmur heard    Pulmonary:      Effort: Pulmonary effort is normal  No respiratory distress  Breath sounds: Normal breath sounds  Abdominal:      Tenderness: There is no abdominal tenderness  Musculoskeletal:      Cervical back: Neck supple  Neurological:      Mental Status: She is alert           Celestina العلي DO

## 2022-07-25 NOTE — ASSESSMENT & PLAN NOTE
History of multiple basal cell carcinomas    Being followed by dermatology every 6-9 months (Dr Rahul De Los Santos)

## 2022-07-25 NOTE — ASSESSMENT & PLAN NOTE
Longstanding history of chronic low back pain  Patient has been evaluated by Pain Management and Neurosurgery in the past   Status post rhizotomy  Patient has tried Lyrica in the past, but with side effects  Currently stable on tramadol 50 mg, 6 tablets daily  Denies any current side effects    If symptoms worsen, consider referral back to pain management ([facility])

## 2022-07-25 NOTE — ASSESSMENT & PLAN NOTE
Patient was started on venlafaxine and in June due to mild depression  She stopped it due to side effects  Since that time, she states her depression has been improving    Does not desire new medication at this time

## 2022-08-01 DIAGNOSIS — F32.A MILD DEPRESSION: Primary | ICD-10-CM

## 2022-08-01 RX ORDER — ESCITALOPRAM OXALATE 10 MG/1
10 TABLET ORAL DAILY
Qty: 30 TABLET | Refills: 2 | Status: SHIPPED | OUTPATIENT
Start: 2022-08-01 | End: 2022-08-25

## 2022-08-08 DIAGNOSIS — G89.29 CHRONIC BACK PAIN, UNSPECIFIED BACK LOCATION, UNSPECIFIED BACK PAIN LATERALITY: ICD-10-CM

## 2022-08-08 DIAGNOSIS — M54.9 CHRONIC BACK PAIN, UNSPECIFIED BACK LOCATION, UNSPECIFIED BACK PAIN LATERALITY: ICD-10-CM

## 2022-08-08 RX ORDER — TRAMADOL HYDROCHLORIDE 50 MG/1
TABLET ORAL
Qty: 180 TABLET | Refills: 0 | Status: SHIPPED | OUTPATIENT
Start: 2022-08-08 | End: 2022-09-06 | Stop reason: SDUPTHER

## 2022-08-08 NOTE — TELEPHONE ENCOUNTER
----- Message from Norberto Acosta sent at 8/8/2022  8:34 AM EDT -----  Regarding: Tramadol Refill  Hello Dr Alfredo Barrera,      This message is to request a refill for my Tramadol 50 mg  Please send my refill for my prescription to [facility] in [facility]   [address] [place] [address]   [phone]         Thank you,   Norberto Acosta   [phone]

## 2022-08-18 DIAGNOSIS — R53.83 FATIGUE, UNSPECIFIED TYPE: Primary | ICD-10-CM

## 2022-08-18 DIAGNOSIS — Z13.0 SCREENING FOR DEFICIENCY ANEMIA: ICD-10-CM

## 2022-08-18 DIAGNOSIS — Z13.29 SCREENING FOR THYROID DISORDER: ICD-10-CM

## 2022-08-18 DIAGNOSIS — Z13.220 SCREENING FOR CHOLESTEROL LEVEL: ICD-10-CM

## 2022-08-18 DIAGNOSIS — Z13.1 SCREENING FOR DIABETES MELLITUS: ICD-10-CM

## 2022-08-18 DIAGNOSIS — E55.9 VITAMIN D DEFICIENCY: ICD-10-CM

## 2022-08-19 ENCOUNTER — APPOINTMENT (OUTPATIENT)
Dept: LAB | Facility: CLINIC | Age: 42
End: 2022-08-19
Payer: COMMERCIAL

## 2022-08-19 DIAGNOSIS — Z13.0 SCREENING FOR DEFICIENCY ANEMIA: ICD-10-CM

## 2022-08-19 DIAGNOSIS — Z13.220 SCREENING FOR CHOLESTEROL LEVEL: ICD-10-CM

## 2022-08-19 DIAGNOSIS — Z13.1 SCREENING FOR DIABETES MELLITUS: ICD-10-CM

## 2022-08-19 DIAGNOSIS — Z13.29 SCREENING FOR THYROID DISORDER: ICD-10-CM

## 2022-08-19 DIAGNOSIS — R53.83 FATIGUE, UNSPECIFIED TYPE: ICD-10-CM

## 2022-08-19 DIAGNOSIS — E55.9 VITAMIN D DEFICIENCY: ICD-10-CM

## 2022-08-19 LAB
25(OH)D3 SERPL-MCNC: 38.2 NG/ML (ref 30–100)
ALBUMIN SERPL BCP-MCNC: 3.6 G/DL (ref 3.5–5)
ALP SERPL-CCNC: 68 U/L (ref 46–116)
ALT SERPL W P-5'-P-CCNC: 19 U/L (ref 12–78)
ANION GAP SERPL CALCULATED.3IONS-SCNC: 4 MMOL/L (ref 4–13)
AST SERPL W P-5'-P-CCNC: 12 U/L (ref 5–45)
BASOPHILS # BLD AUTO: 0.05 THOUSANDS/ΜL (ref 0–0.1)
BASOPHILS NFR BLD AUTO: 1 % (ref 0–1)
BILIRUB SERPL-MCNC: 0.39 MG/DL (ref 0.2–1)
BUN SERPL-MCNC: 7 MG/DL (ref 5–25)
CALCIUM SERPL-MCNC: 8.5 MG/DL (ref 8.3–10.1)
CHLORIDE SERPL-SCNC: 108 MMOL/L (ref 96–108)
CHOLEST SERPL-MCNC: 194 MG/DL
CO2 SERPL-SCNC: 27 MMOL/L (ref 21–32)
CREAT SERPL-MCNC: 0.91 MG/DL (ref 0.6–1.3)
EOSINOPHIL # BLD AUTO: 0.12 THOUSAND/ΜL (ref 0–0.61)
EOSINOPHIL NFR BLD AUTO: 2 % (ref 0–6)
ERYTHROCYTE [DISTWIDTH] IN BLOOD BY AUTOMATED COUNT: 12.4 % (ref 11.6–15.1)
GFR SERPL CREATININE-BSD FRML MDRD: 78 ML/MIN/1.73SQ M
GLUCOSE P FAST SERPL-MCNC: 87 MG/DL (ref 65–99)
HCT VFR BLD AUTO: 39.7 % (ref 34.8–46.1)
HDLC SERPL-MCNC: 69 MG/DL
HGB BLD-MCNC: 12.9 G/DL (ref 11.5–15.4)
IMM GRANULOCYTES # BLD AUTO: 0.01 THOUSAND/UL (ref 0–0.2)
IMM GRANULOCYTES NFR BLD AUTO: 0 % (ref 0–2)
LDLC SERPL CALC-MCNC: 113 MG/DL (ref 0–100)
LYMPHOCYTES # BLD AUTO: 2.07 THOUSANDS/ΜL (ref 0.6–4.47)
LYMPHOCYTES NFR BLD AUTO: 41 % (ref 14–44)
MCH RBC QN AUTO: 29.8 PG (ref 26.8–34.3)
MCHC RBC AUTO-ENTMCNC: 32.5 G/DL (ref 31.4–37.4)
MCV RBC AUTO: 92 FL (ref 82–98)
MONOCYTES # BLD AUTO: 0.39 THOUSAND/ΜL (ref 0.17–1.22)
MONOCYTES NFR BLD AUTO: 8 % (ref 4–12)
NEUTROPHILS # BLD AUTO: 2.42 THOUSANDS/ΜL (ref 1.85–7.62)
NEUTS SEG NFR BLD AUTO: 48 % (ref 43–75)
NRBC BLD AUTO-RTO: 0 /100 WBCS
PLATELET # BLD AUTO: 304 THOUSANDS/UL (ref 149–390)
PMV BLD AUTO: 9.3 FL (ref 8.9–12.7)
POTASSIUM SERPL-SCNC: 4.1 MMOL/L (ref 3.5–5.3)
PROT SERPL-MCNC: 6.5 G/DL (ref 6.4–8.4)
PTH-INTACT SERPL-MCNC: 39.6 PG/ML (ref 18.4–80.1)
RBC # BLD AUTO: 4.33 MILLION/UL (ref 3.81–5.12)
SODIUM SERPL-SCNC: 139 MMOL/L (ref 135–147)
TRIGL SERPL-MCNC: 61 MG/DL
TSH SERPL DL<=0.05 MIU/L-ACNC: 2.87 UIU/ML (ref 0.45–4.5)
WBC # BLD AUTO: 5.06 THOUSAND/UL (ref 4.31–10.16)

## 2022-08-19 PROCEDURE — 82306 VITAMIN D 25 HYDROXY: CPT

## 2022-08-19 PROCEDURE — 80061 LIPID PANEL: CPT

## 2022-08-19 PROCEDURE — 80053 COMPREHEN METABOLIC PANEL: CPT

## 2022-08-19 PROCEDURE — 85025 COMPLETE CBC W/AUTO DIFF WBC: CPT

## 2022-08-19 PROCEDURE — 36415 COLL VENOUS BLD VENIPUNCTURE: CPT

## 2022-08-19 PROCEDURE — 83970 ASSAY OF PARATHORMONE: CPT

## 2022-08-19 PROCEDURE — 84443 ASSAY THYROID STIM HORMONE: CPT

## 2022-08-25 DIAGNOSIS — F32.A MILD DEPRESSION: ICD-10-CM

## 2022-08-25 RX ORDER — ESCITALOPRAM OXALATE 10 MG/1
TABLET ORAL
Qty: 90 TABLET | Refills: 1 | Status: SHIPPED | OUTPATIENT
Start: 2022-08-25 | End: 2022-09-29

## 2022-09-05 ENCOUNTER — PATIENT MESSAGE (OUTPATIENT)
Dept: FAMILY MEDICINE CLINIC | Facility: CLINIC | Age: 42
End: 2022-09-05

## 2022-09-05 DIAGNOSIS — M54.9 CHRONIC BACK PAIN, UNSPECIFIED BACK LOCATION, UNSPECIFIED BACK PAIN LATERALITY: ICD-10-CM

## 2022-09-05 DIAGNOSIS — G89.29 CHRONIC BACK PAIN, UNSPECIFIED BACK LOCATION, UNSPECIFIED BACK PAIN LATERALITY: ICD-10-CM

## 2022-09-06 RX ORDER — TRAMADOL HYDROCHLORIDE 50 MG/1
TABLET ORAL
Qty: 180 TABLET | Refills: 0 | Status: SHIPPED | OUTPATIENT
Start: 2022-09-06 | End: 2022-10-03 | Stop reason: SDUPTHER

## 2022-09-29 DIAGNOSIS — F32.A MILD DEPRESSION: Primary | ICD-10-CM

## 2022-09-29 RX ORDER — CITALOPRAM 20 MG/1
20 TABLET ORAL DAILY
Qty: 30 TABLET | Refills: 2 | Status: SHIPPED | OUTPATIENT
Start: 2022-09-29 | End: 2022-10-21

## 2022-10-02 ENCOUNTER — PATIENT MESSAGE (OUTPATIENT)
Dept: FAMILY MEDICINE CLINIC | Facility: CLINIC | Age: 42
End: 2022-10-02

## 2022-10-02 DIAGNOSIS — G89.29 CHRONIC BACK PAIN, UNSPECIFIED BACK LOCATION, UNSPECIFIED BACK PAIN LATERALITY: ICD-10-CM

## 2022-10-02 DIAGNOSIS — M54.9 CHRONIC BACK PAIN, UNSPECIFIED BACK LOCATION, UNSPECIFIED BACK PAIN LATERALITY: ICD-10-CM

## 2022-10-03 RX ORDER — TRAMADOL HYDROCHLORIDE 50 MG/1
TABLET ORAL
Qty: 180 TABLET | Refills: 0 | Status: SHIPPED | OUTPATIENT
Start: 2022-10-03

## 2022-10-21 DIAGNOSIS — F32.A MILD DEPRESSION: ICD-10-CM

## 2022-10-21 RX ORDER — CITALOPRAM 20 MG/1
TABLET ORAL
Qty: 90 TABLET | Refills: 1 | Status: SHIPPED | OUTPATIENT
Start: 2022-10-21

## 2022-11-02 ENCOUNTER — PATIENT MESSAGE (OUTPATIENT)
Dept: FAMILY MEDICINE CLINIC | Facility: CLINIC | Age: 42
End: 2022-11-02

## 2022-11-02 DIAGNOSIS — G89.29 CHRONIC BACK PAIN, UNSPECIFIED BACK LOCATION, UNSPECIFIED BACK PAIN LATERALITY: ICD-10-CM

## 2022-11-02 DIAGNOSIS — M54.9 CHRONIC BACK PAIN, UNSPECIFIED BACK LOCATION, UNSPECIFIED BACK PAIN LATERALITY: ICD-10-CM

## 2022-11-03 RX ORDER — TRAMADOL HYDROCHLORIDE 50 MG/1
TABLET ORAL
Qty: 180 TABLET | Refills: 0 | Status: SHIPPED | OUTPATIENT
Start: 2022-11-03

## 2022-11-07 ENCOUNTER — OFFICE VISIT (OUTPATIENT)
Dept: FAMILY MEDICINE CLINIC | Facility: CLINIC | Age: 42
End: 2022-11-07

## 2022-11-07 VITALS
HEIGHT: 63 IN | OXYGEN SATURATION: 98 % | SYSTOLIC BLOOD PRESSURE: 100 MMHG | WEIGHT: 125 LBS | RESPIRATION RATE: 16 BRPM | HEART RATE: 70 BPM | BODY MASS INDEX: 22.15 KG/M2 | TEMPERATURE: 97.7 F | DIASTOLIC BLOOD PRESSURE: 60 MMHG

## 2022-11-07 DIAGNOSIS — K59.09 CHRONIC CONSTIPATION: ICD-10-CM

## 2022-11-07 DIAGNOSIS — M54.50 CHRONIC LOW BACK PAIN, UNSPECIFIED BACK PAIN LATERALITY, UNSPECIFIED WHETHER SCIATICA PRESENT: Primary | ICD-10-CM

## 2022-11-07 DIAGNOSIS — Z13.0 SCREENING FOR DEFICIENCY ANEMIA: ICD-10-CM

## 2022-11-07 DIAGNOSIS — F32.A MILD DEPRESSION: ICD-10-CM

## 2022-11-07 DIAGNOSIS — G89.29 CHRONIC LOW BACK PAIN, UNSPECIFIED BACK PAIN LATERALITY, UNSPECIFIED WHETHER SCIATICA PRESENT: Primary | ICD-10-CM

## 2022-11-07 DIAGNOSIS — C44.519 BASAL CELL CARCINOMA OF BACK: ICD-10-CM

## 2022-11-07 DIAGNOSIS — G89.29 OTHER CHRONIC PAIN: ICD-10-CM

## 2022-11-07 DIAGNOSIS — Z13.29 SCREENING FOR THYROID DISORDER: ICD-10-CM

## 2022-11-07 DIAGNOSIS — Z13.1 SCREENING FOR DIABETES MELLITUS: ICD-10-CM

## 2022-11-07 NOTE — ASSESSMENT & PLAN NOTE
Longstanding history of chronic low back pain, sacroiliitis  Patient has been evaluated by Pain Management and Neurosurgery in the past   She is status post rhizotomy  She has tried multiple medications in the past including NSAIDs, muscle relaxers, and Lyrica without benefit  Currently stable on tramadol 50 mg, 6 tablets daily  She denies any side effects  Patient has updated opioid contract      UDS collected today

## 2022-11-07 NOTE — PROGRESS NOTES
Assessment/Plan     Problem List Items Addressed This Visit     Chronic low back pain - Primary     Longstanding history of chronic low back pain, sacroiliitis  Patient has been evaluated by Pain Management and Neurosurgery in the past   She is status post rhizotomy  She has tried multiple medications in the past including NSAIDs, muscle relaxers, and Lyrica without benefit  Currently stable on tramadol 50 mg, 6 tablets daily  She denies any side effects  Patient has updated opioid contract  UDS collected today         Basal cell carcinoma of back     Continue regular follow-up with dermatology         Chronic constipation     Joie Koenig has longstanding history of intermittent constipation, most likely due to tramadol usage  She uses laxative p r n  Basis  Usual bowel frequency is 1 bowel movement every 3 days  Lately she is been more constipated  She passed small amount of pink colored tissue in toilet yesterday  She denies any fevers chills abdominal pain  Examination today is unremarkable  She has tried MiraLax in past with side effects  Pat Samuels was not covered under formulary  Recommend trial of OTC Benefiber, 1 tsp nightly (increasing by 1/2 tsp every night until desired results)  Consider stool cultures if still passing pink tissue           Mild depression     Doing well since starting citalopram 20           Other Visit Diagnoses     Screening for deficiency anemia        Relevant Orders    CBC and differential    Screening for diabetes mellitus        Relevant Orders    Comprehensive metabolic panel    Screening for thyroid disorder        Relevant Orders    TSH, 3rd generation with Free T4 reflex    Other chronic pain        Relevant Orders    [facility] All Prescribed Meds and Special Instructions    Amphetamines, Methamphetamines    Butalbital    Phenobarbital    Secobarbital    Temazepam    Alprazolam    Clonazepam    Diazepam    Lorazepam    Oxazepam    Gabapentin    Pregabalin    Cocaine Heroin    Buprenorphine    Levorphanol    Meperidine    Naltrexone    Fentanyl    Methadone    Oxycodone    Oxymorphone    Tapentadol    THC    Tramadol    Codeine, Hydrocodone, Hydropmorphone, Morphine    Bath Salts    Ethyl Glucuronide/Ethyl Sulfate    Kratom    Spice    Methylphenidate    Phentermine    Validity Oxidant    Validity Creatinine    Validity pH    Validity Specific             Opiate risks  There are risks associated with opioid medications, including dependence, addiction and tolerance  The patient understands and agrees to use these medications only as prescribed  Potential side effects of the medications include, but are not limited to, constipation, drowsiness, addiction, impaired judgment and risk of fatal overdose if not taken as prescribed  The patient was warned against driving while taking sedation medications  Sharing medications is a felony  At this point in time, the patient is showing no signs of addiction, abuse, diversion or suicidal ideation  Drug screen  Drug screen collected during today's visit      PD[place] review  [place]  No red flags were identified; safe to proceed with prescription      Greater than 50% of this time was spent in counseling/coordination of care regarding: diagnostic results, prognosis, risks and benefits of treatment options, instructions for management, patient and family education, importance of treatment compliance, risk factor reductions and impressions       UDS COLLECTED TODAY  THREE MONTHS, CBC, CMP, TSH PRIOR      Subjective     Opioid Management:   Type of visit: Follow-up    Pain related diagnoses: chronic low back pain    Aberrant behavior?: No      Adverse effects from medication?: No      Screening Tools/Assessments:    PHQ-2/9:  Last PHQ-2 score: 0 (Last PHQ-2 date: 12/13/2021)    Brief Pain Inventory (BPI):  1) Throughout our lives, most of us have had pain from time to time (such as minor headaches, sprains, and toothaches)  Have you had pain other than these everyday kinds of pain today? Yes  2) Where is your pain located? 3) Rate your pain at its worst in the last 24 hours: 3  4) Rate your pain at its least in the last 24 hours: 0  5) Rate your average level of pain: 5  6) Rate your pain right now: 2  7) What treatments or medications are you receiving for your pain? 8) In the past 24 hours, how much relief have pain treatments or medication provided? 70%  9) During the past 24 hours, pain has interfered with your:   A) General activity: 0     B) Mood: 0     C) Walking ability: 0     D) Normal work (work outside the home & housework): 0     E) Relations with other people: 0     F) Sleep: 0     G) Enjoyment of life: 0    COMM:  Current COMM Score: 4 (negative, low risk patient)    Drug Screen:  Date of last drug screen: 11/7/2022    Opioid agreement:  Active Opioid agreement on file?: Yes    Opioid agreement signed date: 4/25/2022  Opioid agreement expiration date: 4/25/2023    Naloxone:  Currently prescribed Naloxone (Narcan): No    Reason not prescribing Naloxone: not clinically warranted    Patient presents for recheck chronic medical problems today  She is complaining of intermittent constipation again  Back Pain  The current episode started more than 1 year ago  The problem occurs constantly  The problem has been waxing and waning since onset  The pain is present in the lumbar spine  The quality of the pain is described as aching and stabbing  The pain does not radiate  The pain is at a severity of 10/10  The pain is worse during the night  The symptoms are aggravated by position and lying down  Stiffness is present at night  Associated symptoms include bladder incontinence and bowel incontinence  Pertinent negatives include no abdominal pain       Pain Medications             citalopram (CeleXA) 20 mg tablet TAKE 1 TABLET BY MOUTH EVERY DAY    traMADol (ULTRAM) 50 mg tablet 1-2 TABS BY MOUTH EVERY 6 HOURS AS NEEDED (MAX 6 TABS DAILY)         Outpatient Morphine Milligram Equivalents Per Day     11/7/22 and after Unknown    Order Name Dose Route Frequency Maximum MME/Day     traMADol (ULTRAM) 50 mg tablet     Unknown    Total Potential Morphine Milligram Equivalents Per Day Unknown    An error was encountered while attempting to calculate the morphine milligram equivalents per day  Calculation Information        traMADol (ULTRAM) 50 mg tablet    Not enough information to calculate morphine milligram equivalents per day  PDMP Review       Value Time User    PDMP Reviewed  Yes 5/13/2022  8:46 AM Luisa Patrick O&#39;Bert, DO         Review of Systems   Respiratory: Negative  Cardiovascular: Negative  Gastrointestinal: Positive for bowel incontinence  Negative for abdominal pain  Genitourinary: Positive for bladder incontinence  Musculoskeletal: Positive for back pain  Objective     /60 (BP Location: Left arm, Patient Position: Sitting, Cuff Size: Adult)   Pulse 70   Temp 97 7 °F (36 5 °C) (Temporal)   Resp 16   Ht 5' 2 99" (1 6 m)   Wt 56 7 kg (125 lb)   LMP 11/01/2022   SpO2 98%   BMI 22 15 kg/m²     Physical Exam  Vitals and nursing note reviewed  Constitutional:       General: She is not in acute distress  Appearance: She is well-developed  HENT:      Head: Normocephalic and atraumatic  Eyes:      Conjunctiva/sclera: Conjunctivae normal    Cardiovascular:      Rate and Rhythm: Normal rate and regular rhythm  Heart sounds: No murmur heard  Pulmonary:      Effort: Pulmonary effort is normal  No respiratory distress  Breath sounds: Normal breath sounds  Abdominal:      Palpations: Abdomen is soft  Tenderness: There is no abdominal tenderness  Musculoskeletal:      Cervical back: Neck supple  Skin:     General: Skin is warm and dry  Neurological:      Mental Status: She is alert           Aurther Dancer, DO

## 2022-11-07 NOTE — ASSESSMENT & PLAN NOTE
Annika Quinn has longstanding history of intermittent constipation, most likely due to tramadol usage  She uses laxative p r n  Basis  Usual bowel frequency is 1 bowel movement every 3 days  Lately she is been more constipated  She passed small amount of pink colored tissue in toilet yesterday  She denies any fevers chills abdominal pain  Examination today is unremarkable  She has tried MiraLax in past with side effects  Orestes Hinds was not covered under formulary  Recommend trial of OTC Benefiber, 1 tsp nightly (increasing by 1/2 tsp every night until desired results)  Consider stool cultures if still passing pink tissue

## 2022-11-07 NOTE — PATIENT INSTRUCTIONS
For constipation, start OTC Benefiber:  1 tsp nightly  Increase by 1/2 tsp nightly until desired results  Goals of care:  Maximize your health and quality of life by:   · Increasing your level of function and activity  · Decreasing the negative effects of pain on your life  · Minimizing the risks and side effects of medications and ensuring safe use of opioid medication     Ways for you to help meet your goals:  Maintain a healthy lifestyle  This includes proper nutrition, regular physical activity as able, try for 8 hours of sleep per night, use stress reduction strategies, avoid triggers  Risks and side effects of opioid use:  Prescription opioids carry serious risks of addiction and  overdose, especially with prolonged use  An opioid overdose,  often marked by slowed breathing, can cause sudden death  The  use of prescription opioids can have a number of side effects as  well, even when taken as directed:  · Tolerance--meaning you might need to take more of a medication for the same pain relief  · Physical dependence--meaning you have symptoms of withdrawal when a medication is stopped  · Increased sensitivity to pain  · Constipation  · Nausea, vomiting, dry mouth  · Sleepiness and dizziness   · Confusion  · Depression  · Low levels of testosterone that can result in lower sex drive, energy, and strength  · Itching and sweating    If you are prescribed opioids for pain:  · Never take opioids in greater amounts or more often than prescribed  · Help prevent misuse and abuse  - Never sell or share prescription opioids         - Never use another person’s prescription opioids  · ‡Store prescription opioids in a secure place and out of reach of others (this may include visitors, children, friends, and family)    · Safely dispose of unused prescription opioids: Find your community drug take-back program or your pharmacy mail-back program, or flush them down the toilet, following guidance from the Food and Drug Administration (www fda gov/Drugs/ResourcesForYou)  · ‡Visit www cdc gov/drugoverdose to learn about the risks of opioid abuse and overdose  · If you believe you may be struggling with addiction, tell your health care provider and ask for guidance or call [facility]’s National Helpline at 8-324-176-URBT

## 2022-11-10 LAB
6MAM UR QL CFM: NEGATIVE NG/ML
7AMINOCLONAZEPAM UR QL CFM: NEGATIVE NG/ML
A-OH ALPRAZ UR QL CFM: NEGATIVE NG/ML
ACCEPTABLE CREAT UR QL: NORMAL MG/DL
ACCEPTIBLE SP GR UR QL: NORMAL
AMPHET UR QL CFM: NEGATIVE NG/ML
BUPRENORPHINE UR QL CFM: NEGATIVE NG/ML
BUTALBITAL UR QL CFM: NEGATIVE NG/ML
BZE UR QL CFM: NEGATIVE NG/ML
CODEINE UR QL CFM: NEGATIVE NG/ML
EDDP UR QL CFM: NEGATIVE NG/ML
ETHYL GLUCURONIDE UR QL CFM: NEGATIVE NG/ML
ETHYL SULFATE UR QL SCN: NEGATIVE NG/ML
EUTYLONE UR QL: NEGATIVE NG/ML
FENTANYL UR QL CFM: NEGATIVE NG/ML
GLIADIN IGG SER IA-ACNC: NEGATIVE NG/ML
HYDROCODONE UR QL CFM: NEGATIVE NG/ML
HYDROMORPHONE UR QL CFM: NEGATIVE NG/ML
LORAZEPAM UR QL CFM: NEGATIVE NG/ML
ME-PHENIDATE UR QL CFM: NEGATIVE NG/ML
MEPERIDINE UR QL CFM: NEGATIVE NG/ML
METHADONE UR QL CFM: NEGATIVE NG/ML
METHAMPHET UR QL CFM: NEGATIVE NG/ML
MORPHINE UR QL CFM: NEGATIVE NG/ML
NALTREXONE UR QL CFM: NEGATIVE NG/ML
NITRITE UR QL: NORMAL UG/ML
NORBUPRENORPHINE UR QL CFM: NEGATIVE NG/ML
NORDIAZEPAM UR QL CFM: NEGATIVE NG/ML
NORFENTANYL UR QL CFM: NEGATIVE NG/ML
NORHYDROCODONE UR QL CFM: NEGATIVE NG/ML
NORMEPERIDINE UR QL CFM: NEGATIVE NG/ML
NOROXYCODONE UR QL CFM: NEGATIVE NG/ML
OXAZEPAM UR QL CFM: NEGATIVE NG/ML
OXYCODONE UR QL CFM: NEGATIVE NG/ML
OXYMORPHONE UR QL CFM: NEGATIVE NG/ML
OXYMORPHONE UR QL CFM: NEGATIVE NG/ML
PARA-FLUOROFENTANYL QUANTIFICATION: NORMAL NG/ML
PHENOBARB UR QL CFM: NEGATIVE NG/ML
RESULT ALL_PRESCRIBED MEDS AND SPECIAL INSTRUCTIONS: NORMAL
SECOBARBITAL UR QL CFM: NEGATIVE NG/ML
SL AMB 4-ANPP QUANTIFICATION: NORMAL NG/ML
SL AMB 5F-ADB-M7 METABOLITE QUANTIFICATION: NEGATIVE NG/ML
SL AMB 7-OH-MITRAGYNINE (KRATOM ALKALOID) QUANTIFICATION: NEGATIVE NG/ML
SL AMB AB-FUBINACA-M3 METABOLITE QUANTIFICATION: NEGATIVE NG/ML
SL AMB ACETYL FENTANYL QUANTIFICATION: NORMAL NG/ML
SL AMB ACETYL NORFENTANYL QUANTIFICATION: NORMAL NG/ML
SL AMB ACRYL FENTANYL QUANTIFICATION: NORMAL NG/ML
SL AMB CARFENTANIL QUANTIFICATION: NORMAL NG/ML
SL AMB CTHC (MARIJUANA METABOLITE) QUANTIFICATION: NEGATIVE NG/ML
SL AMB DEXTRORPHAN (DEXTROMETHORPHAN METABOLITE) QUANT: NEGATIVE NG/ML
SL AMB GABAPENTIN QUANTIFICATION: NEGATIVE
SL AMB JWH018 METABOLITE QUANTIFICATION: NEGATIVE NG/ML
SL AMB JWH073 METABOLITE QUANTIFICATION: NEGATIVE NG/ML
SL AMB MDMB-FUBINACA-M1 METABOLITE QUANTIFICATION: NEGATIVE NG/ML
SL AMB METHYLONE QUANTIFICATION: NEGATIVE NG/ML
SL AMB N-DESMETHYL-TRAMADOL QUANTIFICATION: NORMAL NG/ML
SL AMB PHENTERMINE QUANTIFICATION: NEGATIVE NG/ML
SL AMB PREGABALIN QUANTIFICATION: NEGATIVE
SL AMB RCS4 METABOLITE QUANTIFICATION: NEGATIVE NG/ML
SL AMB RITALINIC ACID QUANTIFICATION: NEGATIVE NG/ML
SMOOTH MUSCLE AB TITR SER IF: NEGATIVE NG/ML
SPECIMEN DRAWN SERPL: NEGATIVE NG/ML
SPECIMEN PH ACCEPTABLE UR: NORMAL
TAPENTADOL UR QL CFM: NEGATIVE NG/ML
TEMAZEPAM UR QL CFM: NEGATIVE NG/ML
TEMAZEPAM UR QL CFM: NEGATIVE NG/ML
TRAMADOL UR QL CFM: NORMAL NG/ML
URATE/CREAT 24H UR: NORMAL NG/ML

## 2022-11-23 ENCOUNTER — PATIENT MESSAGE (OUTPATIENT)
Dept: FAMILY MEDICINE CLINIC | Facility: CLINIC | Age: 42
End: 2022-11-23

## 2022-11-23 DIAGNOSIS — F32.A MILD DEPRESSION: ICD-10-CM

## 2022-11-23 RX ORDER — CITALOPRAM 20 MG/1
20 TABLET ORAL DAILY
Qty: 90 TABLET | Refills: 1 | Status: SHIPPED | OUTPATIENT
Start: 2022-11-23 | End: 2022-11-25 | Stop reason: SDUPTHER

## 2022-11-25 RX ORDER — CITALOPRAM 20 MG/1
20 TABLET ORAL DAILY
Qty: 7 TABLET | Refills: 0 | Status: SHIPPED | OUTPATIENT
Start: 2022-11-25

## 2022-11-28 ENCOUNTER — PATIENT MESSAGE (OUTPATIENT)
Dept: FAMILY MEDICINE CLINIC | Facility: CLINIC | Age: 42
End: 2022-11-28

## 2022-11-28 DIAGNOSIS — G89.29 CHRONIC BACK PAIN, UNSPECIFIED BACK LOCATION, UNSPECIFIED BACK PAIN LATERALITY: ICD-10-CM

## 2022-11-28 DIAGNOSIS — M54.9 CHRONIC BACK PAIN, UNSPECIFIED BACK LOCATION, UNSPECIFIED BACK PAIN LATERALITY: ICD-10-CM

## 2022-11-28 RX ORDER — TRAMADOL HYDROCHLORIDE 50 MG/1
TABLET ORAL
Qty: 180 TABLET | Refills: 0 | Status: SHIPPED | OUTPATIENT
Start: 2022-11-28

## 2022-12-05 ENCOUNTER — HOSPITAL ENCOUNTER (OUTPATIENT)
Dept: MAMMOGRAPHY | Facility: MEDICAL CENTER | Age: 42
Discharge: HOME | End: 2022-12-05

## 2022-12-05 VITALS — WEIGHT: 125 LBS | HEIGHT: 63 IN | BODY MASS INDEX: 22.15 KG/M2

## 2022-12-05 DIAGNOSIS — Z12.31 ENCOUNTER FOR SCREENING MAMMOGRAM FOR MALIGNANT NEOPLASM OF BREAST: ICD-10-CM

## 2022-12-26 ENCOUNTER — PATIENT MESSAGE (OUTPATIENT)
Dept: FAMILY MEDICINE CLINIC | Facility: CLINIC | Age: 42
End: 2022-12-26

## 2022-12-26 DIAGNOSIS — M54.9 CHRONIC BACK PAIN, UNSPECIFIED BACK LOCATION, UNSPECIFIED BACK PAIN LATERALITY: ICD-10-CM

## 2022-12-26 DIAGNOSIS — G89.29 CHRONIC BACK PAIN, UNSPECIFIED BACK LOCATION, UNSPECIFIED BACK PAIN LATERALITY: ICD-10-CM

## 2022-12-27 RX ORDER — TRAMADOL HYDROCHLORIDE 50 MG/1
TABLET ORAL
Qty: 180 TABLET | Refills: 0 | Status: SHIPPED | OUTPATIENT
Start: 2022-12-27

## 2023-01-14 ENCOUNTER — OFFICE VISIT (OUTPATIENT)
Dept: FAMILY MEDICINE CLINIC | Facility: CLINIC | Age: 43
End: 2023-01-14

## 2023-01-14 VITALS
DIASTOLIC BLOOD PRESSURE: 60 MMHG | HEART RATE: 79 BPM | BODY MASS INDEX: 21.97 KG/M2 | OXYGEN SATURATION: 99 % | HEIGHT: 63 IN | SYSTOLIC BLOOD PRESSURE: 92 MMHG | TEMPERATURE: 98.7 F | WEIGHT: 124 LBS

## 2023-01-14 DIAGNOSIS — F11.90 CHRONIC, CONTINUOUS USE OF OPIOIDS: ICD-10-CM

## 2023-01-14 DIAGNOSIS — K59.09 CHRONIC CONSTIPATION: Primary | ICD-10-CM

## 2023-01-14 DIAGNOSIS — K58.2 IRRITABLE BOWEL SYNDROME WITH BOTH CONSTIPATION AND DIARRHEA: ICD-10-CM

## 2023-01-14 NOTE — ASSESSMENT & PLAN NOTE
Patient complaining of 6-month (or longer ) history of ongoing bowel problems, mostly constipation  Patient states that she needs to take laxatives every 3 days in order to go to the bathroom  She also occasionally finds unusual tissue in her stools  She had a colonoscopy September 2021 (next due September 2024)  She states appetite is okay  She does admit to occasional nausea  Denies vomiting  Denies any blood in stool  Denies any diarrhea  She does have history of chronic constipation due to tramadol usage  Exam today is unremarkable  Colonoscopy from September 20, 2021 reviewed with patient  Recommend starting Movantik 12 5 mg daily (increasing to 25 mg daily after 1 week if needed)  We will also refer to GI for second opinion (Dr Berta Lockhart)  I instructed Adele Hsu to contact me in 2 weeks with an update on her condition

## 2023-01-14 NOTE — PROGRESS NOTES
Name: Ar Haddad      : 1980      MRN: [id number]  Encounter Provider: Corona Faye DO  Encounter Date: 2023   Encounter department: [address] [place] [address]  Chronic constipation  Assessment & Plan:  Patient complaining of 6-month (or longer ) history of ongoing bowel problems, mostly constipation  Patient states that she needs to take laxatives every 3 days in order to go to the bathroom  She also occasionally finds unusual tissue in her stools  She had a colonoscopy 2021 (next due 2024)  She states appetite is okay  She does admit to occasional nausea  Denies vomiting  Denies any blood in stool  Denies any diarrhea  She does have history of chronic constipation due to tramadol usage  Exam today is unremarkable  Colonoscopy from 2021 reviewed with patient  Recommend starting Movantik 12 5 mg daily (increasing to 25 mg daily after 1 week if needed)  We will also refer to GI for second opinion (Dr Rajiv Houston)  I instructed Eliceo Caicedo to contact me in 2 weeks with an update on her condition  Orders:  -     Naloxegol Oxalate 12 5 MG TABS; Take 1 tablet (12 5 mg total) by mouth every morning  -     Ambulatory Referral to Gastroenterology; Future    2  Irritable bowel syndrome with both constipation and diarrhea  -     Ambulatory Referral to Gastroenterology; Future    3  Chronic, continuous use of opioids  -     Naloxegol Oxalate 12 5 MG TABS; Take 1 tablet (12 5 mg total) by mouth every morning           Subjective     Patient complaining of 6-month (or longer ) history of ongoing bowel problems, mostly constipation  Patient states that she needs to take laxatives every 3 days in order to go to the bathroom  She also occasionally finds unusual tissue in her stools  She had a colonoscopy 2021 (next due 2024)  She states appetite is okay  She does admit to occasional nausea  Denies vomiting  Denies any blood in stool  Denies any diarrhea  Review of Systems   Respiratory: Negative  Cardiovascular: Negative  Gastrointestinal: Positive for constipation  Genitourinary: Negative          Past Medical History:   Diagnosis Date   • Anxiety     Last assessed: 10/11/13   • Basal cell carcinoma     on going   • Cancer ([facility] [address] )     Skin -Basal Cell   • Constipation    • Insomnia     Last assessed: 13   • Sacroiliitis (HCC)     Last assessed: 10/11/13   • Urinary tract infection 2020   • Vitamin D deficiency     Last assessed: 10/11/13     Past Surgical History:   Procedure Laterality Date   •  SECTION      x2 Last assessed: 14   • SKIN LESION EXCISION      Basal cell skin cancer excision at age 12   • SPINE SURGERY      Radiofrequency ablation     Family History   Problem Relation Age of Onset   • Diabetes Family         Mellitus   • Hypertension Family    • Hypertension Mother    • Diabetes Father    • ALS Father    • Lung cancer Maternal Grandfather 79   • Alcohol abuse Maternal Grandfather    • Ovarian cancer Paternal Grandmother 61   • Cancer Paternal Grandmother         Ovarian   • Melanoma Maternal Uncle         age unknown   • No Known Problems Sister    • No Known Problems Paternal Grandfather    • No Known Problems Maternal Aunt    • No Known Problems Maternal Aunt    • No Known Problems Paternal Aunt    • No Known Problems Maternal Grandmother      Social History     Socioeconomic History   • Marital status: /Civil Union     Spouse name: None   • Number of children: None   • Years of education: None   • Highest education level: None   Occupational History   • Occupation: Employed   Tobacco Use   • Smoking status: Former     Packs/day: 0 25     Years: 10 00     Pack years: 2 50     Types: Cigarettes     Quit date:      Years since quittin 0   • Smokeless tobacco: Former     Quit date: 2014   • Tobacco comments:     QUIT 4 YEARS AGO   Vaping Use   • Vaping Use: Never used   Substance and Sexual Activity   • Alcohol use: Yes     Alcohol/week: 4 0 standard drinks     Types: 4 Glasses of wine per week   • Drug use: No   • Sexual activity: Yes     Partners: Male     Birth control/protection: Male Sterilization   Other Topics Concern   • None   Social History Narrative    Lives w/spouse    Lives w/children     Social Determinants of Health     Financial Resource Strain: Not on file   Food Insecurity: Not on file   Transportation Needs: Not on file   Physical Activity: Not on file   Stress: Not on file   Social Connections: Not on file   Intimate Partner Violence: Not on file   Housing Stability: Not on file     Current Outpatient Medications on File Prior to Visit   Medication Sig   • citalopram (CeleXA) 20 mg tablet Take 1 tablet (20 mg total) by mouth daily   • mupirocin (BACTROBAN) 2 % ointment Apply topically 3 (three) times a day   • Sodium Fluoride 5000 PPM 1 1 % PSTE BRUSH 2 TIMES A DAY FOR 2 MINUTES WITHOUT RINSING   • traMADol (ULTRAM) 50 mg tablet 1-2 TABS BY MOUTH EVERY 6 HOURS AS NEEDED (MAX 6 TABS DAILY)     No Known Allergies  Immunization History   Administered Date(s) Administered   • Hep B, adult 12/17/1998, 05/27/1999   • MMR 12/17/1998   • Tdap 07/01/2015       Objective     BP 92/60 (BP Location: Left arm, Patient Position: Sitting, Cuff Size: Standard)   Pulse 79   Temp 98 7 °F (37 1 °C) (Tympanic)   Ht 5' 3" (1 6 m)   Wt 56 2 kg (124 lb)   SpO2 99%   BMI 21 97 kg/m²     Physical Exam  Abdominal:      General: Abdomen is flat  Bowel sounds are normal  There is no distension  Palpations: Abdomen is soft  There is no mass  Tenderness: There is no abdominal tenderness         Jorje Godfrey DO

## 2023-01-21 DIAGNOSIS — K58.2 IRRITABLE BOWEL SYNDROME WITH BOTH CONSTIPATION AND DIARRHEA: ICD-10-CM

## 2023-01-21 DIAGNOSIS — K59.09 CHRONIC CONSTIPATION: Primary | ICD-10-CM

## 2023-01-21 RX ORDER — LINACLOTIDE 145 UG/1
CAPSULE, GELATIN COATED ORAL
Qty: 30 CAPSULE | Refills: 0 | Status: SHIPPED | OUTPATIENT
Start: 2023-01-21

## 2023-01-26 DIAGNOSIS — M54.9 CHRONIC BACK PAIN, UNSPECIFIED BACK LOCATION, UNSPECIFIED BACK PAIN LATERALITY: ICD-10-CM

## 2023-01-26 DIAGNOSIS — G89.29 CHRONIC BACK PAIN, UNSPECIFIED BACK LOCATION, UNSPECIFIED BACK PAIN LATERALITY: ICD-10-CM

## 2023-01-26 RX ORDER — TRAMADOL HYDROCHLORIDE 50 MG/1
TABLET ORAL
Qty: 180 TABLET | Refills: 0 | Status: SHIPPED | OUTPATIENT
Start: 2023-01-26

## 2023-02-09 ENCOUNTER — CONSULT (OUTPATIENT)
Dept: GASTROENTEROLOGY | Facility: CLINIC | Age: 43
End: 2023-02-09

## 2023-02-09 VITALS
WEIGHT: 130.4 LBS | HEIGHT: 63 IN | TEMPERATURE: 98.3 F | BODY MASS INDEX: 23.11 KG/M2 | DIASTOLIC BLOOD PRESSURE: 80 MMHG | SYSTOLIC BLOOD PRESSURE: 116 MMHG

## 2023-02-09 DIAGNOSIS — K58.2 IRRITABLE BOWEL SYNDROME WITH BOTH CONSTIPATION AND DIARRHEA: ICD-10-CM

## 2023-02-09 DIAGNOSIS — R10.10 UPPER ABDOMINAL PAIN: ICD-10-CM

## 2023-02-09 DIAGNOSIS — R10.33 UMBILICAL PAIN: Primary | ICD-10-CM

## 2023-02-09 DIAGNOSIS — K59.09 CHRONIC CONSTIPATION: ICD-10-CM

## 2023-02-09 NOTE — PATIENT INSTRUCTIONS
When you start linzess: start it the morning after a good bowel movement  Take it 30-60 minutes prior to breakfast on empty stomach  It is normal to have diarrhea for the first two weeks  After being on linzess for about 5 days, if it has been working but you still have the burning, give our office a call       Scheduled date of EGD(as of today):02 16 23  Physician performing EGD:DR THOMAS  [facility] of EGD[facility]  Instructions reviewed with patient by:CANDIDO  Clearances:  N/A    U/S scheduled 03 02 23 @[facility]

## 2023-02-09 NOTE — PROGRESS NOTES
[facility] - Outpatient Follow-up Note  Dayana Kahn 36 y o  female MRN: [id number]  Encounter: [id number]          ASSESSMENT AND PLAN:      Ganesh is a 35 y/o female with IBS-C who presents for follow-up of abdominal pain  1  Periumbilical abdominal pain  2  IBS-C  Pt says about 2 weeks ago, she started with periumbilical burning without radiation; she has noticed this is occuring almost daily but does go away after an hour  She says it is usually triggered with eating  She admits that her constipation is not under control as she only moves her bowels once every 2-3 days after using suppositories  She was given linzess by PCP but has not yet started this  Colonoscopy 9/2021 noted 2 polyps, one at 10 mm in size, with sessile serrated adenoma on pathology  -explained to pt that I suspect her pain could be related to her poorly controlled constipation but can get u/s to evaluate GB; she says she would like EGD done at this time, as well  I explained we can await u/s results and to see if her pain alleviates with better constipation control first, however she would like EGD now  -RUQ u/s   -EGD ordered   -stop OTC laxatives   -start linzess 145 mcg 30-60 mins prior to bfast; make sure you start this the morning after a BM  -after 5 days of being on linzess: if it is working but you stll have the burning pain, call our office and we will send in PPI      ______________________________________________________________________    SUBJECTIVE:  Ganesh is a 35 y/o female with IBS-C who presents for follow-up of abdominal pain  Pt says that about 2 weeks ago, she started with pain around her bellybutton without radiation  She says that it is occurring daily but is alleviated quickly after onset  She says that she has noticed it is starting with food intake usually  Pt denies n/v, heartburn, diarrhea, unintentional weight loss, fevers, chills, night sweats, bloody or black BMs   Pt admits that her constipation is not controlled and only moves her bowels once every 2-3 days after using OTC laxatives and suppositories  Pt says she uses NSAIDs once in a while  REVIEW OF SYSTEMS IS OTHERWISE NEGATIVE        Historical Information   Past Medical History:   Diagnosis Date   • Anxiety     Last assessed: 10/11/13   • Basal cell carcinoma     on going   • Cancer ([facility] [address] )     Skin -Basal Cell   • Constipation    • Insomnia     Last assessed: 13   • Sacroiliitis ([facility] [address] )     Last assessed: 10/11/13   • Urinary tract infection 2020   • Vitamin D deficiency     Last assessed: 10/11/13     Past Surgical History:   Procedure Laterality Date   •  SECTION      x2 Last assessed: 14   • SKIN LESION EXCISION      Basal cell skin cancer excision at age 12   • SPINE SURGERY      Radiofrequency ablation     Social History   Social History     Substance and Sexual Activity   Alcohol Use Yes   • Alcohol/week: 4 0 standard drinks   • Types: 4 Glasses of wine per week     Social History     Substance and Sexual Activity   Drug Use No     Social History     Tobacco Use   Smoking Status Former   • Packs/day: 0 25   • Years: 10 00   • Pack years: 2 50   • Types: Cigarettes   • Quit date:    • Years since quittin 1   Smokeless Tobacco Former   • Quit date: 2014   Tobacco Comments    QUIT 4 YEARS AGO     Family History   Problem Relation Age of Onset   • Diabetes Family         Mellitus   • Hypertension Family    • Hypertension Mother    • Diabetes Father    • ALS Father    • Lung cancer Maternal Grandfather 79   • Alcohol abuse Maternal Grandfather    • Ovarian cancer Paternal Grandmother 61   • Cancer Paternal Grandmother         Ovarian   • Melanoma Maternal Uncle         age unknown   • No Known Problems Sister    • No Known Problems Paternal Grandfather    • No Known Problems Maternal Aunt    • No Known Problems Maternal Aunt    • No Known Problems Paternal Aunt    • No Known Problems Maternal Grandmother        Meds/Allergies       Current Outpatient Medications:   •  citalopram (CeleXA) 20 mg tablet  •  linaCLOtide (Linzess) 145 MCG CAPS  •  mupirocin (BACTROBAN) 2 % ointment  •  Sodium Fluoride 5000 PPM 1 1 % PSTE  •  traMADol (ULTRAM) 50 mg tablet    No Known Allergies        Objective     Blood pressure 116/80, temperature 98 3 °F (36 8 °C), temperature source Tympanic, height 5' 3" (1 6 m), weight 59 1 kg (130 lb 6 4 oz), not currently breastfeeding  Body mass index is 23 1 kg/m²  PHYSICAL EXAM:      General Appearance:   Alert, cooperative, no distress   HEENT:   Normocephalic, atraumatic, anicteric      Neck:  Supple, symmetrical, trachea midline   Lungs:   Clear to auscultation bilaterally; no rales, rhonchi or wheezing; respirations unlabored    Heart[de-identified]   Regular rate and rhythm; no murmur, rub, or gallop  Abdomen:   Soft, non-tender, non-distended; normal bowel sounds; no masses, no organomegaly    Genitalia:   Deferred    Rectal:   Deferred    Extremities:  No cyanosis, clubbing or edema    Pulses:  2+ and symmetric    Skin:  No jaundice, rashes, or lesions    Lymph nodes:  No palpable cervical lymphadenopathy        Lab Results:   No visits with results within 1 Day(s) from this visit     Latest known visit with results is:   Office Visit on 11/07/2022   Component Date Value   • RESULT ALL_PRESC MEDS SP* 81/86/3872 Not Applicable    • Amphetamine Quantificati* 11/07/2022 negative    • Methamphetamine Quantifi* 11/07/2022 negative    • Butalbital Quantification 11/07/2022 negative    • Phenobarbital Quantifica* 11/07/2022 negative    • Secobarbital Quantificat* 11/07/2022 negative    • Temazepam Quantification 11/07/2022 negative    • Oxazepam Quantification 11/07/2022 negative    • Alpha-Hydroxyalprazolam * 11/07/2022 negative    • 7-Amino-Clonazepam Quant* 11/07/2022 negative    • Nordiazepam Quantificati* 11/07/2022 negative    • Temazepam Quantification 11/07/2022 negative • Oxazepam Quantification 11/07/2022 negative    • Lorazepam Quantification 11/07/2022 negative    • Oxazepam Quantification 11/07/2022 negative    • Gabapentin Quantification 11/07/2022 negative    • PREGABALIN QUANTIFICATION 11/07/2022 negative    • Cocaine metabolite Quant* 11/07/2022 negative    • 6-JANELLE (Heroin metabolite* 11/07/2022 negative    • Buprenorphine Quantifica* 11/07/2022 negative    • Norbuprenorphine Quantif* 11/07/2022 negative    • Dextrorphan (Dextrometho* 11/07/2022 negative    • Meperidine Quantification 11/07/2022 negative    • Normeperidine Quantifica* 11/07/2022 negative    • Naltrexone Quantification 11/07/2022 negative    • NALTREXOL (NALTREXONE ME* 11/07/2022 negative    • Fentanyl Quantification 11/07/2022 negative    • Norfentanyl Quantificati* 11/07/2022 negative    • 4-ANPP Quantification 11/07/2022 Fen Neg    • Acetyl fentanyl Quantifi* 11/07/2022 Fen Neg    • Acetyl norfentanyl Quant* 11/07/2022 Fen Neg    • Acryl fentanyl Quantific* 11/07/2022 Fen Neg    • Carfentanil Quantificati* 11/07/2022 Fen Neg    • Para-fluorofentanyl Tirso* 11/07/2022 Fen Neg    • Methadone Quantification 11/07/2022 negative    • EDDP (Methadone metaboli* 11/07/2022 negative    • Oxycodone Quantification 11/07/2022 negative    • Noroxycodone Quantificat* 11/07/2022 negative    • Oxymorphone Quantificati* 11/07/2022 negative    • Oxymorphone Quantificati* 11/07/2022 negative    • Tapentadol Quantification 11/07/2022 negative    • cTHC (Marijuana metaboli* 11/07/2022 negative    • Tramadol Quantification 11/07/2022 positive-> 10099    • O-desmethyl-tramadol Gen* 11/07/2022 positive-[id number]    • N-DESMETHYL-TRAMADOL GEN* 11/07/2022 positive-> 96523    • Codeine Quantification 11/07/2022 negative    • Morphine Quantification 11/07/2022 negative    • Hydrocodone Quantificati* 11/07/2022 negative    • Norhydrocodone Quantific* 11/07/2022 negative    • Hydromorphone Quantifica* 11/07/2022 negative    • Julia Mar QUANTIFICATION 11/07/2022 negative    • METHYLONE QUANTIFICATION 11/07/2022 negative    • Ethyl Glucuronide Quanti* 11/07/2022 negative    • Ethyl Sulfate Quantifica* 11/07/2022 negative    • Mitragynine (Kratom adriana* 11/07/2022 negative    • 5-SC-Mfwibmpvbvv (Kratom* 11/07/2022 negative    • 5F-ADB-M7 11/07/2022 negative    • YI-RYOQCFZR-O1 METABOLIT* 11/07/2022 negative    • EVFY-PVKRWAFT-J7 METABOL* 11/07/2022 negative    • RYW962 metabolite Quanti* 11/07/2022 negative    • LND856 metabolite Quanti* 11/07/2022 negative    • RCS4 METABOLITE QUANTIFI* 11/07/2022 negative    • HTL14/ METABOLITE Q* 11/07/2022 negative    • Methylphenidate Quantifi* 11/07/2022 negative    • RITALINIC ACID QUANTIFIC* 11/07/2022 negative    • PHENTERMINE QUANTIFICATI* 11/07/2022 negative    • OXIDANT 11/07/2022 normal-0    • CREATININE 11/07/2022 normal-163 4    • pH 11/07/2022 normal-5 4    • SPECIFIC GRAVITY URINE 11/07/2022 normal-1 017          Radiology Results:   No results found

## 2023-02-11 DIAGNOSIS — K58.2 IRRITABLE BOWEL SYNDROME WITH BOTH CONSTIPATION AND DIARRHEA: ICD-10-CM

## 2023-02-11 DIAGNOSIS — K59.09 CHRONIC CONSTIPATION: ICD-10-CM

## 2023-02-11 RX ORDER — LINACLOTIDE 145 UG/1
CAPSULE, GELATIN COATED ORAL
Qty: 90 CAPSULE | Refills: 1 | Status: SHIPPED | OUTPATIENT
Start: 2023-02-11

## 2023-02-15 ENCOUNTER — ANESTHESIA EVENT (OUTPATIENT)
Dept: ANESTHESIOLOGY | Facility: HOSPITAL | Age: 43
End: 2023-02-15

## 2023-02-15 ENCOUNTER — HOSPITAL ENCOUNTER (OUTPATIENT)
Dept: ULTRASOUND IMAGING | Facility: HOSPITAL | Age: 43
Discharge: HOME | End: 2023-02-15

## 2023-02-15 ENCOUNTER — ANESTHESIA (OUTPATIENT)
Dept: ANESTHESIOLOGY | Facility: HOSPITAL | Age: 43
End: 2023-02-15

## 2023-02-15 DIAGNOSIS — K59.09 CHRONIC CONSTIPATION: ICD-10-CM

## 2023-02-15 DIAGNOSIS — K58.2 IRRITABLE BOWEL SYNDROME WITH BOTH CONSTIPATION AND DIARRHEA: ICD-10-CM

## 2023-02-15 DIAGNOSIS — R10.33 UMBILICAL PAIN: ICD-10-CM

## 2023-02-15 RX ORDER — LINACLOTIDE 145 UG/1
CAPSULE, GELATIN COATED ORAL
Qty: 90 CAPSULE | Refills: 1 | OUTPATIENT
Start: 2023-02-15

## 2023-02-15 RX ORDER — SODIUM CHLORIDE 9 MG/ML
125 INJECTION, SOLUTION INTRAVENOUS CONTINUOUS
Status: CANCELLED | OUTPATIENT
Start: 2023-02-15

## 2023-02-15 NOTE — ANESTHESIA PREPROCEDURE EVALUATION
Procedure:  PRE-OP ONLY    Relevant Problems   ANESTHESIA (within normal limits)      CARDIO (within normal limits)      ENDO (within normal limits)      GI/HEPATIC (within normal limits)      /RENAL (within normal limits)      GYN (within normal limits)      HEMATOLOGY (within normal limits)      MUSCULOSKELETAL   (+) Chronic low back pain   (+) Lumbar spondylosis      NEURO/PSYCH   (+) Chronic low back pain   (+) Mild depression      PULMONARY (within normal limits)             Anesthesia Plan  ASA Score- 2     Anesthesia Type- IV sedation with anesthesia with ASA Monitors  Additional Monitors:   Airway Plan:           Plan Factors-Exercise tolerance (METS): >4 METS  Chart reviewed  Patient summary reviewed  Patient is not a current smoker  Induction- intravenous  Postoperative Plan-     Informed Consent- Anesthetic plan and risks discussed with patient

## 2023-02-16 ENCOUNTER — ANESTHESIA (OUTPATIENT)
Dept: GASTROENTEROLOGY | Facility: MEDICAL CENTER | Age: 43
End: 2023-02-16

## 2023-02-16 ENCOUNTER — ANESTHESIA EVENT (OUTPATIENT)
Dept: GASTROENTEROLOGY | Facility: MEDICAL CENTER | Age: 43
End: 2023-02-16

## 2023-02-16 ENCOUNTER — HOSPITAL ENCOUNTER (OUTPATIENT)
Dept: GASTROENTEROLOGY | Facility: MEDICAL CENTER | Age: 43
Setting detail: OUTPATIENT SURGERY
End: 2023-02-16

## 2023-02-16 VITALS
TEMPERATURE: 98 F | RESPIRATION RATE: 16 BRPM | SYSTOLIC BLOOD PRESSURE: 103 MMHG | HEIGHT: 63 IN | BODY MASS INDEX: 22.15 KG/M2 | HEART RATE: 60 BPM | WEIGHT: 125 LBS | DIASTOLIC BLOOD PRESSURE: 69 MMHG | OXYGEN SATURATION: 99 %

## 2023-02-16 DIAGNOSIS — R10.10 UPPER ABDOMINAL PAIN: ICD-10-CM

## 2023-02-16 LAB
EXT PREGNANCY TEST URINE: NEGATIVE
EXT. CONTROL: NORMAL

## 2023-02-16 RX ORDER — SODIUM CHLORIDE 9 MG/ML
125 INJECTION, SOLUTION INTRAVENOUS CONTINUOUS
Status: DISCONTINUED | OUTPATIENT
Start: 2023-02-16 | End: 2023-02-20 | Stop reason: HOSPADM

## 2023-02-16 RX ORDER — PROPOFOL 10 MG/ML
INJECTION, EMULSION INTRAVENOUS AS NEEDED
Status: DISCONTINUED | OUTPATIENT
Start: 2023-02-16 | End: 2023-02-16

## 2023-02-16 RX ORDER — LIDOCAINE HYDROCHLORIDE 20 MG/ML
INJECTION, SOLUTION EPIDURAL; INFILTRATION; INTRACAUDAL; PERINEURAL AS NEEDED
Status: DISCONTINUED | OUTPATIENT
Start: 2023-02-16 | End: 2023-02-16

## 2023-02-16 RX ADMIN — SODIUM CHLORIDE 125 ML/HR: 0.9 INJECTION, SOLUTION INTRAVENOUS at 08:59

## 2023-02-16 RX ADMIN — PROPOFOL 150 MG: 10 INJECTION, EMULSION INTRAVENOUS at 09:05

## 2023-02-16 RX ADMIN — LIDOCAINE HYDROCHLORIDE 80 MG: 20 INJECTION, SOLUTION EPIDURAL; INFILTRATION; INTRACAUDAL; PERINEURAL at 09:05

## 2023-02-16 NOTE — H&P
History and Physical - [facility]  Evonne Jamil 36 y o  female MRN: [id number]                  HPI: Evonne Jamil is a 36y o  year old female who presents for epigastric discomfort  REVIEW OF SYSTEMS: Per the HPI, and otherwise unremarkable      Historical Information   Past Medical History:   Diagnosis Date   • Anxiety     Last assessed: 10/11/13   • Basal cell carcinoma     on going   • Cancer ([facility] [address] )     Skin -Basal Cell   • Constipation    • Insomnia     Last assessed: 13   • Sacroiliitis ([facility] [address] )     Last assessed: 10/11/13   • Urinary tract infection 2020   • Vitamin D deficiency     Last assessed: 10/11/13     Past Surgical History:   Procedure Laterality Date   •  SECTION      x2 Last assessed: 14   • SKIN LESION EXCISION      Basal cell skin cancer excision at age 12   • SPINE SURGERY      Radiofrequency ablation     Social History   Social History     Substance and Sexual Activity   Alcohol Use Yes   • Alcohol/week: 4 0 standard drinks   • Types: 4 Glasses of wine per week     Social History     Substance and Sexual Activity   Drug Use No     Social History     Tobacco Use   Smoking Status Former   • Packs/day: 0 25   • Years: 10 00   • Pack years: 2 50   • Types: Cigarettes   • Quit date:    • Years since quittin 1   Smokeless Tobacco Former   • Quit date: 2014   Tobacco Comments    QUIT 4 YEARS AGO     Family History   Problem Relation Age of Onset   • Diabetes Family         Mellitus   • Hypertension Family    • Hypertension Mother    • Diabetes Father    • ALS Father    • Lung cancer Maternal Grandfather 79   • Alcohol abuse Maternal Grandfather    • Ovarian cancer Paternal Grandmother 61   • Cancer Paternal Grandmother         Ovarian   • Melanoma Maternal Uncle         age unknown   • No Known Problems Sister    • No Known Problems Paternal Grandfather    • No Known Problems Maternal Aunt    • No Known Problems Maternal Aunt    • No Known Problems Paternal Aunt    • No Known Problems Maternal Grandmother        Meds/Allergies       Current Outpatient Medications:   •  citalopram (CeleXA) 20 mg tablet  •  linaCLOtide (Linzess) 145 MCG CAPS  •  mupirocin (BACTROBAN) 2 % ointment  •  Sodium Fluoride 5000 PPM 1 1 % PSTE  •  traMADol (ULTRAM) 50 mg tablet    No Known Allergies    Objective     There were no vitals taken for this visit  PHYSICAL EXAM    Gen: NAD  Head: NCAT  CV: RRR  CHEST: Clear  ABD: soft, NT/ND  EXT: no edema      ASSESSMENT/PLAN:  This is a 36y o  year old female here for EGD evaluation and she is stable and optimized for her procedure

## 2023-02-16 NOTE — ANESTHESIA PREPROCEDURE EVALUATION
Procedure:  EGD    Relevant Problems   ANESTHESIA (within normal limits)      CARDIO (within normal limits)      ENDO (within normal limits)      GI/HEPATIC (within normal limits)      /RENAL (within normal limits)      GYN (within normal limits)      HEMATOLOGY (within normal limits)      MUSCULOSKELETAL   (+) Chronic low back pain   (+) Lumbar spondylosis      NEURO/PSYCH   (+) Chronic low back pain   (+) Mild depression      PULMONARY (within normal limits)        Physical Exam    Airway    Mallampati score: III  TM Distance: >3 FB  Neck ROM: full     Dental   No notable dental hx     Cardiovascular  Rhythm: regular, Rate: normal, Cardiovascular exam normal    Pulmonary  Pulmonary exam normal Breath sounds clear to auscultation,     Other Findings        Anesthesia Plan  ASA Score- 2     Anesthesia Type- IV sedation with anesthesia with ASA Monitors  Additional Monitors:   Airway Plan:           Plan Factors-Exercise tolerance (METS): >4 METS  Chart reviewed  Patient summary reviewed  Patient is not a current smoker  Induction- intravenous  Postoperative Plan-     Informed Consent- Anesthetic plan and risks discussed with patient

## 2023-02-25 ENCOUNTER — PATIENT MESSAGE (OUTPATIENT)
Dept: FAMILY MEDICINE CLINIC | Facility: CLINIC | Age: 43
End: 2023-02-25

## 2023-02-25 DIAGNOSIS — G89.29 CHRONIC BACK PAIN, UNSPECIFIED BACK LOCATION, UNSPECIFIED BACK PAIN LATERALITY: ICD-10-CM

## 2023-02-25 DIAGNOSIS — M54.9 CHRONIC BACK PAIN, UNSPECIFIED BACK LOCATION, UNSPECIFIED BACK PAIN LATERALITY: ICD-10-CM

## 2023-02-27 RX ORDER — TRAMADOL HYDROCHLORIDE 50 MG/1
TABLET ORAL
Qty: 180 TABLET | Refills: 0 | Status: SHIPPED | OUTPATIENT
Start: 2023-02-27

## 2023-03-03 DIAGNOSIS — F32.A MILD DEPRESSION: ICD-10-CM

## 2023-03-03 RX ORDER — CITALOPRAM 20 MG/1
20 TABLET ORAL DAILY
Qty: 90 TABLET | Refills: 1 | Status: SHIPPED | OUTPATIENT
Start: 2023-03-03

## 2023-03-04 DIAGNOSIS — L01.00 IMPETIGO: ICD-10-CM

## 2023-03-06 ENCOUNTER — OFFICE VISIT (OUTPATIENT)
Dept: FAMILY MEDICINE CLINIC | Facility: CLINIC | Age: 43
End: 2023-03-06

## 2023-03-06 VITALS
WEIGHT: 130 LBS | BODY MASS INDEX: 23.04 KG/M2 | HEART RATE: 88 BPM | DIASTOLIC BLOOD PRESSURE: 70 MMHG | HEIGHT: 63 IN | RESPIRATION RATE: 16 BRPM | TEMPERATURE: 97.7 F | OXYGEN SATURATION: 98 % | SYSTOLIC BLOOD PRESSURE: 110 MMHG

## 2023-03-06 DIAGNOSIS — G89.29 CHRONIC LOW BACK PAIN, UNSPECIFIED BACK PAIN LATERALITY, UNSPECIFIED WHETHER SCIATICA PRESENT: Primary | ICD-10-CM

## 2023-03-06 DIAGNOSIS — M54.50 CHRONIC LOW BACK PAIN, UNSPECIFIED BACK PAIN LATERALITY, UNSPECIFIED WHETHER SCIATICA PRESENT: Primary | ICD-10-CM

## 2023-03-06 DIAGNOSIS — F32.A MILD DEPRESSION: ICD-10-CM

## 2023-03-06 DIAGNOSIS — C44.519 BASAL CELL CARCINOMA OF BACK: ICD-10-CM

## 2023-03-06 DIAGNOSIS — K59.09 CHRONIC CONSTIPATION: ICD-10-CM

## 2023-03-07 NOTE — ASSESSMENT & PLAN NOTE
Longstanding history of intermittent constipation, most likely due to tramadol usage  Since starting Linzess 145, symptoms are much improved  Patient was seen by GI, EGD was negative  (Patient had colonoscopy in 2020, due for repeat in 2024)

## 2023-03-07 NOTE — PROGRESS NOTES
Name: Talha Morales      : 1980      MRN: [id number]  Encounter Provider: Jonatan Morrison DO  Encounter Date: 3/6/2023   Encounter department: [address] [place] [address]     1  Chronic low back pain, unspecified back pain laterality, unspecified whether sciatica present  Assessment & Plan:  Longstanding history of chronic low back pain/sacroiliitis  Patient has been evaluated by pain management and neurosurgery in the past   She is status post rhizotomy  She has tried multiple medications in the past including NSAIDs, muscle relaxers, and Lyrica without benefit  Currently stable on tramadol 50 mg, 6 tablets daily  She denies any side effects  She is updated controlled substance agreement  UDS 2022      2  Basal cell carcinoma of back  Assessment & Plan:  Continue regular 6-month checks with dermatology      3  Chronic constipation  Assessment & Plan:  Longstanding history of intermittent constipation, most likely due to tramadol usage  Since starting Linzess 145, symptoms are much improved  Patient was seen by GI, EGD was negative  (Patient had colonoscopy in , due for repeat in )  4  Mild depression  Assessment & Plan:  Patient continues to do well on citalopram 20 mg daily           Current with mammography  Patient had colonoscopy in  (next due )    3 months, CBC, CMP, TSH prior  Subjective     Patient presents for recheck of chronic medical problems today  Overall she is doing well  Bowels have regulated since starting Linzess  Back Pain  This is a chronic problem  The current episode started more than 1 year ago  The problem occurs daily  The problem is unchanged  The pain is present in the lumbar spine and sacro-iliac  The quality of the pain is described as aching  The pain does not radiate  The pain is at a severity of 8/10  The pain is worse during the night  The symptoms are aggravated by lying down   Stiffness is present in the morning  Pertinent negatives include no abdominal pain, bladder incontinence, bowel incontinence, chest pain, dysuria, fever, headaches, leg pain, numbness, paresis, paresthesias, pelvic pain, perianal numbness, tingling, weakness or weight loss  Review of Systems   Constitutional: Negative for fever and weight loss  Cardiovascular: Negative for chest pain  Gastrointestinal: Negative for abdominal pain and bowel incontinence  Genitourinary: Negative for bladder incontinence, dysuria and pelvic pain  Musculoskeletal: Positive for back pain  Neurological: Negative for tingling, weakness, numbness, headaches and paresthesias         Past Medical History:   Diagnosis Date   • Anxiety     Last assessed: 10/11/13   • Basal cell carcinoma     on going   • Cancer ([facility] )     Skin -Basal Cell   • Constipation    • Insomnia     Last assessed: 13   • Sacroiliitis ([facility] )     Last assessed: 10/11/13   • Urinary tract infection 2020   • Vitamin D deficiency     Last assessed: 10/11/13     Past Surgical History:   Procedure Laterality Date   •  SECTION      x2 Last assessed: 14   • COLONOSCOPY     • SKIN LESION EXCISION      Basal cell skin cancer excision at age 12   • SPINE SURGERY      Radiofrequency ablation     Family History   Problem Relation Age of Onset   • Diabetes Family         Mellitus   • Hypertension Family    • Hypertension Mother    • Diabetes Father    • ALS Father    • Lung cancer Maternal Grandfather 79   • Alcohol abuse Maternal Grandfather    • Ovarian cancer Paternal Grandmother 61   • Cancer Paternal Grandmother         Ovarian   • Melanoma Maternal Uncle         age unknown   • No Known Problems Sister    • No Known Problems Paternal Grandfather    • No Known Problems Maternal Aunt    • No Known Problems Maternal Aunt    • No Known Problems Paternal Aunt    • No Known Problems Maternal Grandmother      Social History     Socioeconomic History   • Marital status: /Civil [employer]     Spouse name: None   • Number of children: None   • Years of education: None   • Highest education level: None   Occupational History   • Occupation: Employed   Tobacco Use   • Smoking status: Former     Packs/day: 0 25     Years: 10 00     Pack years: 2 50     Types: Cigarettes     Quit date:      Years since quittin 1   • Smokeless tobacco: Former     Quit date: 2014   • Tobacco comments:     QUIT 4 YEARS AGO   Vaping Use   • Vaping Use: Never used   Substance and Sexual Activity   • Alcohol use: Not Currently     Alcohol/week: 4 0 standard drinks     Types: 4 Glasses of wine per week   • Drug use: No   • Sexual activity: Yes     Partners: Male     Birth control/protection: Male Sterilization   Other Topics Concern   • None   Social History Narrative    Lives w/spouse    Lives w/children     Social Determinants of Health     Financial Resource Strain: Not on file   Food Insecurity: Not on file   Transportation Needs: Not on file   Physical Activity: Not on file   Stress: Not on file   Social Connections: Not on file   Intimate Partner Violence: Not on file   Housing Stability: Not on file     Current Outpatient Medications on File Prior to Visit   Medication Sig   • citalopram (CeleXA) 20 mg tablet Take 1 tablet (20 mg total) by mouth daily   • linaCLOtide (Linzess) 145 MCG CAPS 1 tab q AM   • mupirocin (BACTROBAN) 2 % ointment Apply topically 3 (three) times a day   • Sodium Fluoride 5000 PPM 1 1 % PSTE BRUSH 2 TIMES A DAY FOR 2 MINUTES WITHOUT RINSING   • traMADol (ULTRAM) 50 mg tablet 1-2 TABS BY MOUTH EVERY 6 HOURS AS NEEDED (MAX 6 TABS DAILY)     No Known Allergies  Immunization History   Administered Date(s) Administered   • Hep B, adult 1998, 1999   • MMR 1998   • Tdap 2015       Objective     /70 (BP Location: Left arm, Patient Position: Sitting, Cuff Size: Adult)   Pulse 88   Temp 97 7 °F (36 5 °C) (Temporal)   Resp 16   Ht 5' 2 99" (1 6 m)   Wt 59 kg (130 lb)   SpO2 98%   BMI 23 03 kg/m²     Physical Exam  Rosaura Nj DO

## 2023-03-07 NOTE — ASSESSMENT & PLAN NOTE
Longstanding history of chronic low back pain/sacroiliitis  Patient has been evaluated by pain management and neurosurgery in the past   She is status post rhizotomy  She has tried multiple medications in the past including NSAIDs, muscle relaxers, and Lyrica without benefit  Currently stable on tramadol 50 mg, 6 tablets daily  She denies any side effects      She is updated controlled substance agreement  UDS November 2022

## 2023-03-24 ENCOUNTER — PATIENT MESSAGE (OUTPATIENT)
Dept: FAMILY MEDICINE CLINIC | Facility: CLINIC | Age: 43
End: 2023-03-24

## 2023-03-24 DIAGNOSIS — G89.29 CHRONIC BACK PAIN, UNSPECIFIED BACK LOCATION, UNSPECIFIED BACK PAIN LATERALITY: ICD-10-CM

## 2023-03-24 DIAGNOSIS — M54.9 CHRONIC BACK PAIN, UNSPECIFIED BACK LOCATION, UNSPECIFIED BACK PAIN LATERALITY: ICD-10-CM

## 2023-03-25 RX ORDER — TRAMADOL HYDROCHLORIDE 50 MG/1
TABLET ORAL
Qty: 180 TABLET | Refills: 0 | Status: SHIPPED | OUTPATIENT
Start: 2023-03-25

## 2023-04-25 DIAGNOSIS — G89.29 CHRONIC BACK PAIN, UNSPECIFIED BACK LOCATION, UNSPECIFIED BACK PAIN LATERALITY: ICD-10-CM

## 2023-04-25 DIAGNOSIS — M54.9 CHRONIC BACK PAIN, UNSPECIFIED BACK LOCATION, UNSPECIFIED BACK PAIN LATERALITY: ICD-10-CM

## 2023-04-25 RX ORDER — TRAMADOL HYDROCHLORIDE 50 MG/1
TABLET ORAL
Qty: 180 TABLET | Refills: 0 | Status: SHIPPED | OUTPATIENT
Start: 2023-04-25

## 2023-05-23 DIAGNOSIS — M54.9 CHRONIC BACK PAIN, UNSPECIFIED BACK LOCATION, UNSPECIFIED BACK PAIN LATERALITY: ICD-10-CM

## 2023-05-23 DIAGNOSIS — G89.29 CHRONIC BACK PAIN, UNSPECIFIED BACK LOCATION, UNSPECIFIED BACK PAIN LATERALITY: ICD-10-CM

## 2023-05-23 RX ORDER — TRAMADOL HYDROCHLORIDE 50 MG/1
TABLET ORAL
Qty: 180 TABLET | Refills: 0 | Status: SHIPPED | OUTPATIENT
Start: 2023-05-23

## 2023-05-23 NOTE — TELEPHONE ENCOUNTER
----- Message from Georgi Stoll sent at 5/23/2023 11:27 AM EDT -----  Regarding: Tramadol Refill   Contact: [phone]  Luis Charles,      This message is to request a refill for my Tramadol 50 mg  Please send my refill for my prescription to [facility] in [facility]   [address] [place] [address]         Thank you,   Georgi Stoll   [phone]  Showing 1 of 1

## 2023-06-22 ENCOUNTER — OFFICE VISIT (OUTPATIENT)
Dept: FAMILY MEDICINE CLINIC | Facility: CLINIC | Age: 43
End: 2023-06-22
Payer: COMMERCIAL

## 2023-06-22 ENCOUNTER — TELEPHONE (OUTPATIENT)
Dept: ADMINISTRATIVE | Facility: OTHER | Age: 43
End: 2023-06-22

## 2023-06-22 VITALS
HEIGHT: 64 IN | WEIGHT: 128.2 LBS | SYSTOLIC BLOOD PRESSURE: 94 MMHG | TEMPERATURE: 98.2 F | OXYGEN SATURATION: 99 % | DIASTOLIC BLOOD PRESSURE: 62 MMHG | HEART RATE: 75 BPM | BODY MASS INDEX: 21.89 KG/M2

## 2023-06-22 DIAGNOSIS — Z13.0 SCREENING FOR DEFICIENCY ANEMIA: ICD-10-CM

## 2023-06-22 DIAGNOSIS — K59.09 CHRONIC CONSTIPATION: ICD-10-CM

## 2023-06-22 DIAGNOSIS — Z11.59 NEED FOR HEPATITIS C SCREENING TEST: ICD-10-CM

## 2023-06-22 DIAGNOSIS — M54.50 CHRONIC LOW BACK PAIN, UNSPECIFIED BACK PAIN LATERALITY, UNSPECIFIED WHETHER SCIATICA PRESENT: Primary | ICD-10-CM

## 2023-06-22 DIAGNOSIS — F32.A MILD DEPRESSION: ICD-10-CM

## 2023-06-22 DIAGNOSIS — Z13.1 SCREENING FOR DIABETES MELLITUS: ICD-10-CM

## 2023-06-22 DIAGNOSIS — G89.29 CHRONIC BACK PAIN, UNSPECIFIED BACK LOCATION, UNSPECIFIED BACK PAIN LATERALITY: ICD-10-CM

## 2023-06-22 DIAGNOSIS — Z13.29 SCREENING FOR THYROID DISORDER: ICD-10-CM

## 2023-06-22 DIAGNOSIS — K58.2 IRRITABLE BOWEL SYNDROME WITH BOTH CONSTIPATION AND DIARRHEA: ICD-10-CM

## 2023-06-22 DIAGNOSIS — G89.29 CHRONIC LOW BACK PAIN, UNSPECIFIED BACK PAIN LATERALITY, UNSPECIFIED WHETHER SCIATICA PRESENT: Primary | ICD-10-CM

## 2023-06-22 DIAGNOSIS — C44.519 BASAL CELL CARCINOMA OF BACK: ICD-10-CM

## 2023-06-22 DIAGNOSIS — Z11.4 SCREENING FOR HIV WITHOUT PRESENCE OF RISK FACTORS: ICD-10-CM

## 2023-06-22 DIAGNOSIS — M54.9 CHRONIC BACK PAIN, UNSPECIFIED BACK LOCATION, UNSPECIFIED BACK PAIN LATERALITY: ICD-10-CM

## 2023-06-22 DIAGNOSIS — Z13.220 SCREENING CHOLESTEROL LEVEL: ICD-10-CM

## 2023-06-22 PROCEDURE — 99214 OFFICE O/P EST MOD 30 MIN: CPT | Performed by: FAMILY MEDICINE

## 2023-06-22 RX ORDER — CITALOPRAM HYDROBROMIDE 10 MG/1
10 TABLET ORAL DAILY
Qty: 90 TABLET | Refills: 1 | Status: SHIPPED | OUTPATIENT
Start: 2023-06-22

## 2023-06-22 RX ORDER — TRAMADOL HYDROCHLORIDE 50 MG/1
TABLET ORAL
Qty: 180 TABLET | Refills: 0 | Status: SHIPPED | OUTPATIENT
Start: 2023-06-22

## 2023-06-22 NOTE — TELEPHONE ENCOUNTER
----- Message from Edgardo Fuller sent at 6/22/2023  9:27 AM EDT -----  Regarding: Care Gap Request  06/22/23 9:27 AM    Hello, our patient Alba Low has had Pap Smear (HPV) aka Cervical Cancer Screening completed/performed  Please assist in updating the patient chart by pulling the Care Everywhere (CE) document  The date of service is 9/6/22       Thank you,  Adan Arriaza PG [facility]

## 2023-06-22 NOTE — PROGRESS NOTES
Assessment/Plan     Problem List Items Addressed This Visit     Chronic low back pain - Primary     Longstanding history of chronic low back pain/sacroiliitis  Patient has been evaluated by pain management and neurosurgery in the past   She is status post rhizotomy  She has tried multiple medications in the past including NSAIDs, muscle relaxers, and Lyrica without benefit  Her pain is fairly well controlled on tramadol 50 mg, 6 tablets daily  She denies any side effects  PDMP checked  No red flags  UDS November 2022  No inconsistencies  Updated controlled substance agreement today         Basal cell carcinoma of back     History of multiple basal cell carcinomas  Continue regular follow-up with dermatologist as directed  Practice good sun precautions         Chronic constipation     Longstanding history of opioid-induced chronic constipation  Patient had recent work-up in ER due to abdominal pain  Including CT abdomen pelvis  No significant findings  Was getting good results with Linzess 145, but this was symptoms causing the loose stools so she discontinued med  She has been doing pretty well with watching her diet  Irritable bowel syndrome with both constipation and diarrhea     (See also chronic constipation)  Patient was taking Linzess 145 with good results, but was sometimes giving her loose stools  She has since discontinued this and her symptoms have remained stable with watching her diet  Mild depression     Has been doing well on citalopram   She recently decreased her dosage to 10 mg daily and continues to do well    Med was refilled today         Relevant Medications    citalopram (CeleXA) 10 mg tablet   Other Visit Diagnoses     Screening for deficiency anemia        Relevant Orders    CBC and differential    Screening for diabetes mellitus        Relevant Orders    Comprehensive metabolic panel    Screening for thyroid disorder        Relevant Orders    TSH, 3rd generation with Free T4 reflex    Need for hepatitis C screening test        Relevant Orders    Hepatitis C antibody    Screening for HIV without presence of risk factors        Relevant Orders    : HIV 1/2 AB/AG w Reflex [facility] for 2 yr old and above    Chronic back pain, unspecified back location, unspecified back pain laterality        Relevant Medications    traMADol (ULTRAM) 50 mg tablet    Screening cholesterol level        Relevant Orders    Lipid Panel with Direct LDL reflex             Opiate risks  There are risks associated with opioid medications, including dependence, addiction and tolerance  The patient understands and agrees to use these medications only as prescribed  Potential side effects of the medications include, but are not limited to, constipation, drowsiness, addiction, impaired judgment and risk of fatal overdose if not taken as prescribed  The patient was warned against driving while taking sedation medications  Sharing medications is a felony  At this point in time, the patient is showing no signs of addiction, abuse, diversion or suicidal ideation  Opioid agreement  Pain management agreement was reviewed with patient and signed/updated during visit      PDMP review  [place] or [place]  reviewed  No red flags were identified; safe to proceed with prescription      discussing diagnostic results, discussing prognosis, risks and benefits of treatment options, instructions for management and importance of treatment compliance           3 months, fasting blood work prior      Subjective     Opioid Management:   Type of visit: Follow-up    Pain related diagnoses: chronic low back pain    Screening Tools/Assessments:    PHQ-2/9:  Last PHQ-2 score: 0 (Last PHQ-2 date: 12/13/2021)      Drug Screen:  Date of last drug screen: 11/10/2022  Drug screen result based off current prescriptions: consistent    Opioid agreement:  Active Opioid agreement on file?: No    Opioid agreement signed date: "4/25/2022  Opioid agreement expiration date: 4/25/2023    Naloxone:  Currently prescribed Naloxone (Narcan): No      Patient presents for recheck of chronic medical problems  Compliant with prescribed medications  Pain Medications             citalopram (CeleXA) 10 mg tablet Take 1 tablet (10 mg total) by mouth daily    traMADol (ULTRAM) 50 mg tablet 1-2 TABS BY MOUTH EVERY 6 HOURS AS NEEDED (MAX 6 TABS DAILY)         Outpatient Morphine Milligram Equivalents Per Day     6/22/23 and after Unknown    Order Name Dose Route Frequency Maximum MME/Day     traMADol (ULTRAM) 50 mg tablet     Unknown    Total Potential Morphine Milligram Equivalents Per Day Unknown    An error was encountered while attempting to calculate the morphine milligram equivalents per day  Calculation Information        traMADol (ULTRAM) 50 mg tablet    Not enough information to calculate morphine milligram equivalents per day  PDMP Review       Value Time User    PDMP Reviewed  Yes 6/22/2023 10:41 AM Cristobal Cervantes, DO         Review of Systems   Respiratory: Negative  Cardiovascular: Negative  Gastrointestinal: Negative  Genitourinary: Negative  Musculoskeletal: Positive for back pain  Objective     BP 94/62   Pulse 75   Temp 98 2 °F (36 8 °C)   Ht 5' 3 78\" (1 62 m)   Wt 58 2 kg (128 lb 3 2 oz)   LMP 06/01/2023 (Approximate)   SpO2 99%   BMI 22 16 kg/m²     Physical Exam  Constitutional:       Appearance: She is well-developed  HENT:      Head: Normocephalic and atraumatic  Eyes:      Pupils: Pupils are equal, round, and reactive to light  Cardiovascular:      Rate and Rhythm: Normal rate and regular rhythm  Heart sounds: Normal heart sounds  Pulmonary:      Effort: Pulmonary effort is normal       Breath sounds: Normal breath sounds  Abdominal:      General: Bowel sounds are normal       Palpations: Abdomen is soft     Musculoskeletal:      Cervical back: Normal range of motion " and neck supple  Skin:     General: Skin is warm and dry  Neurological:      Mental Status: She is alert and oriented to person, place, and time  Psychiatric:         Behavior: Behavior normal          Thought Content:  Thought content normal          Judgment: Judgment normal          Lucinda Alvarez DO

## 2023-06-22 NOTE — ASSESSMENT & PLAN NOTE
Longstanding history of opioid-induced chronic constipation  Patient had recent work-up in ER due to abdominal pain  Including CT abdomen pelvis  No significant findings  Was getting good results with Linzess 145, but this was symptoms causing the loose stools so she discontinued med  She has been doing pretty well with watching her diet

## 2023-06-22 NOTE — ASSESSMENT & PLAN NOTE
(See also chronic constipation)  Patient was taking Linzess 145 with good results, but was sometimes giving her loose stools  She has since discontinued this and her symptoms have remained stable with watching her diet

## 2023-06-22 NOTE — ASSESSMENT & PLAN NOTE
Has been doing well on citalopram   She recently decreased her dosage to 10 mg daily and continues to do well    Med was refilled today

## 2023-06-22 NOTE — TELEPHONE ENCOUNTER
----- Message from Edgardo Petit sent at 6/22/2023  9:28 AM EDT -----  Regarding: Care Gap Request  06/22/23 9:28 AM    Hello, our patient Vikas Ross has had HIV completed/performed  Please assist in updating the patient chart by pulling the Care Everywhere (CE) document  The date of service is 12/18/14       Thank you,  Cira Lemus PG [facility]

## 2023-06-22 NOTE — ASSESSMENT & PLAN NOTE
History of multiple basal cell carcinomas  Continue regular follow-up with dermatologist as directed    Practice good sun precautions

## 2023-06-22 NOTE — ASSESSMENT & PLAN NOTE
Longstanding history of chronic low back pain/sacroiliitis  Patient has been evaluated by pain management and neurosurgery in the past   She is status post rhizotomy  She has tried multiple medications in the past including NSAIDs, muscle relaxers, and Lyrica without benefit  Her pain is fairly well controlled on tramadol 50 mg, 6 tablets daily  She denies any side effects  PDMP checked  No red flags  UDS November 2022  No inconsistencies    Updated controlled substance agreement today

## 2023-06-22 NOTE — PATIENT INSTRUCTIONS

## 2023-06-22 NOTE — TELEPHONE ENCOUNTER
Upon review of the In Basket request we were able to locate, review, and update the patient chart as requested for HIV and Pap Smear (HPV) aka Cervical Cancer Screening  Any additional questions or concerns should be emailed to the Practice Liaisons via the appropriate education email address, please do not reply via In Basket      Thank you  Nora Galindo

## 2023-07-20 DIAGNOSIS — M54.9 CHRONIC BACK PAIN, UNSPECIFIED BACK LOCATION, UNSPECIFIED BACK PAIN LATERALITY: ICD-10-CM

## 2023-07-20 DIAGNOSIS — G89.29 CHRONIC BACK PAIN, UNSPECIFIED BACK LOCATION, UNSPECIFIED BACK PAIN LATERALITY: ICD-10-CM

## 2023-07-20 NOTE — TELEPHONE ENCOUNTER
----- Message from Nusrat Bernal sent at 7/20/2023  9:12 AM EDT -----  Regarding: Tramadol Refill  Contact: [phone]  Luis Galo,      This message is to request a refill for my Tramadol 50 mg. Please send my refill for my prescription to [facility] in [facility] [address].       [facility]   [address] [place] [address] Crescencio Causeye   [phone]         Thank you,   Nusrat Bernal   [phone]

## 2023-07-21 RX ORDER — TRAMADOL HYDROCHLORIDE 50 MG/1
TABLET ORAL
Qty: 180 TABLET | Refills: 0 | Status: SHIPPED | OUTPATIENT
Start: 2023-07-21

## 2023-08-15 DIAGNOSIS — M54.9 CHRONIC BACK PAIN, UNSPECIFIED BACK LOCATION, UNSPECIFIED BACK PAIN LATERALITY: ICD-10-CM

## 2023-08-15 DIAGNOSIS — G89.29 CHRONIC BACK PAIN, UNSPECIFIED BACK LOCATION, UNSPECIFIED BACK PAIN LATERALITY: ICD-10-CM

## 2023-08-15 RX ORDER — TRAMADOL HYDROCHLORIDE 50 MG/1
TABLET ORAL
Qty: 180 TABLET | Refills: 0 | Status: SHIPPED | OUTPATIENT
Start: 2023-08-15

## 2023-08-15 NOTE — TELEPHONE ENCOUNTER
----- Message from Miryam Son sent at 8/15/2023  7:50 AM EDT -----  Regarding: Tramadol Refill  Contact: [phone]  Luis Mehta,      This message is to request a refill for my Tramadol 50 mg. Please send my refill for my prescription to [facility] in [facility] [address].       [facility]   [address] [place] [address] Ave   [phone]         Thank you,   Miryam Son   [phone]

## 2023-09-12 DIAGNOSIS — G89.29 CHRONIC BACK PAIN, UNSPECIFIED BACK LOCATION, UNSPECIFIED BACK PAIN LATERALITY: ICD-10-CM

## 2023-09-12 DIAGNOSIS — M54.9 CHRONIC BACK PAIN, UNSPECIFIED BACK LOCATION, UNSPECIFIED BACK PAIN LATERALITY: ICD-10-CM

## 2023-09-12 RX ORDER — TRAMADOL HYDROCHLORIDE 50 MG/1
TABLET ORAL
Qty: 180 TABLET | Refills: 0 | Status: SHIPPED | OUTPATIENT
Start: 2023-09-12

## 2023-09-12 NOTE — TELEPHONE ENCOUNTER
----- Message from Nolvia Owens sent at 9/10/2023 10:05 AM EDT -----  Regarding: Tramadol Refill  Contact: [phone]  Luis Cardenas,      This message is to request a refill for my Tramadol 50 mg. Please send my refill for my prescription to [facility] in [facility] [address].       [facility]   [address] [place] [address] [place]   [phone]         Thank you,   Nolvia Owens   [phone]

## 2023-09-27 ENCOUNTER — OFFICE VISIT (OUTPATIENT)
Dept: FAMILY MEDICINE CLINIC | Facility: CLINIC | Age: 43
End: 2023-09-27
Payer: COMMERCIAL

## 2023-09-27 VITALS
TEMPERATURE: 97.5 F | DIASTOLIC BLOOD PRESSURE: 78 MMHG | HEIGHT: 64 IN | SYSTOLIC BLOOD PRESSURE: 102 MMHG | HEART RATE: 72 BPM | OXYGEN SATURATION: 99 % | BODY MASS INDEX: 21.34 KG/M2 | RESPIRATION RATE: 16 BRPM | WEIGHT: 125 LBS

## 2023-09-27 DIAGNOSIS — M54.50 CHRONIC LOW BACK PAIN, UNSPECIFIED BACK PAIN LATERALITY, UNSPECIFIED WHETHER SCIATICA PRESENT: Primary | ICD-10-CM

## 2023-09-27 DIAGNOSIS — F32.A MILD DEPRESSION: ICD-10-CM

## 2023-09-27 DIAGNOSIS — Z12.31 ENCOUNTER FOR SCREENING MAMMOGRAM FOR MALIGNANT NEOPLASM OF BREAST: ICD-10-CM

## 2023-09-27 DIAGNOSIS — C44.519 BASAL CELL CARCINOMA OF BACK: ICD-10-CM

## 2023-09-27 DIAGNOSIS — K59.09 CHRONIC CONSTIPATION: ICD-10-CM

## 2023-09-27 DIAGNOSIS — K58.2 IRRITABLE BOWEL SYNDROME WITH BOTH CONSTIPATION AND DIARRHEA: ICD-10-CM

## 2023-09-27 DIAGNOSIS — G89.29 CHRONIC LOW BACK PAIN, UNSPECIFIED BACK PAIN LATERALITY, UNSPECIFIED WHETHER SCIATICA PRESENT: Primary | ICD-10-CM

## 2023-09-27 PROCEDURE — 99214 OFFICE O/P EST MOD 30 MIN: CPT | Performed by: FAMILY MEDICINE

## 2023-09-27 NOTE — ASSESSMENT & PLAN NOTE
Longstanding history of chronic low back pain/sacroiliitis. Patient has been evaluated by pain management and neurosurgery in the past.  She is status post rhizotomy. She has tried multiple medications including NSAIDs, muscle relaxers, and pregabalin without benefit. She is fairly well controlled on tramadol 50 mg 6 tablets daily. Denies any side effects. UDS current. No inconsistencies  PDMP checked.   No red flags  Patient is updated controlled substance agreement

## 2023-09-27 NOTE — PATIENT INSTRUCTIONS
Goals of care:  Maximize your health and quality of life by:   · Increasing your level of function and activity  · Decreasing the negative effects of pain on your life  · Minimizing the risks and side effects of medications and ensuring safe use of opioid medication     Ways for you to help meet your goals:  Maintain a healthy lifestyle. This includes proper nutrition, regular physical activity as able, try for 8 hours of sleep per night, use stress reduction strategies, avoid triggers. Risks and side effects of opioid use:  Prescription opioids carry serious risks of addiction and  overdose, especially with prolonged use. An opioid overdose,  often marked by slowed breathing, can cause sudden death. The  use of prescription opioids can have a number of side effects as  well, even when taken as directed:  · Tolerance--meaning you might need to take more of a medication for the same pain relief  · Physical dependence--meaning you have symptoms of withdrawal when a medication is stopped  · Increased sensitivity to pain  · Constipation  · Nausea, vomiting, dry mouth  · Sleepiness and dizziness   · Confusion  · Depression  · Low levels of testosterone that can result in lower sex drive, energy, and strength  · Itching and sweating    If you are prescribed opioids for pain:  · Never take opioids in greater amounts or more often than prescribed. · Help prevent misuse and abuse. - Never sell or share prescription opioids.        - Never use another person’s prescription opioids. · ‡Store prescription opioids in a secure place and out of reach of others (this may include visitors, children, friends, and family). · Safely dispose of unused prescription opioids: Find your community drug take-back program or your pharmacy mail-back program, or flush them down the toilet, following guidance from the Food and Drug Administration (www.fda.gov/Drugs/ResourcesForYou).   · ‡Visit www.cdc.gov/drugoverdose to learn about the risks of opioid abuse and overdose. · If you believe you may be struggling with addiction, tell your health care provider and ask for guidance or call [facility]’s National Helpline at 6-143-972-HCLT.

## 2023-09-27 NOTE — PROGRESS NOTES
Assessment/Plan     Problem List Items Addressed This Visit     Chronic low back pain - Primary     Longstanding history of chronic low back pain/sacroiliitis. Patient has been evaluated by pain management and neurosurgery in the past.  She is status post rhizotomy. She has tried multiple medications including NSAIDs, muscle relaxers, and pregabalin without benefit. She is fairly well controlled on tramadol 50 mg 6 tablets daily. Denies any side effects. UDS current. No inconsistencies  PDMP checked. No red flags  Patient is updated controlled substance agreement         Basal cell carcinoma of back     History of multiple basal cell carcinomas. Continue regular follow-up with dermatologist as directed. Continue sun precautions         Chronic constipation     Some exacerbation lately due to moving houses. Irritable bowel syndrome with both constipation and diarrhea    Mild depression     Doing well on citalopram 10 mg daily        Other Visit Diagnoses     Encounter for screening mammogram for malignant neoplasm of breast        Relevant Orders    Mammo screening bilateral w 3d & cad      Order for mammogram given today    Patient declines flu shot  Patient declines COVID booster    Last tetanus booster 2015    Patient reminded to get fasting blood work done in near future. We will call with results    3 months (will need UDS next appointment)      Opiate risks  There are risks associated with opioid medications, including dependence, addiction and tolerance. The patient understands and agrees to use these medications only as prescribed. Potential side effects of the medications include, but are not limited to, constipation, drowsiness, addiction, impaired judgment and risk of fatal overdose if not taken as prescribed. The patient was warned against driving while taking sedation medications. Sharing medications is a felony.  At this point in time, the patient is showing no signs of addiction, abuse, diversion or suicidal ideation. PDMP review  [place] or [place]  reviewed. No red flags were identified; safe to proceed with prescription      discussing diagnostic results, discussing prognosis, risks and benefits of treatment options, instructions for management, patient and family education and importance of treatment compliance. Subjective     Opioid Management:   Type of visit: Follow-up    Pain related diagnoses: chronic low back pain    Screening Tools/Assessments:    PHQ-2/9:  Last PHQ-2 score: 0 (Last PHQ-2 date: 12/13/2021)    Brief Pain Inventory (BPI):  1) Throughout our lives, most of us have had pain from time to time (such as minor headaches, sprains, and toothaches). Have you had pain other than these everyday kinds of pain today? Yes  2) Where is your pain located? Back  3) Rate your pain at its worst in the last 24 hours: 8  4) Rate your pain at its least in the last 24 hours: 8  5) Rate your average level of pain: 6  6) Rate your pain right now: 7  7) What treatments or medications are you receiving for your pain? 8) In the past 24 hours, how much relief have pain treatments or medication provided?  60%  9) During the past 24 hours, pain has interfered with your:    A) General activity: 4     B) Mood: 4     C) Walking ability: 4     D) Normal work (work outside the home & housework): 4     E) Relations with other people: 4     F) Sleep: 5     G) Enjoyment of life: 2    COMM:  Current COMM Score: 3 (negative, low risk patient)    Drug Screen:  Date of last drug screen: 11/10/2022    Opioid agreement:  Active Opioid agreement on file?: Yes    Opioid agreement signed date: 6/22/2023  Opioid agreement expiration date: 6/22/2024    Naloxone:  Currently prescribed Naloxone (Narcan): No      HPI  Pain Medications             citalopram (CeleXA) 10 mg tablet Take 1 tablet (10 mg total) by mouth daily    traMADol (ULTRAM) 50 mg tablet 1-2 TABS BY MOUTH EVERY 6 HOURS AS NEEDED (MAX 6 TABS DAILY)         Outpatient Morphine Milligram Equivalents Per Day     9/27/23 and after Unknown    Order Name Dose Route Frequency Maximum MME/Day     traMADol (ULTRAM) 50 mg tablet     Unknown    Total Potential Morphine Milligram Equivalents Per Day Unknown    An error was encountered while attempting to calculate the morphine milligram equivalents per day. Calculation Information        traMADol (ULTRAM) 50 mg tablet    Not enough information to calculate morphine milligram equivalents per day. PDMP Review       Value Time User    PDMP Reviewed  Yes 9/12/2023 10:34 PM Portia Davila DO         Review of Systems   Respiratory: Negative. Cardiovascular: Negative. Gastrointestinal: Negative. Genitourinary: Negative. Musculoskeletal: Positive for back pain. Objective     /78 (BP Location: Left arm, Patient Position: Sitting, Cuff Size: Standard)   Pulse 72   Temp 97.5 °F (36.4 °C) (Temporal)   Resp 16   Ht 5' 4" (1.626 m)   Wt 56.7 kg (125 lb)   LMP 09/09/2023 (Exact Date)   SpO2 99%   BMI 21.46 kg/m²     Physical Exam  Vitals and nursing note reviewed. Constitutional:       General: She is not in acute distress. Appearance: She is well-developed. HENT:      Head: Normocephalic and atraumatic. Eyes:      Conjunctiva/sclera: Conjunctivae normal.   Cardiovascular:      Rate and Rhythm: Normal rate and regular rhythm. Heart sounds: No murmur heard. Pulmonary:      Effort: Pulmonary effort is normal. No respiratory distress. Breath sounds: Normal breath sounds. Abdominal:      Palpations: Abdomen is soft. Tenderness: There is no abdominal tenderness. Musculoskeletal:         General: No swelling. Cervical back: Neck supple. Skin:     General: Skin is warm and dry. Capillary Refill: Capillary refill takes less than 2 seconds. Neurological:      Mental Status: She is alert.    Psychiatric:         Mood and Affect: Mood normal.         Lucrecia Renner, DO

## 2023-10-10 DIAGNOSIS — M54.9 CHRONIC BACK PAIN, UNSPECIFIED BACK LOCATION, UNSPECIFIED BACK PAIN LATERALITY: ICD-10-CM

## 2023-10-10 DIAGNOSIS — G89.29 CHRONIC BACK PAIN, UNSPECIFIED BACK LOCATION, UNSPECIFIED BACK PAIN LATERALITY: ICD-10-CM

## 2023-10-10 RX ORDER — TRAMADOL HYDROCHLORIDE 50 MG/1
TABLET ORAL
Qty: 180 TABLET | Refills: 0 | Status: SHIPPED | OUTPATIENT
Start: 2023-10-10

## 2023-10-10 NOTE — TELEPHONE ENCOUNTER
----- Message from Margaret Perez sent at 10/9/2023  7:12 PM EDT -----  Regarding: Tramadol Refill  Contact: [phone]  Luis Graham,      This message is to request a refill for my Tramadol 50 mg. Please send my refill for my prescription to [facility] in [facility] [address].       [facility]   [address] [place] [address] [address]   [phone]         Thank you,   Margaret Perez   [phone]

## 2023-10-23 DIAGNOSIS — L01.00 IMPETIGO: ICD-10-CM

## 2023-11-06 DIAGNOSIS — M54.9 CHRONIC BACK PAIN, UNSPECIFIED BACK LOCATION, UNSPECIFIED BACK PAIN LATERALITY: ICD-10-CM

## 2023-11-06 DIAGNOSIS — G89.29 CHRONIC BACK PAIN, UNSPECIFIED BACK LOCATION, UNSPECIFIED BACK PAIN LATERALITY: ICD-10-CM

## 2023-11-06 RX ORDER — TRAMADOL HYDROCHLORIDE 50 MG/1
TABLET ORAL
Qty: 180 TABLET | Refills: 0 | Status: SHIPPED | OUTPATIENT
Start: 2023-11-06

## 2023-11-10 ENCOUNTER — TELEPHONE (OUTPATIENT)
Age: 43
End: 2023-11-10

## 2023-11-10 NOTE — TELEPHONE ENCOUNTER
BCCx3 recently biopsied at another derm office - she needs to bring path report - advised her to get them before her appt to proceed with proper tx.

## 2023-12-04 DIAGNOSIS — M54.9 CHRONIC BACK PAIN, UNSPECIFIED BACK LOCATION, UNSPECIFIED BACK PAIN LATERALITY: ICD-10-CM

## 2023-12-04 DIAGNOSIS — G89.29 CHRONIC BACK PAIN, UNSPECIFIED BACK LOCATION, UNSPECIFIED BACK PAIN LATERALITY: ICD-10-CM

## 2023-12-04 RX ORDER — TRAMADOL HYDROCHLORIDE 50 MG/1
TABLET ORAL
Qty: 180 TABLET | Refills: 0 | Status: SHIPPED | OUTPATIENT
Start: 2023-12-04

## 2024-01-02 DIAGNOSIS — M54.9 CHRONIC BACK PAIN, UNSPECIFIED BACK LOCATION, UNSPECIFIED BACK PAIN LATERALITY: ICD-10-CM

## 2024-01-02 DIAGNOSIS — G89.29 CHRONIC BACK PAIN, UNSPECIFIED BACK LOCATION, UNSPECIFIED BACK PAIN LATERALITY: ICD-10-CM

## 2024-01-02 RX ORDER — TRAMADOL HYDROCHLORIDE 50 MG/1
TABLET ORAL
Qty: 180 TABLET | Refills: 0 | Status: SHIPPED | OUTPATIENT
Start: 2024-01-02

## 2024-01-06 DIAGNOSIS — F32.A MILD DEPRESSION: ICD-10-CM

## 2024-01-08 RX ORDER — CITALOPRAM HYDROBROMIDE 10 MG/1
10 TABLET ORAL DAILY
Qty: 30 TABLET | Refills: 5 | Status: SHIPPED | OUTPATIENT
Start: 2024-01-08

## 2024-01-11 ENCOUNTER — APPOINTMENT (OUTPATIENT)
Dept: LAB | Facility: CLINIC | Age: 44
End: 2024-01-11
Payer: COMMERCIAL

## 2024-01-11 DIAGNOSIS — Z13.1 SCREENING FOR DIABETES MELLITUS: ICD-10-CM

## 2024-01-11 DIAGNOSIS — Z13.220 SCREENING CHOLESTEROL LEVEL: ICD-10-CM

## 2024-01-11 DIAGNOSIS — Z11.4 SCREENING FOR HIV WITHOUT PRESENCE OF RISK FACTORS: ICD-10-CM

## 2024-01-11 DIAGNOSIS — Z11.59 NEED FOR HEPATITIS C SCREENING TEST: ICD-10-CM

## 2024-01-11 DIAGNOSIS — Z13.0 SCREENING FOR DEFICIENCY ANEMIA: ICD-10-CM

## 2024-01-11 DIAGNOSIS — Z13.29 SCREENING FOR THYROID DISORDER: ICD-10-CM

## 2024-01-11 LAB
ALBUMIN SERPL BCP-MCNC: 4.1 G/DL (ref 3.5–5)
ALP SERPL-CCNC: 60 U/L (ref 34–104)
ALT SERPL W P-5'-P-CCNC: 13 U/L (ref 7–52)
ANION GAP SERPL CALCULATED.3IONS-SCNC: 10 MMOL/L
AST SERPL W P-5'-P-CCNC: 17 U/L (ref 13–39)
BASOPHILS # BLD AUTO: 0.04 THOUSANDS/ÂΜL (ref 0–0.1)
BASOPHILS NFR BLD AUTO: 1 % (ref 0–1)
BILIRUB SERPL-MCNC: 0.55 MG/DL (ref 0.2–1)
BUN SERPL-MCNC: 12 MG/DL (ref 5–25)
CALCIUM SERPL-MCNC: 8.8 MG/DL (ref 8.4–10.2)
CHLORIDE SERPL-SCNC: 106 MMOL/L (ref 96–108)
CHOLEST SERPL-MCNC: 175 MG/DL
CO2 SERPL-SCNC: 24 MMOL/L (ref 21–32)
CREAT SERPL-MCNC: 0.67 MG/DL (ref 0.6–1.3)
EOSINOPHIL # BLD AUTO: 0.05 THOUSAND/ÂΜL (ref 0–0.61)
EOSINOPHIL NFR BLD AUTO: 1 % (ref 0–6)
ERYTHROCYTE [DISTWIDTH] IN BLOOD BY AUTOMATED COUNT: 13.4 % (ref 11.6–15.1)
GFR SERPL CREATININE-BSD FRML MDRD: 107 ML/MIN/1.73SQ M
GLUCOSE P FAST SERPL-MCNC: 91 MG/DL (ref 65–99)
HCT VFR BLD AUTO: 39.5 % (ref 34.8–46.1)
HCV AB SER QL: NORMAL
HDLC SERPL-MCNC: 84 MG/DL
HGB BLD-MCNC: 12.7 G/DL (ref 11.5–15.4)
HIV 1+2 AB+HIV1 P24 AG SERPL QL IA: NORMAL
HIV 2 AB SERPL QL IA: NORMAL
HIV1 AB SERPL QL IA: NORMAL
HIV1 P24 AG SERPL QL IA: NORMAL
IMM GRANULOCYTES # BLD AUTO: 0.02 THOUSAND/UL (ref 0–0.2)
IMM GRANULOCYTES NFR BLD AUTO: 0 % (ref 0–2)
LDLC SERPL CALC-MCNC: 78 MG/DL (ref 0–100)
LYMPHOCYTES # BLD AUTO: 1.51 THOUSANDS/ÂΜL (ref 0.6–4.47)
LYMPHOCYTES NFR BLD AUTO: 24 % (ref 14–44)
MCH RBC QN AUTO: 28.3 PG (ref 26.8–34.3)
MCHC RBC AUTO-ENTMCNC: 32.2 G/DL (ref 31.4–37.4)
MCV RBC AUTO: 88 FL (ref 82–98)
MONOCYTES # BLD AUTO: 0.44 THOUSAND/ÂΜL (ref 0.17–1.22)
MONOCYTES NFR BLD AUTO: 7 % (ref 4–12)
NEUTROPHILS # BLD AUTO: 4.2 THOUSANDS/ÂΜL (ref 1.85–7.62)
NEUTS SEG NFR BLD AUTO: 67 % (ref 43–75)
NRBC BLD AUTO-RTO: 0 /100 WBCS
PLATELET # BLD AUTO: 297 THOUSANDS/UL (ref 149–390)
PMV BLD AUTO: 9.9 FL (ref 8.9–12.7)
POTASSIUM SERPL-SCNC: 4.1 MMOL/L (ref 3.5–5.3)
PROT SERPL-MCNC: 6.1 G/DL (ref 6.4–8.4)
RBC # BLD AUTO: 4.48 MILLION/UL (ref 3.81–5.12)
SODIUM SERPL-SCNC: 140 MMOL/L (ref 135–147)
TRIGL SERPL-MCNC: 65 MG/DL
TSH SERPL DL<=0.05 MIU/L-ACNC: 2.41 UIU/ML (ref 0.45–4.5)
WBC # BLD AUTO: 6.26 THOUSAND/UL (ref 4.31–10.16)

## 2024-01-11 PROCEDURE — 85025 COMPLETE CBC W/AUTO DIFF WBC: CPT

## 2024-01-11 PROCEDURE — 87389 HIV-1 AG W/HIV-1&-2 AB AG IA: CPT

## 2024-01-11 PROCEDURE — 80053 COMPREHEN METABOLIC PANEL: CPT

## 2024-01-11 PROCEDURE — 36415 COLL VENOUS BLD VENIPUNCTURE: CPT

## 2024-01-11 PROCEDURE — 86803 HEPATITIS C AB TEST: CPT

## 2024-01-11 PROCEDURE — 80061 LIPID PANEL: CPT

## 2024-01-11 PROCEDURE — 84443 ASSAY THYROID STIM HORMONE: CPT

## 2024-01-16 ENCOUNTER — TELEPHONE (OUTPATIENT)
Dept: FAMILY MEDICINE CLINIC | Facility: CLINIC | Age: 44
End: 2024-01-16

## 2024-01-29 ENCOUNTER — OFFICE VISIT (OUTPATIENT)
Dept: FAMILY MEDICINE CLINIC | Facility: CLINIC | Age: 44
End: 2024-01-29
Payer: COMMERCIAL

## 2024-01-29 VITALS
SYSTOLIC BLOOD PRESSURE: 100 MMHG | HEIGHT: 63 IN | TEMPERATURE: 98.3 F | OXYGEN SATURATION: 98 % | DIASTOLIC BLOOD PRESSURE: 62 MMHG | BODY MASS INDEX: 23.92 KG/M2 | WEIGHT: 135 LBS | HEART RATE: 80 BPM

## 2024-01-29 DIAGNOSIS — F32.A MILD DEPRESSION: ICD-10-CM

## 2024-01-29 DIAGNOSIS — M54.9 CHRONIC BACK PAIN, UNSPECIFIED BACK LOCATION, UNSPECIFIED BACK PAIN LATERALITY: ICD-10-CM

## 2024-01-29 DIAGNOSIS — G89.29 CHRONIC LOW BACK PAIN, UNSPECIFIED BACK PAIN LATERALITY, UNSPECIFIED WHETHER SCIATICA PRESENT: Primary | ICD-10-CM

## 2024-01-29 DIAGNOSIS — K58.2 IRRITABLE BOWEL SYNDROME WITH BOTH CONSTIPATION AND DIARRHEA: ICD-10-CM

## 2024-01-29 DIAGNOSIS — M54.50 CHRONIC LOW BACK PAIN, UNSPECIFIED BACK PAIN LATERALITY, UNSPECIFIED WHETHER SCIATICA PRESENT: Primary | ICD-10-CM

## 2024-01-29 DIAGNOSIS — K59.09 CHRONIC CONSTIPATION: ICD-10-CM

## 2024-01-29 DIAGNOSIS — G89.29 CHRONIC BACK PAIN, UNSPECIFIED BACK LOCATION, UNSPECIFIED BACK PAIN LATERALITY: ICD-10-CM

## 2024-01-29 DIAGNOSIS — C44.519 BASAL CELL CARCINOMA OF BACK: ICD-10-CM

## 2024-01-29 PROCEDURE — 99214 OFFICE O/P EST MOD 30 MIN: CPT | Performed by: FAMILY MEDICINE

## 2024-01-29 RX ORDER — TRAMADOL HYDROCHLORIDE 50 MG/1
TABLET ORAL
Qty: 180 TABLET | Refills: 0 | Status: SHIPPED | OUTPATIENT
Start: 2024-01-29

## 2024-01-29 NOTE — ASSESSMENT & PLAN NOTE
Longstanding history of chronic low back pain/sacroiliitis.  Patient has been evaluated by pain management and neurosurgery in the past.  She is status post rhizotomy.  She has tried multiple medications including NSAIDs, muscle relaxers, and pregabalin without benefit.  She is fairly well controlled on tramadol 50 mg 6 tablets daily.  Denies any side effects.     UDS collected today  PDMP checked.  No red flags  Patient has updated controlled substance agreement             Insomnia

## 2024-01-29 NOTE — PROGRESS NOTES
Assessment/Plan     Problem List Items Addressed This Visit     Chronic low back pain - Primary     Longstanding history of chronic low back pain/sacroiliitis.  Patient has been evaluated by pain management and neurosurgery in the past.  She is status post rhizotomy.  She has tried multiple medications including NSAIDs, muscle relaxers, and pregabalin without benefit.  She is fairly well controlled on tramadol 50 mg 6 tablets daily.  Denies any side effects.     UDS collected today  PDMP checked.  No red flags  Patient has updated controlled substance agreement             Insomnia               Relevant Orders    [facility] All Prescribed Meds and Special Instructions    Amphetamines, Methamphetamines    Butalbital    Phenobarbital    Secobarbital    Alprazolam    Clonazepam    Diazepam, Temazepam, Oxazepam    Lorazepam    Gabapentin    Pregabalin    Cocaine    Heroin    Buprenorphine    Levorphanol    Meperidine    Naltrexone    Fentanyl    Methadone    Oxycodone    Tapentadol    THC    Tramadol    Codeine, Hydrocodone, Hydropmorphone, Morphine    Bath Salts    Ethyl Glucuronide/Ethyl Sulfate    Kratom    Spice    Methylphenidate    Phentermine    Validity Oxidant    Validity Creatinine    Validity pH    Validity Specific    Xylazine Definitive Test    Basal cell carcinoma of back     Hx multiple basal cell CA. Continue regulare f/u w/ derm         Chronic constipation     Stable. Patient has been trying to eat high fiber foods.          Irritable bowel syndrome with both constipation and diarrhea    Mild depression     Stable on citalopram 10 mg qd        Other Visit Diagnoses     Chronic back pain, unspecified back location, unspecified back pain laterality        Relevant Medications    traMADol (ULTRAM) 50 mg tablet       Lab results from 1/11 reviewed    Pt declines Covid booster  Pt declines flu shot   Last tetanus booster 2015    4 mos      Opiate risks  There are risks associated with opioid medications,  including dependence, addiction and tolerance. The patient understands and agrees to use these medications only as prescribed. Potential side effects of the medications include, but are not limited to, constipation, drowsiness, addiction, impaired judgment and risk of fatal overdose if not taken as prescribed. The patient was warned against driving while taking sedation medications.  Sharing medications is a felony. At this point in time, the patient is showing no signs of addiction, abuse, diversion or suicidal ideation.      Drug screen  Drug screen collected during today's visit      [place] review  [place]. No red flags were identified; safe to proceed with prescription      discussing diagnostic results, discussing prognosis, risks and benefits of treatment options and instructions for management.         Subjective     Opioid Management:   Type of visit: Follow-up    Pain related diagnoses: chronic low back pain    Screening Tools/Assessments:    PHQ-2/9:  PHQ-9 score: 0    Brief Pain Inventory (BPI):  1) Throughout our lives, most of us have had pain from time to time (such as minor headaches, sprains, and toothaches). Have you had pain other than these everyday kinds of pain today? Yes  2) Where is your pain located?  3) Rate your pain at its worst in the last 24 hours: 8  4) Rate your pain at its least in the last 24 hours: 8  5) Rate your average level of pain: 6  6) Rate your pain right now: 7  7) What treatments or medications are you receiving for your pain?  8) In the past 24 hours, how much relief have pain treatments or medication provided? 60%  9) During the past 24 hours, pain has interfered with your:     A) General activity: 4     B) Mood: 4     C) Walking ability: 4     D) Normal work (work outside the home & housework): 4     E) Relations with other people: 4     F) Sleep: 5     G) Enjoyment of life: 2    COMM:  Current COMM Score: 4 (negative, low risk patient)    Opioid  "agreement:  Active Opioid agreement on file?: Yes    Opioid agreement signed date: 6/22/2023  Opioid agreement expiration date: 6/22/2024    Naloxone:  Currently prescribed Naloxone (Narcan): No      HPI  Pain Medications             citalopram (CeleXA) 10 mg tablet TAKE 1 TABLET BY MOUTH EVERY DAY    traMADol (ULTRAM) 50 mg tablet 1-2 TABS BY MOUTH EVERY 6 HOURS AS NEEDED (MAX 6 TABS DAILY)         Outpatient Morphine Milligram Equivalents Per Day     1/29/24 and after Unknown    Order Name Dose Route Frequency Maximum MME/Day     traMADol (ULTRAM) 50 mg tablet     Unknown    Total Potential Morphine Milligram Equivalents Per Day Unknown    An error was encountered while attempting to calculate the morphine milligram equivalents per day.     Calculation Information        traMADol (ULTRAM) 50 mg tablet    Not enough information to calculate morphine milligram equivalents per day.                         PDMP Review       Value Time User    PDMP Reviewed  Yes 1/29/2024  2:21 PM Luis Miguel Arndt DO         Review of Systems   Respiratory: Negative.     Cardiovascular: Negative.    Gastrointestinal: Negative.    Genitourinary: Negative.    Musculoskeletal:  Positive for back pain.     Objective     /62 (BP Location: Left arm, Patient Position: Sitting, Cuff Size: Adult)   Pulse 80   Temp 98.3 °F (36.8 °C)   Ht 5' 3.25\" (1.607 m)   Wt 61.2 kg (135 lb)   LMP 01/15/2024 (Exact Date)   SpO2 98%   BMI 23.73 kg/m²     Physical Exam  Vitals and nursing note reviewed.   Constitutional:       General: She is not in acute distress.     Appearance: She is well-developed.   HENT:      Head: Normocephalic and atraumatic.   Eyes:      Conjunctiva/sclera: Conjunctivae normal.   Cardiovascular:      Rate and Rhythm: Normal rate and regular rhythm.      Heart sounds: No murmur heard.  Pulmonary:      Effort: Pulmonary effort is normal. No respiratory distress.      Breath sounds: Normal breath sounds.   Abdominal:      " Palpations: Abdomen is soft.      Tenderness: There is no abdominal tenderness.   Musculoskeletal:         General: No swelling.      Cervical back: Neck supple.   Skin:     General: Skin is warm and dry.      Capillary Refill: Capillary refill takes less than 2 seconds.   Neurological:      Mental Status: She is alert.   Psychiatric:         Mood and Affect: Mood normal.         Luis Miguel Arndt, DO

## 2024-01-31 ENCOUNTER — OFFICE VISIT (OUTPATIENT)
Dept: OBGYN CLINIC | Facility: MEDICAL CENTER | Age: 44
End: 2024-01-31
Payer: COMMERCIAL

## 2024-01-31 VITALS
HEIGHT: 63 IN | SYSTOLIC BLOOD PRESSURE: 128 MMHG | WEIGHT: 132 LBS | DIASTOLIC BLOOD PRESSURE: 82 MMHG | BODY MASS INDEX: 23.39 KG/M2

## 2024-01-31 DIAGNOSIS — Z01.411 ENCNTR FOR GYN EXAM (GENERAL) (ROUTINE) W ABNORMAL FINDINGS: Primary | ICD-10-CM

## 2024-01-31 DIAGNOSIS — N89.8 VAGINAL DISCHARGE: ICD-10-CM

## 2024-01-31 PROCEDURE — 99213 OFFICE O/P EST LOW 20 MIN: CPT | Performed by: OBSTETRICS & GYNECOLOGY

## 2024-01-31 PROCEDURE — 87510 GARDNER VAG DNA DIR PROBE: CPT | Performed by: OBSTETRICS & GYNECOLOGY

## 2024-01-31 PROCEDURE — 87480 CANDIDA DNA DIR PROBE: CPT | Performed by: OBSTETRICS & GYNECOLOGY

## 2024-01-31 PROCEDURE — 99386 PREV VISIT NEW AGE 40-64: CPT | Performed by: OBSTETRICS & GYNECOLOGY

## 2024-01-31 PROCEDURE — 87660 TRICHOMONAS VAGIN DIR PROBE: CPT | Performed by: OBSTETRICS & GYNECOLOGY

## 2024-01-31 RX ORDER — OMEGA-3S/DHA/EPA/FISH OIL/D3 300MG-1000
400 CAPSULE ORAL DAILY
COMMUNITY

## 2024-01-31 NOTE — PROGRESS NOTES
ASSESSMENT & PLAN: Lucia was seen today for gynecologic exam.    Diagnoses and all orders for this visit:    Encntr for gyn exam (general) (routine) w abnormal findings    Vaginal discharge        1.  Routine well woman exam done today  2.  Pap and HPV:  The patient's pap is up to date.  Pap and cotesting was not done today.  Current ASCCP Guidelines reviewed.   3.  Mammogram was ordered by PCP.  4. The following were reviewed in today's visit: adequate intake of calcium and vitamin D, exercise, and healthy diet.  5. F/u 1 year for next routine GYN exam.  6.  Perimenopausal symptoms: Patient given handout on herbal/alternative treatments..  7.  Vaginal discharge/cramping: Affirm taken.  Will contact patient with results.    CC:  Annual Gynecologic Examination    HPI: Lucia Arita is a 43 y.o.  who presents for annual gynecologic examination.  She has the following concerns: gets her menses regularly, q25 days.  Pt has been having hot flashes. Pt reports some pelvic cramping.  Pt reports some back pain, stomach cramps and discharge.  Last time she had this she was diagnosed with a pelvic infection.  Patient with history of yeast and BV.  No issues with urination and nl BM's.      Health Maintenance:    Patient describes her health as good.  Patient does not have weight concerns.  She exercises mostly during the more months when she can be active outside.  She does wear her seatbelt routinely.    She does not perform regular monthly self breast exams.    She does feel safe at home.   Patients does follow a healthy diet.      Her pap:  nl and neg HPV  Last mammogram:   Last colonoscopy:     Patient Active Problem List   Diagnosis    Chronic low back pain    Lumbar spondylosis    Basal cell carcinoma of back    Insomnia    Lumbar pars defect    Chronic constipation    Irritable bowel syndrome with both constipation and diarrhea    Vitamin D deficiency    Mild depression       Past Medical History:    Diagnosis Date    Anxiety     Last assessed: 10/11/13    Basal cell carcinoma     on going    Cancer (HCC)     Skin -Basal Cell    Constipation     Insomnia     Last assessed: 13    Sacroiliitis (HCC)     Last assessed: 10/11/13    Urinary tract infection 2020    Vitamin D deficiency     Last assessed: 10/11/13       Past Surgical History:   Procedure Laterality Date     SECTION      x2 Last assessed: 14    COLONOSCOPY      HERNIA REPAIR  07/21/15    Dr repaired during csection    SKIN LESION EXCISION      Basal cell skin cancer excision at age 14    SPINE SURGERY      Radiofrequency ablation       Past OB/Gyn History:  Pt does not have menstrual issues.  History of sexually transmitted infection: No.  History of abnormal pap smears: Yes abnormal paps in her early 20's, nl since.  Patient is currently sexually active.  heterosexual. The current method of family planning is vasectomy.    Family History   Problem Relation Age of Onset    Diabetes Family         Mellitus    Hypertension Family     Hypertension Mother     Osteoporosis Mother     Diabetes Father     ALS Father     Alcohol abuse Father     Lung cancer Maternal Grandfather 70    Alcohol abuse Maternal Grandfather     Ovarian cancer Paternal Grandmother 63    Cancer Paternal Grandmother         Ovarian    Melanoma Maternal Uncle         age unknown    No Known Problems Sister     No Known Problems Paternal Grandfather     No Known Problems Maternal Aunt     No Known Problems Maternal Aunt     No Known Problems Paternal Aunt     Deep vein thrombosis Maternal Grandmother        Social History:  Social History     Socioeconomic History    Marital status: /Civil Union     Spouse name: Not on file    Number of children: Not on file    Years of education: Not on file    Highest education level: Not on file   Occupational History    Occupation: Employed   Tobacco Use    Smoking status: Former     Current packs/day: 0.00     Average  packs/day: 0.3 packs/day for 10.0 years (2.5 ttl pk-yrs)     Types: Cigarettes     Start date: 2004     Quit date: 2014     Years since quittin.2     Passive exposure: Never    Smokeless tobacco: Never    Tobacco comments:     QUIT 4 YEARS AGO   Vaping Use    Vaping status: Never Used   Substance and Sexual Activity    Alcohol use: Not Currently     Alcohol/week: 2.0 standard drinks of alcohol     Types: 2 Glasses of wine per week    Drug use: No    Sexual activity: Yes     Partners: Male     Birth control/protection: Male Sterilization   Other Topics Concern    Not on file   Social History Narrative    Lives w/spouse    Lives w/children     Social Determinants of Health     Financial Resource Strain: Not on file   Food Insecurity: Not on file   Transportation Needs: Not on file   Physical Activity: Not on file   Stress: Not on file   Social Connections: Not on file   Intimate Partner Violence: Not on file   Housing Stability: Not on file     Presently lives with kids ages 14 and 9 yo and .  Patient is currently employed [employer].      No Known Allergies      Current Outpatient Medications:     cholecalciferol (VITAMIN D3) 400 units tablet, Take 400 Units by mouth daily, Disp: , Rfl:     citalopram (CeleXA) 10 mg tablet, TAKE 1 TABLET BY MOUTH EVERY DAY, Disp: 30 tablet, Rfl: 5    mupirocin (BACTROBAN) 2 % ointment, APPLY TO AFFECTED AREA 3 TIMES A DAY, Disp: 22 g, Rfl: 0    traMADol (ULTRAM) 50 mg tablet, 1-2 TABS BY MOUTH EVERY 6 HOURS AS NEEDED (MAX 6 TABS DAILY), Disp: 180 tablet, Rfl: 0      Review of Systems  Constitutional :no fever, feels well, no tiredness, no recent weight gain or loss  ENT: no ear ache, no loss of hearing, no nosebleeds or nasal discharge, no sore throat or hoarseness.  Cardiovascular: no complaints of slow or fast heart beat, no chest pain, no palpitations, no leg claudication or lower extremity edema.  Respiratory: no complaints of shortness of shortness of  "breath, no VO  Breasts:no complaints of breast pain, breast lump, or nipple discharge  Gastrointestinal: no complaints of abdominal pain, constipation, nausea, vomiting, or diarrhea or bloody stools  Genitourinary : no complaints of dysuria, incontinence, pelvic pain, no dysmenorrhea, vaginal discharge or abnormal vaginal bleeding and as noted in HPI.  Musculoskeletal: no complaints of arthralgia, no myalgia, no joint swelling or stiffness, no limb pain or swelling.  Integumentary: no complaints of skin rash or lesion, itching or dry skin  Neurological: no complaints of headache, no confusion, no numbness or tingling, no dizziness or fainting    Physical Exam:     /82   Ht 5' 3\" (1.6 m)   Wt 59.9 kg (132 lb)   LMP 01/15/2024 (Exact Date)   BMI 23.38 kg/m²     General: appears stated age, cooperative, alert normal mood and affect   Psychiatric oriented to person, place and time.  Mood and affect normal   Neck: normal, supple,trachea midline, no masses.  Thyroid: normal, no thyromegaly   Heart: regular rate and rhythm, S1, S2 normal, no murmur, click, rub or gallop   Lungs: clear to auscultation bilaterally, no increased work of breathing or signs of respiratory distress   Breasts: normal, no dimpling or skin changes noted, sm- mod f-c changes outer quad b/l   Abdomen: soft, non-tender, without masses or organomegaly   Vulva: normal , no lesions   Vagina: normal , no lesions or dryness, small amount of whitish discharge noted   Urethra: normal   Urethal meatus normal   Bladder Normal, soft, non-tender and no prolapse or masses appreciated   Cervix: normal, no palpable masses    Uterus: normal , non-tender, not enlarged, no palpable masses   Adnexa: normal, non-tender without fullness or masses   Lymphatic Palpation of lymph nodes in neck, axilla, groin and/or other locations: no lymphadenopathy or masses noted   Skin Normal skin turgor and no rashes.  Palpation of skin and subcutaneous tissue normal.      "

## 2024-01-31 NOTE — PATIENT INSTRUCTIONS
Thank you for your confidence in our team.   We appreciate you and welcome your feedback.   If you receive a survey from us, please take a few moments to let us know how we are doing.   Sincerely,   Marielle Adamson MD

## 2024-02-01 LAB
4-HYDROXY XYLAZINE: NEGATIVE NG/ML
6MAM UR QL CFM: NEGATIVE NG/ML
7AMINOCLONAZEPAM UR QL CFM: NEGATIVE NG/ML
A-OH ALPRAZ UR QL CFM: NEGATIVE NG/ML
ACCEPTABLE CREAT UR QL: NORMAL MG/DL
ACCEPTIBLE SP GR UR QL: NORMAL
AMPHET UR QL CFM: NEGATIVE NG/ML
BUPRENORPHINE UR QL CFM: NEGATIVE NG/ML
BUTALBITAL UR QL CFM: NEGATIVE NG/ML
BZE UR QL CFM: NEGATIVE NG/ML
CANDIDA RRNA VAG QL PROBE: NOT DETECTED
CODEINE UR QL CFM: NEGATIVE NG/ML
EDDP UR QL CFM: NEGATIVE NG/ML
ETHYL GLUCURONIDE UR QL CFM: NEGATIVE NG/ML
ETHYL SULFATE UR QL SCN: NEGATIVE NG/ML
EUTYLONE UR QL: NEGATIVE NG/ML
FENTANYL UR QL CFM: NEGATIVE NG/ML
G VAGINALIS RRNA GENITAL QL PROBE: NOT DETECTED
GLIADIN IGG SER IA-ACNC: NEGATIVE NG/ML
HYDROCODONE UR QL CFM: NEGATIVE NG/ML
HYDROMORPHONE UR QL CFM: NEGATIVE NG/ML
LORAZEPAM UR QL CFM: NEGATIVE NG/ML
ME-PHENIDATE UR QL CFM: NEGATIVE NG/ML
MEPERIDINE UR QL CFM: NEGATIVE NG/ML
METHADONE UR QL CFM: NEGATIVE NG/ML
METHAMPHET UR QL CFM: NEGATIVE NG/ML
MORPHINE UR QL CFM: NEGATIVE NG/ML
NALTREXONE UR QL CFM: NEGATIVE NG/ML
NITRITE UR QL: NORMAL UG/ML
NORBUPRENORPHINE UR QL CFM: NEGATIVE NG/ML
NORDIAZEPAM UR QL CFM: NEGATIVE NG/ML
NORFENTANYL UR QL CFM: NEGATIVE NG/ML
NORHYDROCODONE UR QL CFM: NEGATIVE NG/ML
NORMEPERIDINE UR QL CFM: NEGATIVE NG/ML
NOROXYCODONE UR QL CFM: NEGATIVE NG/ML
OXAZEPAM UR QL CFM: NEGATIVE NG/ML
OXYCODONE UR QL CFM: NEGATIVE NG/ML
OXYMORPHONE UR QL CFM: NEGATIVE NG/ML
PARA-FLUOROFENTANYL QUANTIFICATION: NORMAL NG/ML
PHENOBARB UR QL CFM: NEGATIVE NG/ML
RESULT ALL_PRESCRIBED MEDS AND SPECIAL INSTRUCTIONS: NORMAL
SECOBARBITAL UR QL CFM: NEGATIVE NG/ML
SL AMB 4-ANPP QUANTIFICATION: NORMAL NG/ML
SL AMB 5F-ADB-M7 METABOLITE QUANTIFICATION: NEGATIVE NG/ML
SL AMB 7-OH-MITRAGYNINE (KRATOM ALKALOID) QUANTIFICATION: NEGATIVE NG/ML
SL AMB AB-FUBINACA-M3 METABOLITE QUANTIFICATION: NEGATIVE NG/ML
SL AMB ACETYL FENTANYL QUANTIFICATION: NORMAL NG/ML
SL AMB ACETYL NORFENTANYL QUANTIFICATION: NORMAL NG/ML
SL AMB ACRYL FENTANYL QUANTIFICATION: NORMAL NG/ML
SL AMB CARFENTANIL QUANTIFICATION: NORMAL NG/ML
SL AMB CTHC (MARIJUANA METABOLITE) QUANTIFICATION: NEGATIVE NG/ML
SL AMB DEXTRORPHAN (DEXTROMETHORPHAN METABOLITE) QUANT: NEGATIVE NG/ML
SL AMB GABAPENTIN QUANTIFICATION: NEGATIVE
SL AMB JWH018 METABOLITE QUANTIFICATION: NEGATIVE NG/ML
SL AMB JWH073 METABOLITE QUANTIFICATION: NEGATIVE NG/ML
SL AMB MDMB-FUBINACA-M1 METABOLITE QUANTIFICATION: NEGATIVE NG/ML
SL AMB METHYLONE QUANTIFICATION: NEGATIVE NG/ML
SL AMB N-DESMETHYL-TRAMADOL QUANTIFICATION: NORMAL NG/ML
SL AMB PHENTERMINE QUANTIFICATION: NEGATIVE NG/ML
SL AMB PREGABALIN QUANTIFICATION: NEGATIVE
SL AMB RCS4 METABOLITE QUANTIFICATION: NEGATIVE NG/ML
SL AMB RITALINIC ACID QUANTIFICATION: NEGATIVE NG/ML
SMOOTH MUSCLE AB TITR SER IF: NEGATIVE NG/ML
SPECIMEN DRAWN SERPL: NEGATIVE NG/ML
SPECIMEN PH ACCEPTABLE UR: NORMAL
T VAGINALIS RRNA GENITAL QL PROBE: NOT DETECTED
TAPENTADOL UR QL CFM: NEGATIVE NG/ML
TEMAZEPAM UR QL CFM: NEGATIVE NG/ML
TRAMADOL UR QL CFM: NORMAL NG/ML
URATE/CREAT 24H UR: NORMAL NG/ML
XYLAZINE QUANTIFICATION: NEGATIVE NG/ML

## 2024-02-07 DIAGNOSIS — F32.A MILD DEPRESSION: ICD-10-CM

## 2024-02-07 RX ORDER — CITALOPRAM HYDROBROMIDE 10 MG/1
10 TABLET ORAL DAILY
Qty: 90 TABLET | Refills: 1 | Status: SHIPPED | OUTPATIENT
Start: 2024-02-07

## 2024-02-27 DIAGNOSIS — M54.9 CHRONIC BACK PAIN, UNSPECIFIED BACK LOCATION, UNSPECIFIED BACK PAIN LATERALITY: ICD-10-CM

## 2024-02-27 DIAGNOSIS — G89.29 CHRONIC BACK PAIN, UNSPECIFIED BACK LOCATION, UNSPECIFIED BACK PAIN LATERALITY: ICD-10-CM

## 2024-02-27 RX ORDER — TRAMADOL HYDROCHLORIDE 50 MG/1
TABLET ORAL
Qty: 180 TABLET | Refills: 0 | Status: SHIPPED | OUTPATIENT
Start: 2024-02-27

## 2024-03-27 DIAGNOSIS — M54.9 CHRONIC BACK PAIN, UNSPECIFIED BACK LOCATION, UNSPECIFIED BACK PAIN LATERALITY: ICD-10-CM

## 2024-03-27 DIAGNOSIS — G89.29 CHRONIC BACK PAIN, UNSPECIFIED BACK LOCATION, UNSPECIFIED BACK PAIN LATERALITY: ICD-10-CM

## 2024-03-27 RX ORDER — TRAMADOL HYDROCHLORIDE 50 MG/1
TABLET ORAL
Qty: 180 TABLET | Refills: 0 | Status: SHIPPED | OUTPATIENT
Start: 2024-03-27

## 2024-03-27 NOTE — TELEPHONE ENCOUNTER
Medication: tramadol    Dose/Frequency: 50 mg; 1-2 tabs every 6 hrs PRN    Quantity: 180    Pharmacy: [facility]    Office:   [x] PCP/Provider -   [] Speciality/Provider -     Does the patient have enough for 3 days?   [x] Yes   [] No - Send as HP to POD

## 2024-04-24 NOTE — ASSESSMENT & PLAN NOTE
History of multiple basal cell carcinomas. Continue regular follow-up with dermatologist as directed.   Continue sun precautions Pt reluctant to leave ED, each time pt was told she was discharged she would roll over and go back to sleep. Pt roused and belongings given back to pt and escorted to lobby by security. Cab arranged to [address] per pt request.

## 2024-04-26 DIAGNOSIS — G89.29 CHRONIC BACK PAIN, UNSPECIFIED BACK LOCATION, UNSPECIFIED BACK PAIN LATERALITY: ICD-10-CM

## 2024-04-26 DIAGNOSIS — M54.9 CHRONIC BACK PAIN, UNSPECIFIED BACK LOCATION, UNSPECIFIED BACK PAIN LATERALITY: ICD-10-CM

## 2024-04-26 RX ORDER — TRAMADOL HYDROCHLORIDE 50 MG/1
TABLET ORAL
Qty: 180 TABLET | Refills: 0 | Status: SHIPPED | OUTPATIENT
Start: 2024-04-26

## 2024-05-24 DIAGNOSIS — M54.9 CHRONIC BACK PAIN, UNSPECIFIED BACK LOCATION, UNSPECIFIED BACK PAIN LATERALITY: ICD-10-CM

## 2024-05-24 DIAGNOSIS — G89.29 CHRONIC BACK PAIN, UNSPECIFIED BACK LOCATION, UNSPECIFIED BACK PAIN LATERALITY: ICD-10-CM

## 2024-05-24 RX ORDER — TRAMADOL HYDROCHLORIDE 50 MG/1
TABLET ORAL
Qty: 180 TABLET | Refills: 0 | Status: SHIPPED | OUTPATIENT
Start: 2024-05-24

## 2024-05-28 ENCOUNTER — TELEPHONE (OUTPATIENT)
Dept: FAMILY MEDICINE CLINIC | Facility: CLINIC | Age: 44
End: 2024-05-28

## 2024-05-29 ENCOUNTER — OFFICE VISIT (OUTPATIENT)
Dept: FAMILY MEDICINE CLINIC | Facility: CLINIC | Age: 44
End: 2024-05-29
Payer: COMMERCIAL

## 2024-05-29 VITALS
SYSTOLIC BLOOD PRESSURE: 90 MMHG | BODY MASS INDEX: 22.2 KG/M2 | OXYGEN SATURATION: 97 % | TEMPERATURE: 98.9 F | DIASTOLIC BLOOD PRESSURE: 70 MMHG | HEIGHT: 64 IN | HEART RATE: 70 BPM | WEIGHT: 130 LBS

## 2024-05-29 DIAGNOSIS — G89.29 CHRONIC LOW BACK PAIN, UNSPECIFIED BACK PAIN LATERALITY, UNSPECIFIED WHETHER SCIATICA PRESENT: Primary | ICD-10-CM

## 2024-05-29 DIAGNOSIS — C44.519 BASAL CELL CARCINOMA OF BACK: ICD-10-CM

## 2024-05-29 DIAGNOSIS — M54.50 CHRONIC LOW BACK PAIN, UNSPECIFIED BACK PAIN LATERALITY, UNSPECIFIED WHETHER SCIATICA PRESENT: Primary | ICD-10-CM

## 2024-05-29 DIAGNOSIS — F32.A MILD DEPRESSION: ICD-10-CM

## 2024-05-29 DIAGNOSIS — K59.09 CHRONIC CONSTIPATION: ICD-10-CM

## 2024-05-29 PROCEDURE — 99214 OFFICE O/P EST MOD 30 MIN: CPT | Performed by: FAMILY MEDICINE

## 2024-05-29 NOTE — PROGRESS NOTES
Ambulatory Visit  Name: Lucia Arita      : 1980      MRN: [id number]  Encounter Provider: Luis Miguel Arndt DO  Encounter Date: 2024   Encounter department: [facility]    Assessment & Plan   1. Chronic low back pain, unspecified back pain laterality, unspecified whether sciatica present  Assessment & Plan:  Longstanding history of chronic low back pain/sacroiliitis.  Previously been evaluated by neurosurgery and pain management.  She is status post rhizotomy.  She has tried multiple medications in the past including NSAIDs, muscle relaxers, and pregabalin without benefit.  She continues to do fairly well on tramadol 50 mg, 6 tablets daily.  She denies any side effects.    UDS 2024.  No inconsistencies  PDMP checked.  No red flags  Patient has updated controlled substance agreement  2. Basal cell carcinoma of back  Assessment & Plan:  History of multiple basal cell carcinomas.  Patient is looking for a new dermatologist.  Referral order given for [facility] dermatology  Orders:  -     Ambulatory Referral to Dermatology; Future  3. Chronic constipation  Assessment & Plan:  Secondary to long-term tramadol use.  Has been doing well on Dulcolax.  4. Mild depression  Assessment & Plan:  Stable/doing well on citalopram 10 mg daily       Last labs 2024.  Will repeat in 1 year    3 months        History of Present Illness     Patient presents for recheck of chronic medical problems.  Overall she has been stable.  Doing well on current meds.  Last labs 2024      Review of Systems   Respiratory: Negative.     Cardiovascular: Negative.    Gastrointestinal: Negative.    Genitourinary: Negative.    Musculoskeletal:  Positive for back pain.     Past Medical History:   Diagnosis Date   • Anxiety     Last assessed: 10/11/13   • Basal cell carcinoma     on going   • Cancer (HCC)     Skin -Basal Cell   • Constipation    • Insomnia     Last assessed: 13   •  Sacroiliitis (HCC)     Last assessed: 10/11/13   • Urinary tract infection 2020   • Vitamin D deficiency     Last assessed: 10/11/13     Past Surgical History:   Procedure Laterality Date   •  SECTION      x2 Last assessed: 14   • COLONOSCOPY     • HERNIA REPAIR  07/21/15    Dr repaired during csection   • SKIN LESION EXCISION      Basal cell skin cancer excision at age 14   • SPINE SURGERY      Radiofrequency ablation     Family History   Problem Relation Age of Onset   • Diabetes Family         Mellitus   • Hypertension Family    • Hypertension Mother    • Osteoporosis Mother    • Diabetes Father    • ALS Father    • Alcohol abuse Father    • Lung cancer Maternal Grandfather 70   • Alcohol abuse Maternal Grandfather    • Ovarian cancer Paternal Grandmother 63   • Cancer Paternal Grandmother         Ovarian   • Melanoma Maternal Uncle         age unknown   • No Known Problems Sister    • No Known Problems Paternal Grandfather    • No Known Problems Maternal Aunt    • No Known Problems Maternal Aunt    • No Known Problems Paternal Aunt    • Deep vein thrombosis Maternal Grandmother      Social History     Tobacco Use   • Smoking status: Former     Current packs/day: 0.00     Average packs/day: 0.3 packs/day for 10.0 years (2.5 ttl pk-yrs)     Types: Cigarettes     Start date: 2004     Quit date: 2014     Years since quittin.5     Passive exposure: Never   • Smokeless tobacco: Former     Quit date: 2014   • Tobacco comments:     QUIT 4 YEARS AGO   Vaping Use   • Vaping status: Never Used   Substance and Sexual Activity   • Alcohol use: Not Currently     Alcohol/week: 2.0 standard drinks of alcohol     Types: 2 Glasses of wine per week   • Drug use: No   • Sexual activity: Yes     Partners: Male     Birth control/protection: Male Sterilization     Current Outpatient Medications on File Prior to Visit   Medication Sig   • cholecalciferol (VITAMIN D3) 400 units tablet Take 400 Units  "by mouth daily   • citalopram (CeleXA) 10 mg tablet TAKE 1 TABLET BY MOUTH EVERY DAY   • mupirocin (BACTROBAN) 2 % ointment APPLY TO AFFECTED AREA 3 TIMES A DAY   • traMADol (ULTRAM) 50 mg tablet 1-2 TABS BY MOUTH EVERY 6 HOURS AS NEEDED (MAX 6 TABS DAILY)     No Known Allergies  Immunization History   Administered Date(s) Administered   • Hep B, adult 12/17/1998, 05/27/1999   • MMR 12/17/1998   • Tdap 07/01/2015     Objective     BP 90/70 (BP Location: Left arm, Patient Position: Sitting, Cuff Size: Standard)   Pulse 70   Temp 98.9 °F (37.2 °C) (Temporal)   Ht 5' 4\" (1.626 m)   Wt 59 kg (130 lb)   SpO2 97%   BMI 22.31 kg/m²     Physical Exam  Cardiovascular:      Rate and Rhythm: Normal rate and regular rhythm.      Heart sounds: Normal heart sounds.      Comments: Carotids: no bruits  Ext: no edema  Pulmonary:      Effort: Pulmonary effort is normal. No respiratory distress.      Breath sounds: No wheezing or rales.   Psychiatric:         Behavior: Behavior normal.         Thought Content: Thought content normal.       Administrative Statements         "

## 2024-05-29 NOTE — ASSESSMENT & PLAN NOTE
Longstanding history of chronic low back pain/sacroiliitis.  Previously been evaluated by neurosurgery and pain management.  She is status post rhizotomy.  She has tried multiple medications in the past including NSAIDs, muscle relaxers, and pregabalin without benefit.  She continues to do fairly well on tramadol 50 mg, 6 tablets daily.  She denies any side effects.    UDS February 2024.  No inconsistencies  PDMP checked.  No red flags  Patient has updated controlled substance agreement

## 2024-05-29 NOTE — ASSESSMENT & PLAN NOTE
History of multiple basal cell carcinomas.  Patient is looking for a new dermatologist.  Referral order given for [facility]

## 2024-06-14 DIAGNOSIS — N92.6 IRREGULAR MENSES: Primary | ICD-10-CM

## 2024-06-20 ENCOUNTER — PATIENT MESSAGE (OUTPATIENT)
Dept: FAMILY MEDICINE CLINIC | Facility: CLINIC | Age: 44
End: 2024-06-20

## 2024-06-20 DIAGNOSIS — G89.29 CHRONIC BACK PAIN, UNSPECIFIED BACK LOCATION, UNSPECIFIED BACK PAIN LATERALITY: ICD-10-CM

## 2024-06-20 DIAGNOSIS — M54.9 CHRONIC BACK PAIN, UNSPECIFIED BACK LOCATION, UNSPECIFIED BACK PAIN LATERALITY: ICD-10-CM

## 2024-06-20 RX ORDER — TRAMADOL HYDROCHLORIDE 50 MG/1
TABLET ORAL
Qty: 180 TABLET | Refills: 0 | Status: SHIPPED | OUTPATIENT
Start: 2024-06-20

## 2024-06-28 DIAGNOSIS — L01.00 IMPETIGO: ICD-10-CM

## 2024-07-05 ENCOUNTER — PATIENT MESSAGE (OUTPATIENT)
Dept: FAMILY MEDICINE CLINIC | Facility: CLINIC | Age: 44
End: 2024-07-05

## 2024-07-05 DIAGNOSIS — F32.A MILD DEPRESSION: ICD-10-CM

## 2024-07-05 RX ORDER — CITALOPRAM HYDROBROMIDE 10 MG/1
10 TABLET ORAL DAILY
Qty: 90 TABLET | Refills: 1 | Status: SHIPPED | OUTPATIENT
Start: 2024-07-05 | End: 2024-07-05 | Stop reason: SDUPTHER

## 2024-07-05 RX ORDER — CITALOPRAM HYDROBROMIDE 10 MG/1
10 TABLET ORAL DAILY
Qty: 90 TABLET | Refills: 1 | Status: SHIPPED | OUTPATIENT
Start: 2024-07-05

## 2024-07-09 ENCOUNTER — APPOINTMENT (OUTPATIENT)
Dept: LAB | Facility: CLINIC | Age: 44
End: 2024-07-09
Payer: COMMERCIAL

## 2024-07-09 DIAGNOSIS — N92.6 IRREGULAR MENSES: ICD-10-CM

## 2024-07-09 LAB
BASOPHILS # BLD AUTO: 0.06 THOUSANDS/ÂΜL (ref 0–0.1)
BASOPHILS NFR BLD AUTO: 1 % (ref 0–1)
CORTIS AM PEAK SERPL-MCNC: 9.1 UG/DL (ref 6.7–22.6)
EOSINOPHIL # BLD AUTO: 0.08 THOUSAND/ÂΜL (ref 0–0.61)
EOSINOPHIL NFR BLD AUTO: 1 % (ref 0–6)
ERYTHROCYTE [DISTWIDTH] IN BLOOD BY AUTOMATED COUNT: 13.5 % (ref 11.6–15.1)
FERRITIN SERPL-MCNC: 5 NG/ML (ref 11–307)
FSH SERPL-ACNC: 11.8 MIU/ML
HCT VFR BLD AUTO: 39 % (ref 34.8–46.1)
HGB BLD-MCNC: 12.5 G/DL (ref 11.5–15.4)
IMM GRANULOCYTES # BLD AUTO: 0.01 THOUSAND/UL (ref 0–0.2)
IMM GRANULOCYTES NFR BLD AUTO: 0 % (ref 0–2)
IRON SATN MFR SERPL: 15 % (ref 15–50)
IRON SERPL-MCNC: 64 UG/DL (ref 50–212)
LYMPHOCYTES # BLD AUTO: 1.8 THOUSANDS/ÂΜL (ref 0.6–4.47)
LYMPHOCYTES NFR BLD AUTO: 26 % (ref 14–44)
MCH RBC QN AUTO: 27.7 PG (ref 26.8–34.3)
MCHC RBC AUTO-ENTMCNC: 32.1 G/DL (ref 31.4–37.4)
MCV RBC AUTO: 87 FL (ref 82–98)
MONOCYTES # BLD AUTO: 0.51 THOUSAND/ÂΜL (ref 0.17–1.22)
MONOCYTES NFR BLD AUTO: 7 % (ref 4–12)
NEUTROPHILS # BLD AUTO: 4.53 THOUSANDS/ÂΜL (ref 1.85–7.62)
NEUTS SEG NFR BLD AUTO: 65 % (ref 43–75)
NRBC BLD AUTO-RTO: 0 /100 WBCS
PLATELET # BLD AUTO: 325 THOUSANDS/UL (ref 149–390)
PMV BLD AUTO: 9.7 FL (ref 8.9–12.7)
PROLACTIN SERPL-MCNC: 12.12 NG/ML (ref 3.34–26.72)
RBC # BLD AUTO: 4.51 MILLION/UL (ref 3.81–5.12)
TIBC SERPL-MCNC: 429 UG/DL (ref 250–450)
TSH SERPL DL<=0.05 MIU/L-ACNC: 2.12 UIU/ML (ref 0.45–4.5)
UIBC SERPL-MCNC: 365 UG/DL (ref 155–355)
WBC # BLD AUTO: 6.99 THOUSAND/UL (ref 4.31–10.16)

## 2024-07-09 PROCEDURE — 36415 COLL VENOUS BLD VENIPUNCTURE: CPT

## 2024-07-09 PROCEDURE — 83540 ASSAY OF IRON: CPT

## 2024-07-09 PROCEDURE — 85025 COMPLETE CBC W/AUTO DIFF WBC: CPT

## 2024-07-09 PROCEDURE — 84146 ASSAY OF PROLACTIN: CPT

## 2024-07-09 PROCEDURE — 82728 ASSAY OF FERRITIN: CPT

## 2024-07-09 PROCEDURE — 82533 TOTAL CORTISOL: CPT

## 2024-07-09 PROCEDURE — 84443 ASSAY THYROID STIM HORMONE: CPT

## 2024-07-09 PROCEDURE — 83550 IRON BINDING TEST: CPT

## 2024-07-09 PROCEDURE — 82626 DEHYDROEPIANDROSTERONE: CPT

## 2024-07-09 PROCEDURE — 83001 ASSAY OF GONADOTROPIN (FSH): CPT

## 2024-07-12 DIAGNOSIS — E61.1 IRON DEFICIENCY: Primary | ICD-10-CM

## 2024-07-14 LAB — DHEA SERPL-MCNC: 83 NG/DL (ref 31–701)

## 2024-07-22 DIAGNOSIS — G89.29 CHRONIC BACK PAIN, UNSPECIFIED BACK LOCATION, UNSPECIFIED BACK PAIN LATERALITY: ICD-10-CM

## 2024-07-22 DIAGNOSIS — M54.9 CHRONIC BACK PAIN, UNSPECIFIED BACK LOCATION, UNSPECIFIED BACK PAIN LATERALITY: ICD-10-CM

## 2024-07-22 RX ORDER — TRAMADOL HYDROCHLORIDE 50 MG/1
TABLET ORAL
Qty: 180 TABLET | Refills: 0 | Status: SHIPPED | OUTPATIENT
Start: 2024-07-23

## 2024-08-07 ENCOUNTER — OFFICE VISIT (OUTPATIENT)
Dept: FAMILY MEDICINE CLINIC | Facility: CLINIC | Age: 44
End: 2024-08-07
Payer: COMMERCIAL

## 2024-08-07 VITALS
HEART RATE: 85 BPM | OXYGEN SATURATION: 98 % | DIASTOLIC BLOOD PRESSURE: 67 MMHG | WEIGHT: 126.6 LBS | BODY MASS INDEX: 21.61 KG/M2 | HEIGHT: 64 IN | SYSTOLIC BLOOD PRESSURE: 100 MMHG

## 2024-08-07 DIAGNOSIS — G89.29 CHRONIC LOW BACK PAIN, UNSPECIFIED BACK PAIN LATERALITY, UNSPECIFIED WHETHER SCIATICA PRESENT: ICD-10-CM

## 2024-08-07 DIAGNOSIS — C44.519 BASAL CELL CARCINOMA OF BACK: ICD-10-CM

## 2024-08-07 DIAGNOSIS — F32.A MILD DEPRESSION: ICD-10-CM

## 2024-08-07 DIAGNOSIS — N92.4 EXCESSIVE BLEEDING IN PREMENOPAUSAL PERIOD: ICD-10-CM

## 2024-08-07 DIAGNOSIS — E61.1 IRON DEFICIENCY: Primary | ICD-10-CM

## 2024-08-07 DIAGNOSIS — M54.50 CHRONIC LOW BACK PAIN, UNSPECIFIED BACK PAIN LATERALITY, UNSPECIFIED WHETHER SCIATICA PRESENT: ICD-10-CM

## 2024-08-07 DIAGNOSIS — K59.09 CHRONIC CONSTIPATION: ICD-10-CM

## 2024-08-07 PROCEDURE — 99214 OFFICE O/P EST MOD 30 MIN: CPT | Performed by: FAMILY MEDICINE

## 2024-08-07 NOTE — ASSESSMENT & PLAN NOTE
History of longstanding chronic back pain/sacroiliitis.  Has been evaluated by neurosurgery and pain management.  Status post rhizotomy.  She has tried multiple medications in the past including NSAIDs, muscle relaxers, and pregabalin without benefit.  She continues to do well on her current regimen of tramadol 50 mg, 6 tablets daily.  Denies any side effects.    UDS February 2024.  No inconsistencies  PDMP checked.  No red flags  Patient has updated controlled substance agreement

## 2024-08-07 NOTE — ASSESSMENT & PLAN NOTE
Patient with history of iron deficiency.  Labs from July 9 showed hemoglobin of 12.5 with ferritin of 5.  At that time she was started on OTC ferrous sulfate 325.  She states she does not tolerate 2 tablets daily, but is compliant on 1 tab daily.  She states that overall she is starting to feel little bit better and more energy.  Will recheck CBC and iron studies prior to her next appointment in 3 months.

## 2024-08-07 NOTE — PROGRESS NOTES
Ambulatory Visit  Name: Lucia Arita      : 1980      MRN: [id number]  Encounter Provider: Luis Miguel Arndt DO  Encounter Date: 2024   Encounter department: [facility]    Assessment & Plan   1. Iron deficiency  Assessment & Plan:  Patient with history of iron deficiency.  Labs from  showed hemoglobin of 12.5 with ferritin of 5.  At that time she was started on OTC ferrous sulfate 325.  She states she does not tolerate 2 tablets daily, but is compliant on 1 tab daily.  She states that overall she is starting to feel little bit better and more energy.  Will recheck CBC and iron studies prior to her next appointment in 3 months.  2. Chronic low back pain, unspecified back pain laterality, unspecified whether sciatica present  Assessment & Plan:  History of longstanding chronic back pain/sacroiliitis.  Has been evaluated by neurosurgery and pain management.  Status post rhizotomy.  She has tried multiple medications in the past including NSAIDs, muscle relaxers, and pregabalin without benefit.  She continues to do well on her current regimen of tramadol 50 mg, 6 tablets daily.  Denies any side effects.    UDS 2024.  No inconsistencies  PDMP checked.  No red flags  Patient has updated controlled substance agreement  3. Mild depression  Assessment & Plan:  Continue citalopram 10 mg daily  4. Basal cell carcinoma of back  Assessment & Plan:  History of multiple basal cell carcinomas.  Continue follow-up with dermatology as directed  5. Chronic constipation  Assessment & Plan:  Secondary to chronic tramadol use.  Overall has been stable  6. Excessive bleeding in premenopausal period  -     Ambulatory Referral to Obstetrics / Gynecology; Future           3 months, check CBC and iron studies prior      History of Present Illness     Patient presents for recheck medical problems today.  She states she does feel somewhat better since starting OTC ferrous sulfate.  She is  compliant on prescribed medications.  She had labs done on .      Review of Systems   Respiratory: Negative.     Cardiovascular: Negative.    Gastrointestinal: Negative.    Genitourinary: Negative.      Past Medical History:   Diagnosis Date   • Anxiety     Last assessed: 10/11/13   • Basal cell carcinoma     on going   • Cancer (HCC)     Skin -Basal Cell   • Constipation    • Insomnia     Last assessed: 13   • Sacroiliitis (HCC)     Last assessed: 10/11/13   • Urinary tract infection 2020   • Vitamin D deficiency     Last assessed: 10/11/13     Past Surgical History:   Procedure Laterality Date   •  SECTION      x2 Last assessed: 14   • COLONOSCOPY     • HERNIA REPAIR  07/21/15    Dr repaired during csection   • SKIN LESION EXCISION      Basal cell skin cancer excision at age 14   • SPINE SURGERY      Radiofrequency ablation     Family History   Problem Relation Age of Onset   • Diabetes Family         Mellitus   • Hypertension Family    • Hypertension Mother    • Osteoporosis Mother    • Diabetes Father    • ALS Father    • Alcohol abuse Father    • Lung cancer Maternal Grandfather 70   • Alcohol abuse Maternal Grandfather    • Ovarian cancer Paternal Grandmother 63   • Cancer Paternal Grandmother         Ovarian   • Melanoma Maternal Uncle         age unknown   • No Known Problems Sister    • No Known Problems Paternal Grandfather    • No Known Problems Maternal Aunt    • No Known Problems Maternal Aunt    • No Known Problems Paternal Aunt    • Deep vein thrombosis Maternal Grandmother      Social History     Tobacco Use   • Smoking status: Former     Current packs/day: 0.00     Average packs/day: 0.3 packs/day for 10.0 years (2.5 ttl pk-yrs)     Types: Cigarettes     Start date: 2004     Quit date: 2014     Years since quittin.7     Passive exposure: Never   • Smokeless tobacco: Former     Quit date: 2014   • Tobacco comments:     QUIT 4 YEARS AGO   Vaping Use   •  "Vaping status: Never Used   Substance and Sexual Activity   • Alcohol use: Not Currently     Alcohol/week: 2.0 standard drinks of alcohol     Types: 2 Glasses of wine per week   • Drug use: No   • Sexual activity: Yes     Partners: Male     Birth control/protection: Male Sterilization     Current Outpatient Medications on File Prior to Visit   Medication Sig   • cholecalciferol (VITAMIN D3) 400 units tablet Take 400 Units by mouth daily   • citalopram (CeleXA) 10 mg tablet Take 1 tablet (10 mg total) by mouth daily   • mupirocin (BACTROBAN) 2 % ointment APPLY TO AFFECTED AREA 3 TIMES A DAY   • traMADol (ULTRAM) 50 mg tablet 1-2 TABS BY MOUTH EVERY 6 HOURS AS NEEDED (MAX 6 TABS DAILY) Do not start before July 23, 2024.     No Known Allergies  Immunization History   Administered Date(s) Administered   • Hep B, adult 12/17/1998, 05/27/1999   • MMR 12/17/1998   • Tdap 07/01/2015     Objective     /67 (BP Location: Left arm, Patient Position: Sitting, Cuff Size: Adult)   Pulse 85   Ht 5' 4\" (1.626 m)   Wt 57.4 kg (126 lb 9.6 oz)   SpO2 98%   BMI 21.73 kg/m²     Physical Exam  Cardiovascular:      Rate and Rhythm: Normal rate and regular rhythm.      Heart sounds: Normal heart sounds.      Comments: Carotids: no bruits  Ext: no edema  Pulmonary:      Effort: Pulmonary effort is normal. No respiratory distress.      Breath sounds: No wheezing or rales.   Psychiatric:         Behavior: Behavior normal.         Thought Content: Thought content normal.         "

## 2024-08-21 DIAGNOSIS — G89.29 CHRONIC BACK PAIN, UNSPECIFIED BACK LOCATION, UNSPECIFIED BACK PAIN LATERALITY: ICD-10-CM

## 2024-08-21 DIAGNOSIS — M54.9 CHRONIC BACK PAIN, UNSPECIFIED BACK LOCATION, UNSPECIFIED BACK PAIN LATERALITY: ICD-10-CM

## 2024-08-21 RX ORDER — TRAMADOL HYDROCHLORIDE 50 MG/1
TABLET ORAL
Qty: 180 TABLET | Refills: 0 | Status: SHIPPED | OUTPATIENT
Start: 2024-08-21

## 2024-08-28 ENCOUNTER — OFFICE VISIT (OUTPATIENT)
Dept: GASTROENTEROLOGY | Facility: CLINIC | Age: 44
End: 2024-08-28
Payer: COMMERCIAL

## 2024-08-28 VITALS
BODY MASS INDEX: 22.26 KG/M2 | WEIGHT: 130.4 LBS | HEIGHT: 64 IN | TEMPERATURE: 98.4 F | SYSTOLIC BLOOD PRESSURE: 108 MMHG | DIASTOLIC BLOOD PRESSURE: 70 MMHG

## 2024-08-28 DIAGNOSIS — R79.0 LOW FERRITIN: Primary | ICD-10-CM

## 2024-08-28 DIAGNOSIS — K58.1 IRRITABLE BOWEL SYNDROME WITH CONSTIPATION: ICD-10-CM

## 2024-08-28 DIAGNOSIS — Z86.010 PERSONAL HISTORY OF COLONIC POLYPS: ICD-10-CM

## 2024-08-28 PROCEDURE — 99214 OFFICE O/P EST MOD 30 MIN: CPT | Performed by: PHYSICIAN ASSISTANT

## 2024-08-28 NOTE — PROGRESS NOTES
"[facility] - Outpatient Follow-up Note  Lucia Arita 43 y.o. female MRN: [id number]  Encounter: [id number]          ASSESSMENT AND PLAN:      Lucia is a 44 y/o female with IBS-C who presents for follow-up of abdominal pain.      1. Personal history of colon polyps  2. IBS-C  3. Low ferritin   EGD done last year was essentially WNL with duodenal bx negative for celiac and gastric bx negative for H.pylori, she was started on linzess 145 mcg, which she says helped however this was too expensive. She has tried miralax, stool softeners, colace, magnesium, fiber without relief. She says she must take at least 2 dulcolax to get a BM, but only \"small amounts\" are evacuated with this. She says she prefers to try a different OTC agent. Of note, her Hgb is WNL but her ferritin is very low at 5. She denies overt GI bleeding and constitutional symptoms.   -please ensure you are drinking at least 64 ounces of water/day   -start OTC Smooth move tea daily-BID PRN constipation   -EGD and colonoscopy ordered to evaluate for her low iron and rule out H.pylori, ulcer disease, celiac disease, AVMs, malignancy, polyps    ______________________________________________________________________    SUBJECTIVE:  She has tried miralax, stool softeners, colace, magnesium, fiber without relief. She says she must take at least 2 dulcolax to get a BM, but only \"small amounts\" are evacuated with this. She says she prefers to try a different OTC agent.  She denies family hx of colon cancer, unintentional weight loss, fevers, chills, night sweats, abdominal pain, n/v, heartburn, trouble swallowing, diarrhea, bloody or black BM. Pt denies prior abdominal surgical hx. Pt is not on blood thinners.       REVIEW OF SYSTEMS IS OTHERWISE NEGATIVE.      Historical Information   Past Medical History:   Diagnosis Date    Anxiety     Last assessed: 10/11/13    Basal cell carcinoma     on going    Cancer (HCC)     Skin -Basal Cell "    Constipation     Insomnia     Last assessed: 13    Sacroiliitis (HCC)     Last assessed: 10/11/13    Urinary tract infection 2020    Vitamin D deficiency     Last assessed: 10/11/13     Past Surgical History:   Procedure Laterality Date     SECTION      x2 Last assessed: 14    COLONOSCOPY      HERNIA REPAIR  07/21/15    Dr repaired during csection    SKIN LESION EXCISION      Basal cell skin cancer excision at age 14    SPINE SURGERY      Radiofrequency ablation     Social History   Social History     Substance and Sexual Activity   Alcohol Use Not Currently    Alcohol/week: 2.0 standard drinks of alcohol    Types: 2 Glasses of wine per week     Social History     Substance and Sexual Activity   Drug Use No     Social History     Tobacco Use   Smoking Status Former    Current packs/day: 0.00    Average packs/day: 0.3 packs/day for 10.0 years (2.5 ttl pk-yrs)    Types: Cigarettes    Start date: 2004    Quit date: 2014    Years since quittin.8    Passive exposure: Never   Smokeless Tobacco Former    Quit date: 2014   Tobacco Comments    QUIT 4 YEARS AGO     Family History   Problem Relation Age of Onset    Diabetes Family         Mellitus    Hypertension Family     Hypertension Mother     Osteoporosis Mother     Diabetes Father     ALS Father     Alcohol abuse Father     Lung cancer Maternal Grandfather 70    Alcohol abuse Maternal Grandfather     Ovarian cancer Paternal Grandmother 63    Cancer Paternal Grandmother         Ovarian    Melanoma Maternal Uncle         age unknown    No Known Problems Sister     No Known Problems Paternal Grandfather     No Known Problems Maternal Aunt     No Known Problems Maternal Aunt     No Known Problems Paternal Aunt     Deep vein thrombosis Maternal Grandmother        Meds/Allergies       Current Outpatient Medications:     cholecalciferol (VITAMIN D3) 400 units tablet    citalopram (CeleXA) 10 mg tablet    mupirocin (BACTROBAN) 2 %  ointment    traMADol (ULTRAM) 50 mg tablet    No Known Allergies        Objective     not currently breastfeeding. There is no height or weight on file to calculate BMI.      PHYSICAL EXAM:      General Appearance:   Alert, cooperative, no distress   HEENT:   Normocephalic, atraumatic, anicteric.     Neck:  Supple, symmetrical, trachea midline   Lungs:   Clear to auscultation bilaterally; no rales, rhonchi or wheezing; respirations unlabored    Heart::   Regular rate and rhythm; no murmur, rub, or gallop.   Abdomen:   Soft, non-tender, non-distended; normal bowel sounds; no masses, no organomegaly    Genitalia:   Deferred    Rectal:   Deferred    Extremities:  No cyanosis, clubbing or edema    Pulses:  2+ and symmetric    Skin:  No jaundice, rashes, or lesions    Lymph nodes:  No palpable cervical lymphadenopathy        Lab Results:   No visits with results within 1 Day(s) from this visit.   Latest known visit with results is:   Appointment on 07/09/2024   Component Date Value    WBC 07/09/2024 6.99     RBC 07/09/2024 4.51     Hemoglobin 07/09/2024 12.5     Hematocrit 07/09/2024 39.0     MCV 07/09/2024 87     MCH 07/09/2024 27.7     MCHC 07/09/2024 32.1     RDW 07/09/2024 13.5     MPV 07/09/2024 9.7     Platelets 07/09/2024 325     nRBC 07/09/2024 0     Segmented % 07/09/2024 65     Immature Grans % 07/09/2024 0     Lymphocytes % 07/09/2024 26     Monocytes % 07/09/2024 7     Eosinophils Relative 07/09/2024 1     Basophils Relative 07/09/2024 1     Absolute Neutrophils 07/09/2024 4.53     Absolute Immature Grans 07/09/2024 0.01     Absolute Lymphocytes 07/09/2024 1.80     Absolute Monocytes 07/09/2024 0.51     Eosinophils Absolute 07/09/2024 0.08     Basophils Absolute 07/09/2024 0.06     TSH 3RD GENERATON 07/09/2024 2.121     Prolactin 07/09/2024 12.12     Cortisol - AM 07/09/2024 9.1     FSH 07/09/2024 11.8     DHEA 07/09/2024 83     Iron Saturation 07/09/2024 15     TIBC 07/09/2024 429     Iron 07/09/2024 64      UIBC 07/09/2024 365 (H)     Ferritin 07/09/2024 5 (L)          Radiology Results:   No results found.

## 2024-08-28 NOTE — PATIENT INSTRUCTIONS
Please make sure you are drinking at least 64 ounces of water/day   Start using smooth move tea daily-twice/day as needed for constipation     Scheduled date of colonoscopy/EGD (as of today): 10/09/2024  Physician performing colonoscopy: Dr. Mckeon   Location of colonoscopy: [facility]  Bowel prep reviewed with patient: Golytely   Instructions reviewed with patient by: Deborah KAUR   Clearances:  N/A

## 2024-08-29 ENCOUNTER — PATIENT MESSAGE (OUTPATIENT)
Dept: GASTROENTEROLOGY | Facility: CLINIC | Age: 44
End: 2024-08-29

## 2024-08-29 NOTE — TELEPHONE ENCOUNTER
I called patient and rescheduled her hematology consult on 10/30/24 with Dr Weir at the [facility].  Patient is scheduled to see Claudia Barkley on 9/3/24 at 3:20pm at the [facility].  Patient did not mind traveling to be seen for a sooner appointment.

## 2024-09-03 ENCOUNTER — OFFICE VISIT (OUTPATIENT)
Dept: HEMATOLOGY ONCOLOGY | Facility: CLINIC | Age: 44
End: 2024-09-03
Payer: COMMERCIAL

## 2024-09-03 VITALS
BODY MASS INDEX: 22.33 KG/M2 | WEIGHT: 130.8 LBS | DIASTOLIC BLOOD PRESSURE: 72 MMHG | HEART RATE: 72 BPM | OXYGEN SATURATION: 97 % | HEIGHT: 64 IN | TEMPERATURE: 98.4 F | SYSTOLIC BLOOD PRESSURE: 93 MMHG

## 2024-09-03 DIAGNOSIS — R79.0 LOW FERRITIN: Primary | ICD-10-CM

## 2024-09-03 DIAGNOSIS — E53.8 LOW FOLATE: ICD-10-CM

## 2024-09-03 DIAGNOSIS — E53.8 B12 DEFICIENCY: ICD-10-CM

## 2024-09-03 PROCEDURE — 99204 OFFICE O/P NEW MOD 45 MIN: CPT | Performed by: NURSE PRACTITIONER

## 2024-09-03 NOTE — PROGRESS NOTES
[facility]M ON[facility] [place]  [address] [facility] [address]  [phone]  Hematology Ambulatory Consult  Lucia Arita, 1980, [id number]  9/3/2024      Assessment and Plan   1. Low ferritin  2. B12 deficiency  3. Low folate  Patient is a 43-year-old female with a history of anxiety, basal cell carcinoma of the skin, colon polyps who was recently seen by gastroenterology.  She is expected to have a colonoscopy in the near future.  She was found to be iron deficient with an iron saturation of 15% and a ferritin level of 5.  She was referred for further management.  Labs from 7/9/2024 show a normal CBC and differential.  Patient does not report a history of bariatric surgery, heavy menses, or iron deficient diet.  She states she eats iron rich foods including spinach, broccoli, and meats although not much red meat.  We reviewed that iron is in many different foods including all of meats.  Patient has started an oral iron supplement and is tolerating without difficulty.  She does report fatigue and low energy.  We will get updated labs and see her in 2 weeks to review.  We briefly discussed possibility of iron infusions, Venofer but will discuss more once we have updated labs.  Last mammogram was done in 2022, there is an order in the system.    - Ambulatory Referral to Hematology / Oncology  - Vitamin B12; Future  - Iron Panel (Includes Ferritin, Iron Sat%, Iron, and TIBC); Future  - Folate; Future    Patient verbalized understanding and is in agreement to the plan. Patient knows to call the Hematology/Oncology office with any questions and concerns regarding the above.    Barrier(s) to care: None.   The patient is able to self care.    Claudia LUIS  Medical Oncology/Hematology  [facility]    Subjective     Chief Complaint   Patient presents with    Consult       Referring provider    Roxanna Kumar PA-C  [address]    [address]  [place] [address]    History of present illness:   Patient is a 43-year-old female with a history of anxiety, basal cell carcinoma of the skin, colon polyps who was recently seen by gastroenterology.  She is expected to have a colonoscopy in the near future.  She was found to be iron deficient with an iron saturation of 15% and a ferritin level of 5.  She was referred for further management.    24: Clinically stable    Review of Systems   Constitutional:  Negative for activity change, appetite change, fatigue, fever and unexpected weight change.   Respiratory:  Negative for cough and shortness of breath.    Cardiovascular:  Negative for chest pain and leg swelling.   Gastrointestinal:  Negative for abdominal pain, constipation, diarrhea and nausea.   Endocrine: Negative for cold intolerance and heat intolerance.   Musculoskeletal:  Negative for arthralgias and myalgias.   Skin: Negative.    Neurological:  Negative for dizziness, weakness and headaches.   Hematological:  Negative for adenopathy. Does not bruise/bleed easily.     Past Medical History:   Diagnosis Date    Anxiety     Last assessed: 10/11/13    Basal cell carcinoma     on going    Cancer (HCC)     Skin -Basal Cell    Constipation     Insomnia     Last assessed: 13    Sacroiliitis (HCC)     Last assessed: 10/11/13    Urinary tract infection 2020    Vitamin D deficiency     Last assessed: 10/11/13     Past Surgical History:   Procedure Laterality Date     SECTION      x2 Last assessed: 14    COLONOSCOPY      HERNIA REPAIR  07/21/15     repaired during csection    SKIN LESION EXCISION      Basal cell skin cancer excision at age 14    SPINE SURGERY      Radiofrequency ablation     Family History   Problem Relation Age of Onset    Diabetes Family         Mellitus    Hypertension Family     Hypertension Mother     Osteoporosis Mother     Diabetes Father     ALS Father     Alcohol abuse Father     Lung cancer  Maternal Grandfather 70    Alcohol abuse Maternal Grandfather     Ovarian cancer Paternal Grandmother 63    Cancer Paternal Grandmother         Ovarian    Melanoma Maternal Uncle         age unknown    No Known Problems Sister     No Known Problems Paternal Grandfather     No Known Problems Maternal Aunt     No Known Problems Maternal Aunt     No Known Problems Paternal Aunt     Deep vein thrombosis Maternal Grandmother      Social History     Socioeconomic History    Marital status: /Civil Union     Spouse name: None    Number of children: None    Years of education: None    Highest education level: None   Occupational History    Occupation: Employed   Tobacco Use    Smoking status: Former     Current packs/day: 0.00     Average packs/day: 0.3 packs/day for 10.0 years (2.5 ttl pk-yrs)     Types: Cigarettes     Start date: 2004     Quit date: 2014     Years since quittin.8     Passive exposure: Never    Smokeless tobacco: Former     Quit date: 2014    Tobacco comments:     QUIT 4 YEARS AGO   Vaping Use    Vaping status: Never Used   Substance and Sexual Activity    Alcohol use: Not Currently     Alcohol/week: 2.0 standard drinks of alcohol     Types: 2 Glasses of wine per week    Drug use: No    Sexual activity: Yes     Partners: Male     Birth control/protection: Male Sterilization   Other Topics Concern    None   Social History Narrative    Lives w/spouse    Lives w/children     Social Determinants of Health     Financial Resource Strain: Not on file   Food Insecurity: Not on file   Transportation Needs: Not on file   Physical Activity: Not on file   Stress: Not on file   Social Connections: Not on file   Intimate Partner Violence: Not on file   Housing Stability: Not on file     Current Outpatient Medications:     cholecalciferol (VITAMIN D3) 400 units tablet, Take 400 Units by mouth daily, Disp: , Rfl:     citalopram (CeleXA) 10 mg tablet, Take 1 tablet (10 mg total) by mouth daily,  "Disp: 90 tablet, Rfl: 1    mupirocin (BACTROBAN) 2 % ointment, APPLY TO AFFECTED AREA 3 TIMES A DAY, Disp: 30 g, Rfl: 1    traMADol (ULTRAM) 50 mg tablet, 1-2 TABS BY MOUTH EVERY 6 HOURS AS NEEDED (MAX 6 TABS DAILY), Disp: 180 tablet, Rfl: 0    polyethylene glycol (GOLYTELY) 4000 mL solution, Take 4,000 mL by mouth once for 1 dose, Disp: 4000 mL, Rfl: 0  No Known Allergies    Objective   BP 93/72 (BP Location: Left arm, Patient Position: Sitting, Cuff Size: Standard)   Pulse 72   Temp 98.4 °F (36.9 °C) (Temporal)   Ht 5' 4\" (1.626 m)   Wt 59.3 kg (130 lb 12.8 oz)   SpO2 97%   BMI 22.45 kg/m²   Physical Exam  Vitals reviewed.   Constitutional:       Appearance: Normal appearance. She is well-developed.   HENT:      Head: Normocephalic and atraumatic.   Eyes:      Conjunctiva/sclera: Conjunctivae normal.      Pupils: Pupils are equal, round, and reactive to light.   Pulmonary:      Effort: Pulmonary effort is normal. No respiratory distress.   Musculoskeletal:         General: Normal range of motion.      Cervical back: Normal range of motion.   Lymphadenopathy:      Cervical: No cervical adenopathy.   Skin:     General: Skin is dry.   Neurological:      Mental Status: She is alert and oriented to person, place, and time.   Psychiatric:         Behavior: Behavior normal.     Result Review  Labs:  Lab Results   Component Value Date    WBC 6.99 07/09/2024    HGB 12.5 07/09/2024    HCT 39.0 07/09/2024    MCV 87 07/09/2024     07/09/2024     Lab Results   Component Value Date    SODIUM 140 01/11/2024    K 4.1 01/11/2024     01/11/2024    CO2 24 01/11/2024    AGAP 10 01/11/2024    BUN 12 01/11/2024    CREATININE 0.67 01/11/2024    GLUC 93 04/18/2023    GLUF 91 01/11/2024    CALCIUM 8.8 01/11/2024    AST 17 01/11/2024    ALT 13 01/11/2024    ALKPHOS 60 01/11/2024    TP 6.1 (L) 01/11/2024    TBILI 0.55 01/11/2024    EGFR 107 01/11/2024     Imaging:   No results found.    Please note:  This report has been " generated by a voice recognition software system. Therefore there may be syntax, spelling, and/or grammatical errors. Please call if you have any questions.

## 2024-09-04 ENCOUNTER — TELEPHONE (OUTPATIENT)
Dept: GYNECOLOGY | Facility: CLINIC | Age: 44
End: 2024-09-04

## 2024-09-04 ENCOUNTER — APPOINTMENT (OUTPATIENT)
Dept: LAB | Facility: CLINIC | Age: 44
End: 2024-09-04
Payer: COMMERCIAL

## 2024-09-04 DIAGNOSIS — R79.0 LOW FERRITIN: ICD-10-CM

## 2024-09-04 DIAGNOSIS — E53.8 LOW FOLATE: ICD-10-CM

## 2024-09-04 DIAGNOSIS — E53.8 B12 DEFICIENCY: ICD-10-CM

## 2024-09-04 LAB
FERRITIN SERPL-MCNC: 14 NG/ML (ref 11–307)
FOLATE SERPL-MCNC: 14.9 NG/ML
IRON SATN MFR SERPL: 26 % (ref 15–50)
IRON SERPL-MCNC: 97 UG/DL (ref 50–212)
TIBC SERPL-MCNC: 374 UG/DL (ref 250–450)
UIBC SERPL-MCNC: 277 UG/DL (ref 155–355)
VIT B12 SERPL-MCNC: 1050 PG/ML (ref 180–914)

## 2024-09-04 PROCEDURE — 36415 COLL VENOUS BLD VENIPUNCTURE: CPT

## 2024-09-04 PROCEDURE — 82746 ASSAY OF FOLIC ACID SERUM: CPT

## 2024-09-04 PROCEDURE — 82728 ASSAY OF FERRITIN: CPT

## 2024-09-04 PROCEDURE — 83540 ASSAY OF IRON: CPT

## 2024-09-04 PROCEDURE — 82607 VITAMIN B-12: CPT

## 2024-09-04 PROCEDURE — 83550 IRON BINDING TEST: CPT

## 2024-09-04 NOTE — TELEPHONE ENCOUNTER
----- Message from TOBY Stahl sent at 9/4/2024  8:53 AM EDT -----  Regarding: reason for appt  Pls call pt for reason for appt

## 2024-09-16 DIAGNOSIS — G89.29 CHRONIC BACK PAIN, UNSPECIFIED BACK LOCATION, UNSPECIFIED BACK PAIN LATERALITY: ICD-10-CM

## 2024-09-16 DIAGNOSIS — M54.9 CHRONIC BACK PAIN, UNSPECIFIED BACK LOCATION, UNSPECIFIED BACK PAIN LATERALITY: ICD-10-CM

## 2024-09-17 RX ORDER — TRAMADOL HYDROCHLORIDE 50 MG/1
50 TABLET ORAL EVERY 6 HOURS PRN
Qty: 180 TABLET | Refills: 0 | Status: SHIPPED | OUTPATIENT
Start: 2024-09-17

## 2024-09-19 ENCOUNTER — TELEMEDICINE (OUTPATIENT)
Dept: HEMATOLOGY ONCOLOGY | Facility: CLINIC | Age: 44
End: 2024-09-19
Payer: COMMERCIAL

## 2024-09-19 ENCOUNTER — TELEPHONE (OUTPATIENT)
Dept: HEMATOLOGY ONCOLOGY | Facility: CLINIC | Age: 44
End: 2024-09-19

## 2024-09-19 DIAGNOSIS — D50.8 IRON DEFICIENCY ANEMIA SECONDARY TO INADEQUATE DIETARY IRON INTAKE: Primary | ICD-10-CM

## 2024-09-19 DIAGNOSIS — R79.0 LOW FERRITIN: ICD-10-CM

## 2024-09-19 PROCEDURE — 99214 OFFICE O/P EST MOD 30 MIN: CPT | Performed by: NURSE PRACTITIONER

## 2024-09-19 RX ORDER — SODIUM CHLORIDE 9 MG/ML
20 INJECTION, SOLUTION INTRAVENOUS ONCE
OUTPATIENT
Start: 2024-10-02

## 2024-09-19 NOTE — PROGRESS NOTES
Virtual Regular Visit  Name: Lucia Arita      : 1980      MRN: [id number]  Encounter Provider: TOBY Breaux  Encounter Date: 2024   Encounter department[facility]    Verification of patient location:    Patient is located at Home in the following state in which I hold an active license [place]    Assessment & Plan  Iron deficiency anemia secondary to inadequate dietary iron intake    Orders:    CBC; Future    Iron Panel (Includes Ferritin, Iron Sat%, Iron, and TIBC); Future    Low ferritin    Orders:    CBC; Future    Iron Panel (Includes Ferritin, Iron Sat%, Iron, and TIBC); Future    Patient is a 43-year-old female with a history of anxiety, basal cell carcinoma of the skin, colon polyps, and iron deficiency. Patient does not report a history of bariatric surgery, heavy menses, or iron deficient diet. She is tolerating oral iron supplement but still does report fatigue and low energy.   Labs from 2024 show an iron saturation of 26%, ferritin level 14 previously was 5.  Folate 14.9, vitamin B12 1050. Given the ferritin level remains low and she continues to have fatigue we will make arrangements for Venofer 200 mg IV x 5 doses.  I reviewed indications and adverse reactions including shortness of breath, chest heaviness, muscle cramping, headache, itching, Anaphylaxis/allergic reaction, nausea; agrees to proceed with treatment. Patient is advised to contact our office if they has any of the symptoms.  We will repeat labs in 12 weeks with follow-up shortly after.  Patient has a colonoscopy scheduled for early 2024.  Patient verbalized understanding and agrees with the plan.    Encounter provider TOBY Breaux    The patient was identified by name and date of birth. Lucia Arita was informed that this is a telemedicine visit and that the visit is being conducted through the [employer] platform. She agrees to proceed..  My office  door was closed. No one else was in the room.  She acknowledged consent and understanding of privacy and security of the video platform. The patient has agreed to participate and understands they can discontinue the visit at any time.    Patient is aware this is a billable service.     History of Present Illness     Lucia Arita is a 43 y.o. female who presents iron deficiency.     History obtained from : patient  Review of Systems   Constitutional:  Positive for fatigue. Negative for activity change, appetite change, fever and unexpected weight change.   Respiratory:  Negative for cough and shortness of breath.    Cardiovascular:  Negative for chest pain and leg swelling.   Gastrointestinal:  Negative for abdominal pain, constipation, diarrhea and nausea.   Endocrine: Negative for cold intolerance and heat intolerance.   Musculoskeletal:  Negative for arthralgias and myalgias.   Skin: Negative.    Neurological:  Negative for dizziness, weakness and headaches.   Hematological:  Negative for adenopathy. Does not bruise/bleed easily.     Medical History Reviewed by provider this encounter:  Meds       Current Outpatient Medications on File Prior to Visit   Medication Sig Dispense Refill    cholecalciferol (VITAMIN D3) 400 units tablet Take 400 Units by mouth daily      citalopram (CeleXA) 10 mg tablet Take 1 tablet (10 mg total) by mouth daily 90 tablet 1    mupirocin (BACTROBAN) 2 % ointment APPLY TO AFFECTED AREA 3 TIMES A DAY 30 g 1    polyethylene glycol (GOLYTELY) 4000 mL solution Take 4,000 mL by mouth once for 1 dose 4000 mL 0    traMADol (ULTRAM) 50 mg tablet Take 1 tablet (50 mg total) by mouth every 6 (six) hours as needed for moderate pain 1-2 TABS BY MOUTH EVERY 6 HOURS AS NEEDED (MAX 6 TABS DAILY) 180 tablet 0     No current facility-administered medications on file prior to visit.          Objective   Appointment on 09/04/2024   Component Date Value Ref Range Status    Folate 09/04/2024 14.9  >5.9  ng/mL Final    The World Health Organization has determined deficient folate concentrations are considered to be <4.0 ng/mL.    Vitamin B-12 09/04/2024 1,050 (H)  180 - 914 pg/mL Final    Iron Saturation 09/04/2024 26  15 - 50 % Final    TIBC 09/04/2024 374  250 - 450 ug/dL Final    Iron 09/04/2024 97  50 - 212 ug/dL Final    Patients treated with metal-binding drugs (ie. Deferoxamine) may have depressed iron values.    UIBC 09/04/2024 277  155 - 355 ug/dL Final    Ferritin 09/04/2024 14  11 - 307 ng/mL Final       There were no vitals taken for this visit.  Physical Exam  Constitutional:       Appearance: Normal appearance.   HENT:      Head: Normocephalic.   Pulmonary:      Effort: Pulmonary effort is normal. No respiratory distress.   Musculoskeletal:         General: Normal range of motion.      Cervical back: Normal range of motion.   Neurological:      Mental Status: She is oriented to person, place, and time.   Psychiatric:         Behavior: Behavior normal.       Visit Time  Total Visit Duration: 10 min

## 2024-09-19 NOTE — LETTER
2024     Luis Miguel Arndt DO  [address]  [address]  [facility] [address]    Patient: Lucia Arita   YOB: 1980   Date of Visit: 2024       Dear Dr. Arndt:    Thank you for referring Lucia Arita to me for evaluation. Below are my notes for this consultation.    If you have questions, please do not hesitate to call me. I look forward to following your patient along with you.         Sincerely,        TOBY Breaux        CC: No Recipients    TOBY Breaux  2024  8:56 AM  Sign when Signing Visit  Virtual Regular Visit  Name: Lucia Arita      : 1980      MRN: [id number]  Encounter Provider: TOBY Breaux  Encounter Date: 2024   Encounter department: [facility] HEMATOLOGY ONCOLOGY SPECIALISTS [place]    Verification of patient location:    Patient is located at Home in the following state in which I hold an active license [place]    Assessment & Plan  Iron deficiency anemia secondary to inadequate dietary iron intake    Orders:  •  CBC; Future  •  Iron Panel (Includes Ferritin, Iron Sat%, Iron, and TIBC); Future    Low ferritin    Orders:  •  CBC; Future  •  Iron Panel (Includes Ferritin, Iron Sat%, Iron, and TIBC); Future    Patient is a 43-year-old female with a history of anxiety, basal cell carcinoma of the skin, colon polyps, and iron deficiency. Patient does not report a history of bariatric surgery, heavy menses, or iron deficient diet. She is tolerating oral iron supplement but still does report fatigue and low energy.   Labs from 2024 show an iron saturation of 26%, ferritin level 14 previously was 5.  Folate 14.9, vitamin B12 1050. Given the ferritin level remains low and she continues to have fatigue we will make arrangements for Venofer 200 mg IV x 5 doses.  I reviewed indications and adverse reactions including shortness of breath, chest heaviness, muscle cramping, headache, itching, Anaphylaxis/allergic reaction, nausea;  agrees to proceed with treatment. Patient is advised to contact our office if they has any of the symptoms.  We will repeat labs in 12 weeks with follow-up shortly after.  Patient has a colonoscopy scheduled for early October 2024.  Patient verbalized understanding and agrees with the plan.    Encounter provider TOBY Breaux    The patient was identified by name and date of birth. Lucia Arita was informed that this is a telemedicine visit and that the visit is being conducted through the [email or web address] platform. She agrees to proceed..  My office door was closed. No one else was in the room.  She acknowledged consent and understanding of privacy and security of the video platform. The patient has agreed to participate and understands they can discontinue the visit at any time.    Patient is aware this is a billable service.     History of Present Illness    Lucia Arita is a 43 y.o. female who presents iron deficiency.     History obtained from : patient  Review of Systems   Constitutional:  Positive for fatigue. Negative for activity change, appetite change, fever and unexpected weight change.   Respiratory:  Negative for cough and shortness of breath.    Cardiovascular:  Negative for chest pain and leg swelling.   Gastrointestinal:  Negative for abdominal pain, constipation, diarrhea and nausea.   Endocrine: Negative for cold intolerance and heat intolerance.   Musculoskeletal:  Negative for arthralgias and myalgias.   Skin: Negative.    Neurological:  Negative for dizziness, weakness and headaches.   Hematological:  Negative for adenopathy. Does not bruise/bleed easily.     Medical History Reviewed by provider this encounter:  Meds       Current Outpatient Medications on File Prior to Visit   Medication Sig Dispense Refill   • cholecalciferol (VITAMIN D3) 400 units tablet Take 400 Units by mouth daily     • citalopram (CeleXA) 10 mg tablet Take 1 tablet (10 mg total) by mouth daily 90 tablet 1   •  mupirocin (BACTROBAN) 2 % ointment APPLY TO AFFECTED AREA 3 TIMES A DAY 30 g 1   • polyethylene glycol (GOLYTELY) 4000 mL solution Take 4,000 mL by mouth once for 1 dose 4000 mL 0   • traMADol (ULTRAM) 50 mg tablet Take 1 tablet (50 mg total) by mouth every 6 (six) hours as needed for moderate pain 1-2 TABS BY MOUTH EVERY 6 HOURS AS NEEDED (MAX 6 TABS DAILY) 180 tablet 0     No current facility-administered medications on file prior to visit.          Objective  Appointment on 09/04/2024   Component Date Value Ref Range Status   • Folate 09/04/2024 14.9  >5.9 ng/mL Final    The World Health Organization has determined deficient folate concentrations are considered to be <4.0 ng/mL.   • Vitamin B-12 09/04/2024 1,050 (H)  180 - 914 pg/mL Final   • Iron Saturation 09/04/2024 26  15 - 50 % Final   • TIBC 09/04/2024 374  250 - 450 ug/dL Final   • Iron 09/04/2024 97  50 - 212 ug/dL Final    Patients treated with metal-binding drugs (ie. Deferoxamine) may have depressed iron values.   • UIBC 09/04/2024 277  155 - 355 ug/dL Final   • Ferritin 09/04/2024 14  11 - 307 ng/mL Final       There were no vitals taken for this visit.  Physical Exam  Constitutional:       Appearance: Normal appearance.   HENT:      Head: Normocephalic.   Pulmonary:      Effort: Pulmonary effort is normal. No respiratory distress.   Musculoskeletal:         General: Normal range of motion.      Cervical back: Normal range of motion.   Neurological:      Mental Status: She is oriented to person, place, and time.   Psychiatric:         Behavior: Behavior normal.       Visit Time  Total Visit Duration: 10 min

## 2024-09-20 ENCOUNTER — TELEPHONE (OUTPATIENT)
Dept: INFUSION CENTER | Facility: CLINIC | Age: 44
End: 2024-09-20

## 2024-09-20 NOTE — TELEPHONE ENCOUNTER
Patient called to make Venofer appointments. First appointment scheduled for 10/8/24 at 08:00. Discussed location of the infusion center, projected length of appointment, visitor policy, and availability of snacks, drinks, and WiFi. All questions answered.

## 2024-09-25 ENCOUNTER — ANESTHESIA EVENT (OUTPATIENT)
Dept: ANESTHESIOLOGY | Facility: HOSPITAL | Age: 44
End: 2024-09-25

## 2024-09-25 ENCOUNTER — ANESTHESIA (OUTPATIENT)
Dept: ANESTHESIOLOGY | Facility: HOSPITAL | Age: 44
End: 2024-09-25

## 2024-10-03 ENCOUNTER — HOSPITAL ENCOUNTER (OUTPATIENT)
Dept: MAMMOGRAPHY | Facility: MEDICAL CENTER | Age: 44
Discharge: HOME | End: 2024-10-03
Payer: COMMERCIAL

## 2024-10-03 VITALS — WEIGHT: 130 LBS | HEIGHT: 64 IN | BODY MASS INDEX: 22.2 KG/M2

## 2024-10-03 DIAGNOSIS — Z12.31 ENCOUNTER FOR SCREENING MAMMOGRAM FOR MALIGNANT NEOPLASM OF BREAST: ICD-10-CM

## 2024-10-03 PROCEDURE — 77067 SCR MAMMO BI INCL CAD: CPT

## 2024-10-03 PROCEDURE — 77063 BREAST TOMOSYNTHESIS BI: CPT

## 2024-10-07 DIAGNOSIS — Z00.6 ENCOUNTER FOR EXAMINATION FOR NORMAL COMPARISON OR CONTROL IN CLINICAL RESEARCH PROGRAM: ICD-10-CM

## 2024-10-08 ENCOUNTER — HOSPITAL ENCOUNTER (OUTPATIENT)
Dept: INFUSION CENTER | Facility: CLINIC | Age: 44
Discharge: HOME | End: 2024-10-08
Payer: COMMERCIAL

## 2024-10-08 VITALS
TEMPERATURE: 99.6 F | SYSTOLIC BLOOD PRESSURE: 96 MMHG | HEART RATE: 71 BPM | DIASTOLIC BLOOD PRESSURE: 62 MMHG | RESPIRATION RATE: 16 BRPM

## 2024-10-08 DIAGNOSIS — R79.0 LOW FERRITIN: ICD-10-CM

## 2024-10-08 DIAGNOSIS — D50.8 IRON DEFICIENCY ANEMIA SECONDARY TO INADEQUATE DIETARY IRON INTAKE: Primary | ICD-10-CM

## 2024-10-08 PROCEDURE — 96365 THER/PROPH/DIAG IV INF INIT: CPT

## 2024-10-08 RX ORDER — SODIUM CHLORIDE 9 MG/ML
20 INJECTION, SOLUTION INTRAVENOUS ONCE
Status: COMPLETED | OUTPATIENT
Start: 2024-10-08 | End: 2024-10-08

## 2024-10-08 RX ORDER — SODIUM CHLORIDE 9 MG/ML
20 INJECTION, SOLUTION INTRAVENOUS ONCE
Status: CANCELLED | OUTPATIENT
Start: 2024-10-16

## 2024-10-08 RX ADMIN — IRON SUCROSE 200 MG: 20 INJECTION, SOLUTION INTRAVENOUS at 08:36

## 2024-10-08 RX ADMIN — SODIUM CHLORIDE 20 ML/HR: 9 INJECTION, SOLUTION INTRAVENOUS at 08:36

## 2024-10-08 NOTE — PROGRESS NOTES
Pt presents for venofer. No new meds or concerns. Pt tolerated treatment without adverse reaction. Intake assessment completed. Future appointments discussed, confirmed with patient for 10/16/2024 0800. AVS declined.

## 2024-10-09 ENCOUNTER — ANESTHESIA EVENT (OUTPATIENT)
Dept: GASTROENTEROLOGY | Facility: MEDICAL CENTER | Age: 44
End: 2024-10-09
Payer: COMMERCIAL

## 2024-10-09 ENCOUNTER — ANESTHESIA (OUTPATIENT)
Dept: GASTROENTEROLOGY | Facility: MEDICAL CENTER | Age: 44
End: 2024-10-09
Payer: COMMERCIAL

## 2024-10-09 ENCOUNTER — HOSPITAL ENCOUNTER (OUTPATIENT)
Dept: GASTROENTEROLOGY | Facility: MEDICAL CENTER | Age: 44
Setting detail: OUTPATIENT SURGERY
Discharge: HOME | End: 2024-10-09
Payer: COMMERCIAL

## 2024-10-09 VITALS
HEIGHT: 64 IN | SYSTOLIC BLOOD PRESSURE: 100 MMHG | DIASTOLIC BLOOD PRESSURE: 63 MMHG | RESPIRATION RATE: 16 BRPM | TEMPERATURE: 97.6 F | HEART RATE: 68 BPM | BODY MASS INDEX: 22.2 KG/M2 | WEIGHT: 130 LBS | OXYGEN SATURATION: 99 %

## 2024-10-09 DIAGNOSIS — R79.0 LOW FERRITIN: ICD-10-CM

## 2024-10-09 PROCEDURE — 88305 TISSUE EXAM BY PATHOLOGIST: CPT | Performed by: STUDENT IN AN ORGANIZED HEALTH CARE EDUCATION/TRAINING PROGRAM

## 2024-10-09 PROCEDURE — 45385 COLONOSCOPY W/LESION REMOVAL: CPT | Performed by: INTERNAL MEDICINE

## 2024-10-09 PROCEDURE — 43239 EGD BIOPSY SINGLE/MULTIPLE: CPT | Performed by: INTERNAL MEDICINE

## 2024-10-09 RX ORDER — SODIUM CHLORIDE 9 MG/ML
75 INJECTION, SOLUTION INTRAVENOUS CONTINUOUS
Status: DISCONTINUED | OUTPATIENT
Start: 2024-10-09 | End: 2024-10-13 | Stop reason: HOSPADM

## 2024-10-09 RX ORDER — EPHEDRINE SULFATE 50 MG/ML
INJECTION INTRAVENOUS AS NEEDED
Status: DISCONTINUED | OUTPATIENT
Start: 2024-10-09 | End: 2024-10-09

## 2024-10-09 RX ORDER — LIDOCAINE HYDROCHLORIDE 20 MG/ML
INJECTION, SOLUTION EPIDURAL; INFILTRATION; INTRACAUDAL; PERINEURAL AS NEEDED
Status: DISCONTINUED | OUTPATIENT
Start: 2024-10-09 | End: 2024-10-09

## 2024-10-09 RX ORDER — PROPOFOL 10 MG/ML
INJECTION, EMULSION INTRAVENOUS AS NEEDED
Status: DISCONTINUED | OUTPATIENT
Start: 2024-10-09 | End: 2024-10-09

## 2024-10-09 RX ORDER — SODIUM CHLORIDE 9 MG/ML
75 INJECTION, SOLUTION INTRAVENOUS CONTINUOUS
Status: CANCELLED | OUTPATIENT
Start: 2024-10-09

## 2024-10-09 RX ADMIN — EPHEDRINE SULFATE 5 MG: 50 INJECTION INTRAVENOUS at 10:32

## 2024-10-09 RX ADMIN — LIDOCAINE HYDROCHLORIDE 60 MG: 20 INJECTION, SOLUTION EPIDURAL; INFILTRATION; INTRACAUDAL at 10:10

## 2024-10-09 RX ADMIN — PROPOFOL 150 MCG/KG/MIN: 10 INJECTION, EMULSION INTRAVENOUS at 10:11

## 2024-10-09 RX ADMIN — Medication 40 MG: at 10:24

## 2024-10-09 RX ADMIN — PROPOFOL 100 MG: 10 INJECTION, EMULSION INTRAVENOUS at 10:10

## 2024-10-09 RX ADMIN — SODIUM CHLORIDE 75 ML/HR: 0.9 INJECTION, SOLUTION INTRAVENOUS at 10:03

## 2024-10-09 NOTE — H&P
History and Physical - [facility]  Lucia Arita 43 y.o. female MRN: [id number]                  HPI: Lucia Arita is a 43 y.o. year old female who presents for EGD and colonoscopy evaluation      REVIEW OF SYSTEMS: Per the HPI, and otherwise unremarkable.    Historical Information   Past Medical History:   Diagnosis Date    Anxiety     Last assessed: 10/11/13    Basal cell carcinoma     on going    Cancer (HCC)     Skin -Basal Cell    Constipation     Insomnia     Last assessed: 13    Sacroiliitis (HCC)     Last assessed: 10/11/13    Urinary tract infection 2020    Vitamin D deficiency     Last assessed: 10/11/13     Past Surgical History:   Procedure Laterality Date     SECTION      x2 Last assessed: 14    COLONOSCOPY      HERNIA REPAIR  07/21/15    Dr repaired during csection    SKIN LESION EXCISION      Basal cell skin cancer excision at age 14    SPINE SURGERY      Radiofrequency ablation     Social History   Social History     Substance and Sexual Activity   Alcohol Use Not Currently    Alcohol/week: 2.0 standard drinks of alcohol    Types: 2 Glasses of wine per week     Social History     Substance and Sexual Activity   Drug Use No     Social History     Tobacco Use   Smoking Status Former    Current packs/day: 0.00    Average packs/day: 0.3 packs/day for 10.0 years (2.5 ttl pk-yrs)    Types: Cigarettes    Start date: 2004    Quit date: 2014    Years since quittin.9    Passive exposure: Never   Smokeless Tobacco Former    Quit date: 2014   Tobacco Comments    QUIT 4 YEARS AGO     Family History   Problem Relation Age of Onset    Diabetes Family         Mellitus    Hypertension Family     Hypertension Mother     Osteoporosis Mother     Diabetes Father     ALS Father     Alcohol abuse Father     Lung cancer Maternal Grandfather 70    Alcohol abuse Maternal Grandfather     Ovarian cancer Paternal Grandmother 63    Cancer Paternal Grandmother   "       Ovarian    Melanoma Maternal Uncle         age unknown    No Known Problems Sister     No Known Problems Paternal Grandfather     No Known Problems Maternal Aunt     No Known Problems Maternal Aunt     No Known Problems Paternal Aunt     Deep vein thrombosis Maternal Grandmother        Meds/Allergies       Current Outpatient Medications:     cholecalciferol (VITAMIN D3) 400 units tablet    citalopram (CeleXA) 10 mg tablet    traMADol (ULTRAM) 50 mg tablet    mupirocin (BACTROBAN) 2 % ointment    Current Facility-Administered Medications:     sodium chloride 0.9 % infusion, 75 mL/hr, Intravenous, Continuous    No Known Allergies    Objective     Ht 5' 4\" (1.626 m)   Wt 59 kg (130 lb)   BMI 22.31 kg/m²       PHYSICAL EXAM    Gen: NAD  Head: NCAT  CV: RRR  CHEST: Clear  ABD: soft, NT/ND  EXT: no edema      ASSESSMENT/PLAN:  This is a 43 y.o. year old female here for low ferritin level, and she is stable and optimized for her procedure.       "

## 2024-10-09 NOTE — ANESTHESIA PREPROCEDURE EVALUATION
Procedure:  COLONOSCOPY  EGD    Relevant Problems   HEMATOLOGY   (+) Iron deficiency anemia secondary to inadequate dietary iron intake      MUSCULOSKELETAL   (+) Chronic low back pain   (+) Lumbar spondylosis      NEURO/PSYCH   (+) Chronic low back pain   (+) Mild depression        Physical Exam    Airway    Mallampati score: III  TM Distance: >3 FB  Neck ROM: full     Dental   No notable dental hx     Cardiovascular  Cardiovascular exam normal    Pulmonary  Pulmonary exam normal     Other Findings  post-pubertal.      Anesthesia Plan  ASA Score- 2     Anesthesia Type- IV sedation with anesthesia with ASA Monitors.         Additional Monitors:     Airway Plan:            Plan Factors-Exercise tolerance (METS): >4 METS.    Chart reviewed.    Patient summary reviewed.    Patient is not a current smoker.              Induction- intravenous.    Postoperative Plan-         Informed Consent- Anesthetic plan and risks discussed with patient.

## 2024-10-09 NOTE — ANESTHESIA POSTPROCEDURE EVALUATION
Post-Op Assessment Note    CV Status:  Stable  Pain Score: 0    Pain management: adequate       Mental Status:  Alert and awake   Hydration Status:  Euvolemic   PONV Controlled:  Controlled   Airway Patency:  Patent     Post Op Vitals Reviewed: Yes      Staff: CRNA           Last Filed PACU Vitals:  Vitals Value Taken Time   Temp     Pulse 84 10/09/24 1042   BP 93/50 10/09/24 1042   Resp 22 10/09/24 1042   SpO2 99 % 10/09/24 1042       Modified Isaac:  Activity: 2 (10/9/2024  9:45 AM)  Respiration: 2 (10/9/2024  9:45 AM)  Circulation: 2 (10/9/2024  9:45 AM)  Consciousness: 2 (10/9/2024  9:45 AM)  Oxygen Saturation: 2 (10/9/2024  9:45 AM)  Modified Isaac Score: 10 (10/9/2024  9:45 AM)

## 2024-10-09 NOTE — ANESTHESIA POSTPROCEDURE EVALUATION
Post-Op Assessment Note            No anethesia notable event occurred.            Last Filed PACU Vitals:  Vitals Value Taken Time   Temp     Pulse 68 10/09/24 1057   /63 10/09/24 1057   Resp 16 10/09/24 1057   SpO2 99 % 10/09/24 1057       Modified Isaac:  Activity: 2 (10/9/2024 10:57 AM)  Respiration: 2 (10/9/2024 10:57 AM)  Circulation: 2 (10/9/2024 10:57 AM)  Consciousness: 2 (10/9/2024 10:57 AM)  Oxygen Saturation: 2 (10/9/2024 10:57 AM)  Modified Isaac Score: 10 (10/9/2024 10:57 AM)

## 2024-10-14 ENCOUNTER — PATIENT MESSAGE (OUTPATIENT)
Dept: FAMILY MEDICINE CLINIC | Facility: CLINIC | Age: 44
End: 2024-10-14

## 2024-10-15 DIAGNOSIS — G89.29 CHRONIC BACK PAIN, UNSPECIFIED BACK LOCATION, UNSPECIFIED BACK PAIN LATERALITY: ICD-10-CM

## 2024-10-15 DIAGNOSIS — M54.9 CHRONIC BACK PAIN, UNSPECIFIED BACK LOCATION, UNSPECIFIED BACK PAIN LATERALITY: ICD-10-CM

## 2024-10-16 ENCOUNTER — HOSPITAL ENCOUNTER (OUTPATIENT)
Dept: INFUSION CENTER | Facility: CLINIC | Age: 44
Discharge: HOME | End: 2024-10-16
Payer: COMMERCIAL

## 2024-10-16 VITALS
DIASTOLIC BLOOD PRESSURE: 70 MMHG | SYSTOLIC BLOOD PRESSURE: 105 MMHG | RESPIRATION RATE: 18 BRPM | TEMPERATURE: 96.8 F | HEART RATE: 84 BPM

## 2024-10-16 DIAGNOSIS — R79.0 LOW FERRITIN: ICD-10-CM

## 2024-10-16 DIAGNOSIS — D50.8 IRON DEFICIENCY ANEMIA SECONDARY TO INADEQUATE DIETARY IRON INTAKE: Primary | ICD-10-CM

## 2024-10-16 PROCEDURE — 88305 TISSUE EXAM BY PATHOLOGIST: CPT | Performed by: STUDENT IN AN ORGANIZED HEALTH CARE EDUCATION/TRAINING PROGRAM

## 2024-10-16 PROCEDURE — 96365 THER/PROPH/DIAG IV INF INIT: CPT

## 2024-10-16 PROCEDURE — 96366 THER/PROPH/DIAG IV INF ADDON: CPT

## 2024-10-16 RX ORDER — SODIUM CHLORIDE 9 MG/ML
20 INJECTION, SOLUTION INTRAVENOUS ONCE
Status: COMPLETED | OUTPATIENT
Start: 2024-10-16 | End: 2024-10-16

## 2024-10-16 RX ORDER — TRAMADOL HYDROCHLORIDE 50 MG/1
50 TABLET ORAL EVERY 6 HOURS PRN
Qty: 180 TABLET | Refills: 0 | Status: SHIPPED | OUTPATIENT
Start: 2024-10-16

## 2024-10-16 RX ORDER — SODIUM CHLORIDE 9 MG/ML
20 INJECTION, SOLUTION INTRAVENOUS ONCE
Status: CANCELLED | OUTPATIENT
Start: 2024-10-23

## 2024-10-16 RX ADMIN — SODIUM CHLORIDE 20 ML/HR: 0.9 INJECTION, SOLUTION INTRAVENOUS at 08:33

## 2024-10-16 RX ADMIN — IRON SUCROSE 200 MG: 20 INJECTION, SOLUTION INTRAVENOUS at 08:31

## 2024-10-16 NOTE — PROGRESS NOTES
Lucia Arita  tolerated Venofer well with no complications.      Lucia Arita is aware of future appt on Wednesday Oct 23, 2024 8:00 AM.     AVS declined by Lucia Arita.

## 2024-10-23 ENCOUNTER — HOSPITAL ENCOUNTER (OUTPATIENT)
Dept: INFUSION CENTER | Facility: CLINIC | Age: 44
Discharge: HOME | End: 2024-10-23
Payer: COMMERCIAL

## 2024-10-23 VITALS
TEMPERATURE: 97.2 F | HEART RATE: 70 BPM | DIASTOLIC BLOOD PRESSURE: 70 MMHG | SYSTOLIC BLOOD PRESSURE: 106 MMHG | RESPIRATION RATE: 16 BRPM

## 2024-10-23 DIAGNOSIS — R79.0 LOW FERRITIN: ICD-10-CM

## 2024-10-23 DIAGNOSIS — D50.8 IRON DEFICIENCY ANEMIA SECONDARY TO INADEQUATE DIETARY IRON INTAKE: Primary | ICD-10-CM

## 2024-10-23 RX ORDER — SODIUM CHLORIDE 9 MG/ML
20 INJECTION, SOLUTION INTRAVENOUS ONCE
OUTPATIENT
Start: 2024-10-30

## 2024-10-23 RX ORDER — SODIUM CHLORIDE 9 MG/ML
20 INJECTION, SOLUTION INTRAVENOUS ONCE
Status: COMPLETED | OUTPATIENT
Start: 2024-10-23 | End: 2024-10-23

## 2024-10-23 RX ADMIN — SODIUM CHLORIDE 20 ML/HR: 0.9 INJECTION, SOLUTION INTRAVENOUS at 09:46

## 2024-10-23 RX ADMIN — IRON SUCROSE 200 MG: 20 INJECTION, SOLUTION INTRAVENOUS at 09:47

## 2024-10-23 NOTE — PROGRESS NOTES
Pt tolerated venofer infusion without incident.  Pt declined AVS but is aware of her next appt on Oct 30 at 0900

## 2024-10-23 NOTE — PLAN OF CARE
Problem: Potential for Falls  Goal: Patient will remain free of falls  Description: INTERVENTIONS:  - Educate patient/family on patient safety including physical limitations  - Instruct patient to call for assistance with activity   - Consult OT/PT to assist with strengthening/mobility   - Keep Call bell within reach  - Keep bed low and locked with side rails adjusted as appropriate  - Keep care items and personal belongings within reach  - Initiate and maintain comfort rounds  - Make Fall Risk Sign visible to staff  - Offer Toileting every    Hours, in advance of need  - Initiate/Maintain alarm  - Obtain necessary fall risk management equipment:   - Apply yellow socks and bracelet for high fall risk patients  - Consider moving patient to room near nurses station  Outcome: Progressing

## 2024-10-30 ENCOUNTER — HOSPITAL ENCOUNTER (OUTPATIENT)
Dept: INFUSION CENTER | Facility: CLINIC | Age: 44
Discharge: HOME | End: 2024-10-30
Payer: COMMERCIAL

## 2024-10-30 VITALS
RESPIRATION RATE: 16 BRPM | HEART RATE: 72 BPM | TEMPERATURE: 97 F | SYSTOLIC BLOOD PRESSURE: 102 MMHG | DIASTOLIC BLOOD PRESSURE: 70 MMHG

## 2024-10-30 DIAGNOSIS — D50.8 IRON DEFICIENCY ANEMIA SECONDARY TO INADEQUATE DIETARY IRON INTAKE: Primary | ICD-10-CM

## 2024-10-30 DIAGNOSIS — R79.0 LOW FERRITIN: ICD-10-CM

## 2024-10-30 PROCEDURE — 96365 THER/PROPH/DIAG IV INF INIT: CPT

## 2024-10-30 RX ORDER — SODIUM CHLORIDE 9 MG/ML
20 INJECTION, SOLUTION INTRAVENOUS ONCE
Status: CANCELLED | OUTPATIENT
Start: 2024-11-06

## 2024-10-30 RX ORDER — SODIUM CHLORIDE 9 MG/ML
20 INJECTION, SOLUTION INTRAVENOUS ONCE
Status: COMPLETED | OUTPATIENT
Start: 2024-10-30 | End: 2024-10-30

## 2024-10-30 RX ADMIN — SODIUM CHLORIDE 20 ML/HR: 0.9 INJECTION, SOLUTION INTRAVENOUS at 09:18

## 2024-10-30 RX ADMIN — IRON SUCROSE 200 MG: 20 INJECTION, SOLUTION INTRAVENOUS at 09:18

## 2024-10-30 NOTE — PROGRESS NOTES
"Pt arrived for her Venofer infusion. Offers no new complaints/concerns but does state \"I feel tired for a few days after the infusion and then I start feeling good\".    Venofer fully infused via patent IV site with adverse reactions/infiltrates.    Pt tolerated same well.    Declined AVS and is aware to return on 11/06/24 at 9AM to continue treatment.    Was discharged in stable condition.  "

## 2024-10-30 NOTE — PLAN OF CARE
Problem: Potential for Falls  Goal: Patient will remain free of falls  Description: INTERVENTIONS:  - Educate patient/family on patient safety including physical limitations  - Instruct patient to call for assistance with activity   - Consult OT/PT to assist with strengthening/mobility   - Keep Call bell within reach  - Keep bed low and locked with side rails adjusted as appropriate  - Keep care items and personal belongings within reach  - Initiate and maintain comfort rounds  - Make Fall Risk Sign visible to staff  - Offer Toileting every    Hours, in advance of need  - Initiate/Maintain   alarm  - Obtain necessary fall risk management equipment:     - Apply yellow socks and bracelet for high fall risk patients  - Consider moving patient to room near nurses station  Outcome: Completed

## 2024-11-04 ENCOUNTER — TELEPHONE (OUTPATIENT)
Dept: HEMATOLOGY ONCOLOGY | Facility: CLINIC | Age: 44
End: 2024-11-04

## 2024-11-04 ENCOUNTER — TELEPHONE (OUTPATIENT)
Age: 44
End: 2024-11-04

## 2024-11-04 DIAGNOSIS — R11.0 NAUSEA: Primary | ICD-10-CM

## 2024-11-04 RX ORDER — DIPHENHYDRAMINE HCL 25 MG
12.5 TABLET ORAL ONCE
Status: CANCELLED
Start: 2024-11-13 | End: 2024-11-04

## 2024-11-04 RX ORDER — ONDANSETRON 4 MG/1
4 TABLET, FILM COATED ORAL EVERY 8 HOURS PRN
Qty: 20 TABLET | Refills: 0 | Status: SHIPPED | OUTPATIENT
Start: 2024-11-04

## 2024-11-04 RX ORDER — ACETAMINOPHEN 325 MG/1
650 TABLET ORAL ONCE
Status: CANCELLED
Start: 2024-11-13 | End: 2024-11-04

## 2024-11-04 NOTE — TELEPHONE ENCOUNTER
Pt stated Oncology Provider: Claudia Barkley    Actionable item:  Call patient with further recommendations.    What is the reason for the call/chief complaint?  Received the following message from patient...    Good morning,      I’m going to have to call to reschedule my iron infusion scheduled for this week. I cannot leave work. I just wanted to let you know I’ll have to call them to schedule for next week. Leaving for voting Tuesday and I have an enhanced mammogram (because of concerns with my first one) Friday day I have to leave for as well this week. So I have to push the infusion out.      I also wanted to let you know after each infusion I have felt ministerio awful for about 4/5 days. I did look it up and saw it is possible to feel that way after an infusion… I just wanted to let you know after the 4th one I am still feeling that way. I also might need to go to urgent care tomorrow because I think I have a uti. I don’t know if the at could be a side effect of the infusions.            Thank you,  Lucia     Patient called back regarding symptoms. Patient stated that she is experiencing nausea and is inquiring if she can be prescribed an anti-emetic.  Patient stated that the UTI symptoms have improved, but did instruct her to f/u with PCP or urgent care for this.  Patient also stated that her period is a week late and inquiring if this could be d/t iron infusions, as she reported that there is no chance of her being pregnant.  Patient stated that she can receive messages via [employer] with further recommendations.         Priority: Routine

## 2024-11-04 NOTE — TELEPHONE ENCOUNTER
I will send a prescription for zofran for the nausea. You may take 4 mg once every 6 hours as needed for nausea. I can add premeds including tylenol, benadryl, and nausea medicine with zofran and dexamethasone to the infusion plan moving forward. You may schedule it based on your availability. Please contact the infusion center.

## 2024-11-04 NOTE — TELEPHONE ENCOUNTER
Patient cancelled next infusion but will reschedule. Please note that premeds were added to the plan.   Thanks.

## 2024-11-06 ENCOUNTER — HOSPITAL ENCOUNTER (OUTPATIENT)
Dept: INFUSION CENTER | Facility: CLINIC | Age: 44
Discharge: HOME | End: 2024-11-06

## 2024-11-07 ENCOUNTER — APPOINTMENT (OUTPATIENT)
Dept: LAB | Facility: CLINIC | Age: 44
End: 2024-11-07

## 2024-11-07 DIAGNOSIS — Z00.6 ENCOUNTER FOR EXAMINATION FOR NORMAL COMPARISON OR CONTROL IN CLINICAL RESEARCH PROGRAM: ICD-10-CM

## 2024-11-07 PROCEDURE — 36415 COLL VENOUS BLD VENIPUNCTURE: CPT

## 2024-11-08 ENCOUNTER — HOSPITAL ENCOUNTER (OUTPATIENT)
Dept: ULTRASOUND IMAGING | Facility: CLINIC | Age: 44
Discharge: HOME | End: 2024-11-08
Payer: COMMERCIAL

## 2024-11-08 ENCOUNTER — HOSPITAL ENCOUNTER (OUTPATIENT)
Dept: MAMMOGRAPHY | Facility: CLINIC | Age: 44
Discharge: HOME | End: 2024-11-08
Payer: COMMERCIAL

## 2024-11-08 DIAGNOSIS — R92.8 ABNORMAL MAMMOGRAM: ICD-10-CM

## 2024-11-08 PROCEDURE — 76642 ULTRASOUND BREAST LIMITED: CPT

## 2024-11-08 PROCEDURE — 77066 DX MAMMO INCL CAD BI: CPT

## 2024-11-08 RX ADMIN — IOHEXOL 89 ML: 350 INJECTION, SOLUTION INTRAVENOUS at 10:04

## 2024-11-13 ENCOUNTER — HOSPITAL ENCOUNTER (OUTPATIENT)
Dept: INFUSION CENTER | Facility: CLINIC | Age: 44
Discharge: HOME | End: 2024-11-13
Payer: COMMERCIAL

## 2024-11-13 VITALS
SYSTOLIC BLOOD PRESSURE: 94 MMHG | DIASTOLIC BLOOD PRESSURE: 65 MMHG | HEART RATE: 66 BPM | RESPIRATION RATE: 18 BRPM | TEMPERATURE: 98.5 F

## 2024-11-13 DIAGNOSIS — G89.29 CHRONIC BACK PAIN, UNSPECIFIED BACK LOCATION, UNSPECIFIED BACK PAIN LATERALITY: ICD-10-CM

## 2024-11-13 DIAGNOSIS — R79.0 LOW FERRITIN: ICD-10-CM

## 2024-11-13 DIAGNOSIS — D50.8 IRON DEFICIENCY ANEMIA SECONDARY TO INADEQUATE DIETARY IRON INTAKE: ICD-10-CM

## 2024-11-13 DIAGNOSIS — M54.9 CHRONIC BACK PAIN, UNSPECIFIED BACK LOCATION, UNSPECIFIED BACK PAIN LATERALITY: ICD-10-CM

## 2024-11-13 DIAGNOSIS — R11.0 NAUSEA: Primary | ICD-10-CM

## 2024-11-13 PROCEDURE — 96365 THER/PROPH/DIAG IV INF INIT: CPT

## 2024-11-13 RX ORDER — DIPHENHYDRAMINE HCL 25 MG
12.5 TABLET ORAL ONCE
Status: DISCONTINUED | OUTPATIENT
Start: 2024-11-13 | End: 2024-11-16 | Stop reason: HOSPADM

## 2024-11-13 RX ORDER — ACETAMINOPHEN 325 MG/1
650 TABLET ORAL ONCE
Status: CANCELLED
Start: 2024-11-18 | End: 2024-11-18

## 2024-11-13 RX ORDER — SODIUM CHLORIDE 9 MG/ML
20 INJECTION, SOLUTION INTRAVENOUS ONCE
Status: COMPLETED | OUTPATIENT
Start: 2024-11-13 | End: 2024-11-13

## 2024-11-13 RX ORDER — SODIUM CHLORIDE 9 MG/ML
20 INJECTION, SOLUTION INTRAVENOUS ONCE
Status: CANCELLED | OUTPATIENT
Start: 2024-11-18

## 2024-11-13 RX ORDER — ACETAMINOPHEN 325 MG/1
650 TABLET ORAL ONCE
Status: DISCONTINUED | OUTPATIENT
Start: 2024-11-13 | End: 2024-11-16 | Stop reason: HOSPADM

## 2024-11-13 RX ORDER — DIPHENHYDRAMINE HCL 25 MG
12.5 TABLET ORAL ONCE
Status: CANCELLED
Start: 2024-11-18 | End: 2024-11-18

## 2024-11-13 RX ADMIN — SODIUM CHLORIDE 20 ML/HR: 9 INJECTION, SOLUTION INTRAVENOUS at 10:50

## 2024-11-13 RX ADMIN — IRON SUCROSE 200 MG: 20 INJECTION, SOLUTION INTRAVENOUS at 10:55

## 2024-11-13 NOTE — PROGRESS NOTES
Patient arrived to unit stating that she had several days of nausea post her previous Venofer infusions. Patient took PO Zofran prior to coming in for the infusion and refused the ordered pre medications. Patient tolerated Venofer infusion without incident. AVS declined and patient does not have a future infusion appointment at this time. Patient left in stable condition.

## 2024-11-13 NOTE — PLAN OF CARE
Problem: Potential for Falls  Goal: Patient will remain free of falls  Description: INTERVENTIONS:  - Educate patient/family on patient safety including physical limitations  - Instruct patient to call for assistance with activity   - Consult OT/PT to assist with strengthening/mobility   - Keep Call bell within reach  - Keep bed low and locked with side rails adjusted as appropriate  - Keep care items and personal belongings within reach  - Initiate and maintain comfort rounds  - Make Fall Risk Sign visible to staff  - Apply yellow socks and bracelet for high fall risk patients  - Consider moving patient to room near nurses station  Outcome: Progressing      Nilton BERGMAN Stephanie, scribed for Carlos Mratines MD on 02/17/18 at 2209 .





HPI Febrile Illness





- HPI Summary


HPI Summary: 


The pt is a 3 y 9m old M presenting to the ED with c/o fever that began at 21:

06 today. Symptoms include cough, rhinorrhea, decreased oral intake, HA and abd 

pain that began at 18:30 tonight. Per mother, the pt denies N/V/D.  Per mother, 

the fever has been intermittent throughout the day. The pts mother has been 

giving him Motrin throughout the day. His last dose of Motrin was at 20:15.








- History of Current Complaint


Chief Complaint: EDFever


Time Seen by Provider: 02/17/18 21:16


Hx Obtained From: Patient, Family/Caretaker - mother


Onset/Duration: Started Hours Ago - 12, Still Present


Timing: Constant


Current Severity: Moderate


Pain Intensity: 4


Pain Scale Used: 0-10 Numeric


Aggravating Factors: Nothing


Alleviating Factors: Nothing


Associated Signs and Symptoms: Cough, Other: - rhinorrhea, decreased oral intake

, HA and abd pain





- Allergy/Home Medications


Allergies/Adverse Reactions: 


 Allergies











Allergy/AdvReac Type Severity Reaction Status Date / Time


 


MS Amoxicillin [Amoxicillin] Allergy  Rash Verified 07/07/17 01:22














PMH/Surg Hx/FS Hx/Imm Hx


Endocrine/Hematology History: 


   Denies: Hx Diabetes


Respiratory History: 


   Denies: Hx Asthma


EENT History: 


   Denies: Hx Deafness





- Surgical History


Surgery Procedure, Year, and Place: NONE





- Immunization History


Date of Influenza Vaccine: utd


Infectious Disease History: No


Infectious Disease History: 


   Denies: Traveled Outside the [place] in Last 30 Days





- Family History


Known Family History: 


   Negative: Hypertension, Diabetes





- Social History


Occupation: Student


Lives: With Family


Alcohol Use: None


Hx Substance Use: No


Hx Tobacco Use: No


Smoking Status (MU): Never Smoked Tobacco





Review of Systems


Positive: Fever, Other - decreased oral intake.  Negative: Chills


Positive: Other - rhinorrhea.  Negative: Erythema


Negative: Sore Throat


Negative: Chest Pain


Positive: Cough.  Negative: Shortness Of Breath


Positive: Abdominal Pain.  Negative: Vomiting, Nausea


Negative: dysuria, hematuria


Negative: Myalgia, Edema


Skin: Other - Negative: dizziness


Negative: Rash


All Other Systems Reviewed And Are Negative: Yes





Physical Exam





- Summary


Physical Exam Summary: 


Constitutional: Well-developed, Well-nourished, Alert, Active, Social smile 

present. (-) Distressed


HENT: Right TM normal and Left TM normal, Normal nose, Mucous membranes moist


Eyes: Conjunctiva normal, EOM intact, PERRL. (-) Left and right eye discharge


Neck: Neck supple


Cardio: Rhythm regular, rate normal, Heart sounds normal, S1 normal, S2 normal, 

Intact distal pulses, Pulses strong. (-) Murmur


Pulmonary/Chest wall: Effort normal, Breath sounds normal. (-) Retraction, (-) 

Respiratory distress, (-) Wheezes, (-) Rales, (-) Rhonchi, (-) Stridor, (-) 

Nasal flaring


Abd: Soft. (-) Distension, (-) Tenderness, (-) Guarding, (-) Rebound, (-) 

Hepatosplenomegaly, (-) Mass


Musculoskeletal: Normal ROM. (-) Edema


Lymph: (-) Cervical adenopathy


Neuro: Alert


Skin: Warm, Dry. (-) Rash, (-) Purpura, (-) Diaphoresis, (-) Petechiae, (-) 

Cyanosis


Genital Exam: normal





Triage Information Reviewed: Yes


Vital Signs On Initial Exam: 


 Initial Vitals











Temp Pulse Resp BP Pulse Ox


 


 99.4 F   134   20   111/67   96 


 


 02/17/18 21:03  02/17/18 21:03  02/17/18 21:03  02/17/18 21:03  02/17/18 21:03











Vital Signs Reviewed: Yes





Diagnostics





- Vital Signs


 Vital Signs











  Temp Pulse Resp BP Pulse Ox


 


 02/17/18 21:03  99.4 F  134  20  111/67  96














- Laboratory


Lab Results: 


 Lab Results











  02/17/18 Range/Units





  21:20 


 


Urine Color  Yellow  


 


Urine Appearance  Cloudy  


 


Urine pH  7.0  (5-9)  


 


Ur Specific Gravity  1.026  (1.010-1.030)  


 


Urine Protein  1+(30 mg/dl) H  (Negative)  


 


Urine Ketones  Trace H  (Negative)  


 


Urine Blood  Negative  (Negative)  


 


Urine Nitrate  Negative  (Negative)  


 


Urine Bilirubin  Negative  (Negative)  


 


Urine Urobilinogen  Negative  (Negative)  


 


Ur Leukocyte Esterase  Negative  (Negative)  


 


Urine WBC (Auto)  Absent  (Absent)  


 


Urine RBC (Auto)  Absent  (Absent)  


 


Urine Bacteria  Absent  (Absent)  


 


Urine Glucose  Negative  (Negative)  


 


Urine Ascorbic Acid  * H  (Negative)  











Lab Statement: Any lab studies that have been ordered have been reviewed, and 

results considered in the medical decision making process.





Re-Evaluation





- Re-Evaluation


  ** First Eval


Re-Evaluation Time: 22:52


Change: Improved - Pt is tolerating PO well.





Course/Dx





- Course


Course Of Treatment: The pt is positive for influenza A. ED physician 

instructed mother that younger brother will need Tamiflu. Mother should seek 

Tamiflu through his pediatrician. The pt has good hydration status and is 

nontoxic appearing.





- Diagnoses


Provider Diagnoses: 


 Influenza








Discharge





- Discharge Plan


Condition: Stable


Disposition: HOME


Prescriptions: 


Oseltamivir SUSP 45 MG dose* [Tamiflu SUSP 45 MG dose*] 45 mg PO BID #14 

oral.syrin


Patient Education Materials:  Influenza in Children (ED)


Referrals: 


Sultana Welsh MD [Primary Care Provider] - 3 Days


Additional Instructions: 


RETURN TO THE EMERGENCY DEPARTMENT FOR CHANGING OR WORSENING SYMPTOMS





ED physician instructed mother that younger brother will need Tamiflu. Mother 

should seek Tamiflu through his pediatrician. 














The documentation as recorded by the Nilton mathews Stephanie accurately reflects 

the service I personally performed and the decisions made by me, Carlos Martines MD.

## 2024-11-14 RX ORDER — TRAMADOL HYDROCHLORIDE 50 MG/1
50 TABLET ORAL EVERY 6 HOURS PRN
Qty: 180 TABLET | Refills: 0 | Status: SHIPPED | OUTPATIENT
Start: 2024-11-14

## 2024-11-19 LAB
APOB+LDLR+PCSK9 GENE MUT ANL BLD/T: NOT DETECTED
BRCA1+BRCA2 DEL+DUP + FULL MUT ANL BLD/T: NOT DETECTED
MLH1+MSH2+MSH6+PMS2 GN DEL+DUP+FUL M: NOT DETECTED

## 2024-11-21 ENCOUNTER — OFFICE VISIT (OUTPATIENT)
Dept: FAMILY MEDICINE CLINIC | Facility: CLINIC | Age: 44
End: 2024-11-21
Payer: COMMERCIAL

## 2024-11-21 VITALS
SYSTOLIC BLOOD PRESSURE: 99 MMHG | DIASTOLIC BLOOD PRESSURE: 65 MMHG | BODY MASS INDEX: 23.57 KG/M2 | TEMPERATURE: 98.9 F | HEART RATE: 70 BPM | WEIGHT: 133 LBS | OXYGEN SATURATION: 99 % | HEIGHT: 63 IN

## 2024-11-21 DIAGNOSIS — C44.519 BASAL CELL CARCINOMA OF BACK: ICD-10-CM

## 2024-11-21 DIAGNOSIS — F32.A MILD DEPRESSION: ICD-10-CM

## 2024-11-21 DIAGNOSIS — Z13.220 SCREENING CHOLESTEROL LEVEL: ICD-10-CM

## 2024-11-21 DIAGNOSIS — M54.50 CHRONIC LOW BACK PAIN, UNSPECIFIED BACK PAIN LATERALITY, UNSPECIFIED WHETHER SCIATICA PRESENT: Primary | ICD-10-CM

## 2024-11-21 DIAGNOSIS — Z13.1 SCREENING FOR DIABETES MELLITUS: ICD-10-CM

## 2024-11-21 DIAGNOSIS — K59.09 CHRONIC CONSTIPATION: ICD-10-CM

## 2024-11-21 DIAGNOSIS — Z13.29 SCREENING FOR THYROID DISORDER: ICD-10-CM

## 2024-11-21 DIAGNOSIS — G89.29 CHRONIC LOW BACK PAIN, UNSPECIFIED BACK PAIN LATERALITY, UNSPECIFIED WHETHER SCIATICA PRESENT: Primary | ICD-10-CM

## 2024-11-21 DIAGNOSIS — E61.1 IRON DEFICIENCY: ICD-10-CM

## 2024-11-21 DIAGNOSIS — R92.8 ABNORMAL MAMMOGRAM: ICD-10-CM

## 2024-11-21 PROCEDURE — 99214 OFFICE O/P EST MOD 30 MIN: CPT | Performed by: FAMILY MEDICINE

## 2024-11-21 NOTE — ASSESSMENT & PLAN NOTE
Longstanding history of chronic back pain.  Patient evaluated by neurosurgery and pain management in the past.  Status post rhizotomy.  Patient has tried multiple medications in the past including NSAIDs, muscle relaxers, and pregabalin without benefit.  She has been stable on tramadol 50 mg, 6 tablets daily for many years.  Denies any side effects.    Most recent UDS showed no inconsistencies  PDMP checked.  No red flags  Patient is updated controlled substance agreement

## 2024-11-21 NOTE — ASSESSMENT & PLAN NOTE
History of iron deficiency with intolerance to oral iron.  Being followed by hematology.  Status post 5 iron infusions.  Due for repeat labs in January.  Continue follow-up with specialist as directed

## 2024-11-21 NOTE — PROGRESS NOTES
Name: Lucia Arita      : 1980      MRN: [id number]  Encounter Provider: Luis Miguel Arndt DO  Encounter Date: 2024   Encounter department: [facility]    Assessment & Plan  Chronic low back pain, unspecified back pain laterality, unspecified whether sciatica present  Longstanding history of chronic back pain.  Patient evaluated by neurosurgery and pain management in the past.  Status post rhizotomy.  Patient has tried multiple medications in the past including NSAIDs, muscle relaxers, and pregabalin without benefit.  She has been stable on tramadol 50 mg, 6 tablets daily for many years.  Denies any side effects.    Most recent UDS showed no inconsistencies  PDMP checked.  No red flags  Patient is updated controlled substance agreement       Iron deficiency  History of iron deficiency with intolerance to oral iron.  Being followed by hematology.  Status post 5 iron infusions.  Due for repeat labs in January.  Continue follow-up with specialist as directed       Mild depression  Stable on citalopram 10         Basal cell carcinoma of back  Continue follow-up with dermatologist as directed       Chronic constipation  Secondary to chronic tramadol use       Screening for diabetes mellitus    Orders:    Comprehensive metabolic panel; Future    Screening cholesterol level    Orders:    Lipid Panel with Direct LDL reflex; Future    Screening for thyroid disorder    Orders:    TSH, 3rd generation with Free T4 reflex; Future    Abnormal mammogram    Orders:    Mammo diagnostic right w cad; Future    US breast right limited (diagnostic); Future       Current with breast cancer screening  Colonoscopy 2024 (next due )    Due for labs in January (orders placed in addition to orders from the hematologist).    3 months        History of Present Illness     Patient presents for recheck of chronic medical problems today.  Overall no changes in health.  Patient was recently laid off  from her job      Review of Systems   Respiratory: Negative.     Cardiovascular: Negative.    Gastrointestinal: Negative.    Genitourinary: Negative.    Musculoskeletal:  Positive for back pain.     Past Medical History:   Diagnosis Date    Anxiety     Last assessed: 10/11/13    Basal cell carcinoma     on going    Cancer (HCC)     Skin -Basal Cell    Constipation     Insomnia     Last assessed: 13    Sacroiliitis (HCC)     Last assessed: 10/11/13    Urinary tract infection 2020    Vitamin D deficiency     Last assessed: 10/11/13     Past Surgical History:   Procedure Laterality Date     SECTION      x2 Last assessed: 14    COLONOSCOPY      HERNIA REPAIR  07/21/15    Dr repaired during csection    SKIN LESION EXCISION      Basal cell skin cancer excision at age 14    SPINE SURGERY      Radiofrequency ablation     Family History   Problem Relation Age of Onset    Diabetes Family         Mellitus    Hypertension Family     Hypertension Mother     Osteoporosis Mother     Diabetes Father     ALS Father     Alcohol abuse Father     Lung cancer Maternal Grandfather 70    Alcohol abuse Maternal Grandfather     Ovarian cancer Paternal Grandmother 63    Cancer Paternal Grandmother         Ovarian    Melanoma Maternal Uncle         age unknown    No Known Problems Sister     No Known Problems Paternal Grandfather     No Known Problems Maternal Aunt     No Known Problems Maternal Aunt     No Known Problems Paternal Aunt     Deep vein thrombosis Maternal Grandmother      Social History     Tobacco Use    Smoking status: Former     Current packs/day: 0.00     Average packs/day: 0.3 packs/day for 10.0 years (2.5 ttl pk-yrs)     Types: Cigarettes     Start date: 2004     Quit date: 2014     Years since quitting: 10.0     Passive exposure: Never    Smokeless tobacco: Former     Quit date: 2014    Tobacco comments:     QUIT 4 YEARS AGO   Vaping Use    Vaping status: Never Used   Substance and  "Sexual Activity    Alcohol use: Not Currently     Alcohol/week: 2.0 standard drinks of alcohol     Types: 2 Glasses of wine per week    Drug use: No    Sexual activity: Yes     Partners: Male     Birth control/protection: Male Sterilization     Current Outpatient Medications on File Prior to Visit   Medication Sig    cholecalciferol (VITAMIN D3) 400 units tablet Take 400 Units by mouth daily    citalopram (CeleXA) 10 mg tablet Take 1 tablet (10 mg total) by mouth daily    mupirocin (BACTROBAN) 2 % ointment APPLY TO AFFECTED AREA 3 TIMES A DAY    ondansetron (ZOFRAN) 4 mg tablet Take 1 tablet (4 mg total) by mouth every 8 (eight) hours as needed for nausea or vomiting    traMADol (ULTRAM) 50 mg tablet Take 1 tablet (50 mg total) by mouth every 6 (six) hours as needed for moderate pain 1-2 TABS BY MOUTH EVERY 6 HOURS AS NEEDED (MAX 6 TABS DAILY)     No Known Allergies  Immunization History   Administered Date(s) Administered    Hep B, adult 12/17/1998, 05/27/1999    MMR 12/17/1998    Tdap 07/01/2015     Objective   BP 99/65 (BP Location: Left arm, Patient Position: Sitting, Cuff Size: Adult)   Pulse 70   Temp 98.9 °F (37.2 °C)   Ht 5' 3\" (1.6 m)   Wt 60.3 kg (133 lb)   SpO2 99%   BMI 23.56 kg/m²     Physical Exam  Cardiovascular:      Rate and Rhythm: Normal rate and regular rhythm.      Heart sounds: Normal heart sounds.      Comments: Carotids: no bruits  Ext: no edema  Pulmonary:      Effort: Pulmonary effort is normal. No respiratory distress.      Breath sounds: No wheezing or rales.   Psychiatric:         Behavior: Behavior normal.         Thought Content: Thought content normal.         "

## 2024-12-09 DIAGNOSIS — M54.9 CHRONIC BACK PAIN, UNSPECIFIED BACK LOCATION, UNSPECIFIED BACK PAIN LATERALITY: ICD-10-CM

## 2024-12-09 DIAGNOSIS — G89.29 CHRONIC BACK PAIN, UNSPECIFIED BACK LOCATION, UNSPECIFIED BACK PAIN LATERALITY: ICD-10-CM

## 2024-12-10 RX ORDER — TRAMADOL HYDROCHLORIDE 50 MG/1
50 TABLET ORAL EVERY 6 HOURS PRN
Qty: 180 TABLET | Refills: 0 | Status: SHIPPED | OUTPATIENT
Start: 2024-12-10

## 2024-12-16 DIAGNOSIS — F32.A MILD DEPRESSION: ICD-10-CM

## 2024-12-17 RX ORDER — CITALOPRAM HYDROBROMIDE 10 MG/1
10 TABLET ORAL DAILY
Qty: 90 TABLET | Refills: 1 | Status: SHIPPED | OUTPATIENT
Start: 2024-12-17

## 2025-01-07 DIAGNOSIS — G89.29 CHRONIC BACK PAIN, UNSPECIFIED BACK LOCATION, UNSPECIFIED BACK PAIN LATERALITY: ICD-10-CM

## 2025-01-07 DIAGNOSIS — M54.9 CHRONIC BACK PAIN, UNSPECIFIED BACK LOCATION, UNSPECIFIED BACK PAIN LATERALITY: ICD-10-CM

## 2025-01-08 RX ORDER — TRAMADOL HYDROCHLORIDE 50 MG/1
50 TABLET ORAL EVERY 6 HOURS PRN
Qty: 180 TABLET | Refills: 0 | Status: SHIPPED | OUTPATIENT
Start: 2025-01-08

## 2025-02-04 DIAGNOSIS — M54.9 CHRONIC BACK PAIN, UNSPECIFIED BACK LOCATION, UNSPECIFIED BACK PAIN LATERALITY: ICD-10-CM

## 2025-02-04 DIAGNOSIS — G89.29 CHRONIC BACK PAIN, UNSPECIFIED BACK LOCATION, UNSPECIFIED BACK PAIN LATERALITY: ICD-10-CM

## 2025-02-04 RX ORDER — TRAMADOL HYDROCHLORIDE 50 MG/1
50 TABLET ORAL EVERY 6 HOURS PRN
Qty: 180 TABLET | Refills: 0 | Status: SHIPPED | OUTPATIENT
Start: 2025-02-04

## 2025-02-28 ENCOUNTER — TELEPHONE (OUTPATIENT)
Dept: FAMILY MEDICINE CLINIC | Facility: CLINIC | Age: 45
End: 2025-02-28

## 2025-03-02 ENCOUNTER — PATIENT MESSAGE (OUTPATIENT)
Dept: FAMILY MEDICINE CLINIC | Facility: CLINIC | Age: 45
End: 2025-03-02

## 2025-03-02 DIAGNOSIS — M54.9 CHRONIC BACK PAIN, UNSPECIFIED BACK LOCATION, UNSPECIFIED BACK PAIN LATERALITY: ICD-10-CM

## 2025-03-02 DIAGNOSIS — G89.29 CHRONIC BACK PAIN, UNSPECIFIED BACK LOCATION, UNSPECIFIED BACK PAIN LATERALITY: ICD-10-CM

## 2025-03-03 RX ORDER — TRAMADOL HYDROCHLORIDE 50 MG/1
50 TABLET ORAL EVERY 6 HOURS PRN
Qty: 180 TABLET | Refills: 0 | Status: SHIPPED | OUTPATIENT
Start: 2025-03-03

## 2025-03-31 DIAGNOSIS — G89.29 CHRONIC BACK PAIN, UNSPECIFIED BACK LOCATION, UNSPECIFIED BACK PAIN LATERALITY: ICD-10-CM

## 2025-03-31 DIAGNOSIS — M54.9 CHRONIC BACK PAIN, UNSPECIFIED BACK LOCATION, UNSPECIFIED BACK PAIN LATERALITY: ICD-10-CM

## 2025-03-31 RX ORDER — TRAMADOL HYDROCHLORIDE 50 MG/1
50 TABLET ORAL EVERY 6 HOURS PRN
Qty: 180 TABLET | Refills: 0 | Status: SHIPPED | OUTPATIENT
Start: 2025-03-31 | End: 2025-04-06 | Stop reason: SDUPTHER

## 2025-04-01 ENCOUNTER — APPOINTMENT (OUTPATIENT)
Dept: LAB | Facility: CLINIC | Age: 45
End: 2025-04-01
Payer: COMMERCIAL

## 2025-04-01 DIAGNOSIS — R79.0 LOW FERRITIN: ICD-10-CM

## 2025-04-01 DIAGNOSIS — Z13.220 SCREENING CHOLESTEROL LEVEL: ICD-10-CM

## 2025-04-01 DIAGNOSIS — Z13.1 SCREENING FOR DIABETES MELLITUS: ICD-10-CM

## 2025-04-01 DIAGNOSIS — Z13.29 SCREENING FOR THYROID DISORDER: ICD-10-CM

## 2025-04-01 DIAGNOSIS — E61.1 IRON DEFICIENCY: ICD-10-CM

## 2025-04-01 DIAGNOSIS — D50.8 IRON DEFICIENCY ANEMIA SECONDARY TO INADEQUATE DIETARY IRON INTAKE: ICD-10-CM

## 2025-04-01 LAB
ALBUMIN SERPL BCG-MCNC: 4.3 G/DL (ref 3.5–5)
ALP SERPL-CCNC: 63 U/L (ref 34–104)
ALT SERPL W P-5'-P-CCNC: 13 U/L (ref 7–52)
ANION GAP SERPL CALCULATED.3IONS-SCNC: 10 MMOL/L (ref 4–13)
AST SERPL W P-5'-P-CCNC: 14 U/L (ref 13–39)
BASOPHILS # BLD AUTO: 0.03 THOUSANDS/ÂΜL (ref 0–0.1)
BASOPHILS NFR BLD AUTO: 1 % (ref 0–1)
BILIRUB SERPL-MCNC: 0.5 MG/DL (ref 0.2–1)
BUN SERPL-MCNC: 11 MG/DL (ref 5–25)
CALCIUM SERPL-MCNC: 8.8 MG/DL (ref 8.4–10.2)
CHLORIDE SERPL-SCNC: 102 MMOL/L (ref 96–108)
CHOLEST SERPL-MCNC: 211 MG/DL (ref ?–200)
CO2 SERPL-SCNC: 26 MMOL/L (ref 21–32)
CREAT SERPL-MCNC: 0.83 MG/DL (ref 0.6–1.3)
EOSINOPHIL # BLD AUTO: 0.06 THOUSAND/ÂΜL (ref 0–0.61)
EOSINOPHIL NFR BLD AUTO: 1 % (ref 0–6)
ERYTHROCYTE [DISTWIDTH] IN BLOOD BY AUTOMATED COUNT: 12.5 % (ref 11.6–15.1)
FERRITIN SERPL-MCNC: 77 NG/ML (ref 11–307)
GFR SERPL CREATININE-BSD FRML MDRD: 85 ML/MIN/1.73SQ M
GLUCOSE P FAST SERPL-MCNC: 83 MG/DL (ref 65–99)
HCT VFR BLD AUTO: 41.9 % (ref 34.8–46.1)
HDLC SERPL-MCNC: 81 MG/DL
HGB BLD-MCNC: 13.8 G/DL (ref 11.5–15.4)
IMM GRANULOCYTES # BLD AUTO: 0.01 THOUSAND/UL (ref 0–0.2)
IMM GRANULOCYTES NFR BLD AUTO: 0 % (ref 0–2)
IRON SATN MFR SERPL: 41 % (ref 15–50)
IRON SERPL-MCNC: 138 UG/DL (ref 50–212)
LDLC SERPL CALC-MCNC: 115 MG/DL (ref 0–100)
LYMPHOCYTES # BLD AUTO: 1.49 THOUSANDS/ÂΜL (ref 0.6–4.47)
LYMPHOCYTES NFR BLD AUTO: 26 % (ref 14–44)
MCH RBC QN AUTO: 30.7 PG (ref 26.8–34.3)
MCHC RBC AUTO-ENTMCNC: 32.9 G/DL (ref 31.4–37.4)
MCV RBC AUTO: 93 FL (ref 82–98)
MONOCYTES # BLD AUTO: 0.47 THOUSAND/ÂΜL (ref 0.17–1.22)
MONOCYTES NFR BLD AUTO: 8 % (ref 4–12)
NEUTROPHILS # BLD AUTO: 3.71 THOUSANDS/ÂΜL (ref 1.85–7.62)
NEUTS SEG NFR BLD AUTO: 64 % (ref 43–75)
NRBC BLD AUTO-RTO: 0 /100 WBCS
PLATELET # BLD AUTO: 308 THOUSANDS/UL (ref 149–390)
PMV BLD AUTO: 10 FL (ref 8.9–12.7)
POTASSIUM SERPL-SCNC: 3.7 MMOL/L (ref 3.5–5.3)
PROT SERPL-MCNC: 6.4 G/DL (ref 6.4–8.4)
RBC # BLD AUTO: 4.5 MILLION/UL (ref 3.81–5.12)
SODIUM SERPL-SCNC: 138 MMOL/L (ref 135–147)
TIBC SERPL-MCNC: 337.4 UG/DL (ref 250–450)
TRANSFERRIN SERPL-MCNC: 241 MG/DL (ref 203–362)
TRIGL SERPL-MCNC: 73 MG/DL (ref ?–150)
TSH SERPL DL<=0.05 MIU/L-ACNC: 3.46 UIU/ML (ref 0.45–4.5)
UIBC SERPL-MCNC: 199 UG/DL (ref 155–355)
WBC # BLD AUTO: 5.77 THOUSAND/UL (ref 4.31–10.16)

## 2025-04-01 PROCEDURE — 36415 COLL VENOUS BLD VENIPUNCTURE: CPT

## 2025-04-01 PROCEDURE — 82728 ASSAY OF FERRITIN: CPT

## 2025-04-01 PROCEDURE — 80061 LIPID PANEL: CPT

## 2025-04-01 PROCEDURE — 84443 ASSAY THYROID STIM HORMONE: CPT

## 2025-04-01 PROCEDURE — 83550 IRON BINDING TEST: CPT

## 2025-04-01 PROCEDURE — 85025 COMPLETE CBC W/AUTO DIFF WBC: CPT

## 2025-04-01 PROCEDURE — 80053 COMPREHEN METABOLIC PANEL: CPT

## 2025-04-01 PROCEDURE — 83540 ASSAY OF IRON: CPT

## 2025-04-02 ENCOUNTER — RESULTS FOLLOW-UP (OUTPATIENT)
Dept: FAMILY MEDICINE CLINIC | Facility: CLINIC | Age: 45
End: 2025-04-02

## 2025-04-06 DIAGNOSIS — G89.29 CHRONIC BACK PAIN, UNSPECIFIED BACK LOCATION, UNSPECIFIED BACK PAIN LATERALITY: ICD-10-CM

## 2025-04-06 DIAGNOSIS — M54.9 CHRONIC BACK PAIN, UNSPECIFIED BACK LOCATION, UNSPECIFIED BACK PAIN LATERALITY: ICD-10-CM

## 2025-04-06 RX ORDER — TRAMADOL HYDROCHLORIDE 50 MG/1
50-100 TABLET ORAL EVERY 6 HOURS PRN
Qty: 180 TABLET | Refills: 0 | Status: SHIPPED | OUTPATIENT
Start: 2025-04-06

## 2025-04-09 ENCOUNTER — OFFICE VISIT (OUTPATIENT)
Dept: FAMILY MEDICINE CLINIC | Facility: CLINIC | Age: 45
End: 2025-04-09
Payer: COMMERCIAL

## 2025-04-09 VITALS
HEART RATE: 69 BPM | DIASTOLIC BLOOD PRESSURE: 70 MMHG | SYSTOLIC BLOOD PRESSURE: 90 MMHG | TEMPERATURE: 98.2 F | BODY MASS INDEX: 23.12 KG/M2 | OXYGEN SATURATION: 97 % | WEIGHT: 135.4 LBS | HEIGHT: 64 IN

## 2025-04-09 DIAGNOSIS — G89.29 CHRONIC LOW BACK PAIN, UNSPECIFIED BACK PAIN LATERALITY, UNSPECIFIED WHETHER SCIATICA PRESENT: ICD-10-CM

## 2025-04-09 DIAGNOSIS — D50.8 IRON DEFICIENCY ANEMIA SECONDARY TO INADEQUATE DIETARY IRON INTAKE: ICD-10-CM

## 2025-04-09 DIAGNOSIS — C44.519 BASAL CELL CARCINOMA OF BACK: ICD-10-CM

## 2025-04-09 DIAGNOSIS — F32.A MILD DEPRESSION: ICD-10-CM

## 2025-04-09 DIAGNOSIS — M54.50 CHRONIC LOW BACK PAIN, UNSPECIFIED BACK PAIN LATERALITY, UNSPECIFIED WHETHER SCIATICA PRESENT: ICD-10-CM

## 2025-04-09 DIAGNOSIS — Z00.00 WELL ADULT EXAM: Primary | ICD-10-CM

## 2025-04-09 DIAGNOSIS — K59.09 CHRONIC CONSTIPATION: ICD-10-CM

## 2025-04-09 PROCEDURE — 99396 PREV VISIT EST AGE 40-64: CPT | Performed by: FAMILY MEDICINE

## 2025-04-09 NOTE — ASSESSMENT & PLAN NOTE
History of multiple basal cell carcinomas on her back.  Again reminded patient to follow-up with Derm.  New referral placed today  Orders:  •  Ambulatory Referral to Dermatology; Future

## 2025-04-09 NOTE — ASSESSMENT & PLAN NOTE
Hemoglobin from April 1 was stable at 13.8.  Ferritin 77.  Patient is status post 5 iron infusions.  She states she feels much better.  No longer being followed by hematology.  Recommend continuing to monitor CBC and iron levels every 6 months.

## 2025-04-09 NOTE — ASSESSMENT & PLAN NOTE
Longstanding history of chronic back pain.  Patient has been evaluated by neurosurgery and pain management in the past.  She is status post rhizotomy.  She has tried multiple medications in the past including NSAIDs, muscle relaxers, and pregabalin without benefit.  She has been stable on tramadol 50 mg, 6 tablets daily for many years.  She denies any side effects.    PDMP checked.  No red flags.  Patient has opioid agreement on file

## 2025-04-09 NOTE — PROGRESS NOTES
Name: Lucia Arita      : 1980      MRN: [id number]  Encounter Provider: Luis Miguel Arndt DO  Encounter Date: 2025   Encounter department: [facility]    Assessment & Plan  Well adult exam  Patient presents for 44-year-old physical examination and med check appointment.  Overall patient is doing well without complaints.  She is compliant with prescribed medications without side effects.  She states her diet is healthy.  She states she has not been exercising as much during the winter, but plans to increase her activity with the warmer weather.  She is a non-smoker.  She does not drink alcohol.  She had labs drawn on .  On examination, blood pressure 90/70.  BMI 23.24.  Remainder of exam was unremarkable.  Recommend continue to follow healthy diet and regular exercise program (at least 150 minutes of aerobic exercise weekly).       Iron deficiency anemia secondary to inadequate dietary iron intake    Hemoglobin from  was stable at 13.8.  Ferritin 77.  Patient is status post 5 iron infusions.  She states she feels much better.  No longer being followed by hematology.  Recommend continuing to monitor CBC and iron levels every 6 months.         Chronic low back pain, unspecified back pain laterality, unspecified whether sciatica present  Longstanding history of chronic back pain.  Patient has been evaluated by neurosurgery and pain management in the past.  She is status post rhizotomy.  She has tried multiple medications in the past including NSAIDs, muscle relaxers, and pregabalin without benefit.  She has been stable on tramadol 50 mg, 6 tablets daily for many years.  She denies any side effects.    PDMP checked.  No red flags.  Patient has opioid agreement on file       Mild depression  Doing well on citalopram 10 mg daily         Basal cell carcinoma of back  History of multiple basal cell carcinomas on her back.  Again reminded patient to follow-up with Derm.  New  referral placed today  Orders:  •  Ambulatory Referral to Dermatology; Future    Chronic constipation  Overall under good control with coffee          Lab results from April 1 reviewed with patient    3 months (CBC, iron levels 6 months)      History of Present Illness     Patient presents for 44-year-old physical examination and med check appointment.  Overall patient is doing well without complaints.  She is compliant with prescribed medications without side effects.  She states her diet is healthy.  She states she has not been exercising as much during the winter, but plans to increase her activity with the warmer weather.  She is a non-smoker.  She does not drink alcohol.  She had labs drawn on April 1.    Back Pain  This is a chronic problem. The current episode started more than 1 year ago. The problem occurs daily. The problem has been waxing and waning since onset. The pain is present in the lumbar spine and sacro-iliac. The quality of the pain is described as aching. The pain does not radiate. The pain is at a severity of 7/10. The pain is Worse during the night. The symptoms are aggravated by lying down, sitting, standing and stress. Stiffness is present In the morning. Pertinent negatives include no abdominal pain, bladder incontinence, bowel incontinence, chest pain, dysuria, fever, headaches, leg pain, numbness, paresis, paresthesias, pelvic pain, perianal numbness, tingling, weakness or weight loss.     Review of Systems   Constitutional:  Negative for chills, fever and weight loss.   HENT:  Negative for ear pain and sore throat.    Eyes:  Negative for pain and visual disturbance.   Respiratory:  Negative for cough and shortness of breath.    Cardiovascular:  Negative for chest pain and palpitations.   Gastrointestinal:  Negative for abdominal pain, bowel incontinence and vomiting.   Genitourinary:  Negative for bladder incontinence, dysuria, hematuria and pelvic pain.   Musculoskeletal:  Positive for  back pain. Negative for arthralgias.   Skin:  Negative for color change and rash.   Neurological:  Negative for tingling, seizures, syncope, weakness, numbness, headaches and paresthesias.   All other systems reviewed and are negative.    Past Medical History:   Diagnosis Date   • Anxiety     Last assessed: 10/11/13   • Basal cell carcinoma     on going   • Cancer (HCC)     Skin -Basal Cell   • Constipation    • Insomnia     Last assessed: 13   • Sacroiliitis (HCC)     Last assessed: 10/11/13   • Urinary tract infection 2020   • Vitamin D deficiency     Last assessed: 10/11/13     Past Surgical History:   Procedure Laterality Date   •  SECTION      x2 Last assessed: 14   • COLONOSCOPY     • HERNIA REPAIR  07/21/15    Dr repaired during csection   • SKIN LESION EXCISION      Basal cell skin cancer excision at age 14   • SPINE SURGERY      Radiofrequency ablation     Family History   Problem Relation Age of Onset   • Diabetes Family         Mellitus   • Hypertension Family    • Hypertension Mother    • Osteoporosis Mother    • Diabetes Father    • ALS Father    • Alcohol abuse Father    • Lung cancer Maternal Grandfather 70   • Alcohol abuse Maternal Grandfather    • Ovarian cancer Paternal Grandmother 63   • Cancer Paternal Grandmother         Ovarian   • Melanoma Maternal Uncle         age unknown   • No Known Problems Sister    • No Known Problems Paternal Grandfather    • No Known Problems Maternal Aunt    • No Known Problems Maternal Aunt    • No Known Problems Paternal Aunt    • Deep vein thrombosis Maternal Grandmother      Social History     Tobacco Use   • Smoking status: Former     Current packs/day: 0.00     Average packs/day: 0.3 packs/day for 10.0 years (2.5 ttl pk-yrs)     Types: Cigarettes     Start date: 2004     Quit date: 2014     Years since quitting: 10.4     Passive exposure: Never   • Smokeless tobacco: Former     Quit date: 2014   • Tobacco comments:      "QUIT 4 YEARS AGO   Vaping Use   • Vaping status: Never Used   Substance and Sexual Activity   • Alcohol use: Not Currently     Alcohol/week: 2.0 standard drinks of alcohol     Types: 2 Glasses of wine per week   • Drug use: No   • Sexual activity: Yes     Partners: Male     Birth control/protection: Male Sterilization     Current Outpatient Medications on File Prior to Visit   Medication Sig   • cholecalciferol (VITAMIN D3) 400 units tablet Take 400 Units by mouth daily   • citalopram (CeleXA) 10 mg tablet TAKE 1 TABLET DAILY   • mupirocin (BACTROBAN) 2 % ointment APPLY TO AFFECTED AREA 3 TIMES A DAY   • ondansetron (ZOFRAN) 4 mg tablet Take 1 tablet (4 mg total) by mouth every 8 (eight) hours as needed for nausea or vomiting   • traMADol (ULTRAM) 50 mg tablet Take 1-2 tablets ( mg total) by mouth every 6 (six) hours as needed for moderate pain     No Known Allergies  Immunization History   Administered Date(s) Administered   • Hep B, adult 12/17/1998, 05/27/1999   • MMR 12/17/1998   • Tdap 07/01/2015     Objective   BP 90/70 (BP Location: Left arm, Patient Position: Sitting, Cuff Size: Adult)   Pulse 69   Temp 98.2 °F (36.8 °C) (Temporal)   Ht 5' 4\" (1.626 m)   Wt 61.4 kg (135 lb 6.4 oz)   LMP 04/08/2025 (Exact Date)   SpO2 97%   BMI 23.24 kg/m²     Physical Exam  Vitals and nursing note reviewed.   Constitutional:       General: She is not in acute distress.     Appearance: She is well-developed.   HENT:      Head: Normocephalic and atraumatic.   Eyes:      Conjunctiva/sclera: Conjunctivae normal.   Cardiovascular:      Rate and Rhythm: Normal rate and regular rhythm.      Heart sounds: No murmur heard.  Pulmonary:      Effort: Pulmonary effort is normal. No respiratory distress.      Breath sounds: Normal breath sounds.   Abdominal:      Palpations: Abdomen is soft.      Tenderness: There is no abdominal tenderness.   Musculoskeletal:         General: No swelling.      Cervical back: Neck supple. "   Skin:     General: Skin is warm and dry.      Capillary Refill: Capillary refill takes less than 2 seconds.   Neurological:      Mental Status: She is alert.   Psychiatric:         Mood and Affect: Mood normal.

## 2025-05-02 DIAGNOSIS — G89.29 CHRONIC BACK PAIN, UNSPECIFIED BACK LOCATION, UNSPECIFIED BACK PAIN LATERALITY: ICD-10-CM

## 2025-05-02 DIAGNOSIS — M54.9 CHRONIC BACK PAIN, UNSPECIFIED BACK LOCATION, UNSPECIFIED BACK PAIN LATERALITY: ICD-10-CM

## 2025-05-03 RX ORDER — TRAMADOL HYDROCHLORIDE 50 MG/1
50-100 TABLET ORAL EVERY 6 HOURS PRN
Qty: 180 TABLET | Refills: 0 | Status: SHIPPED | OUTPATIENT
Start: 2025-05-03

## 2025-05-28 DIAGNOSIS — G89.29 CHRONIC BACK PAIN, UNSPECIFIED BACK LOCATION, UNSPECIFIED BACK PAIN LATERALITY: ICD-10-CM

## 2025-05-28 DIAGNOSIS — M54.9 CHRONIC BACK PAIN, UNSPECIFIED BACK LOCATION, UNSPECIFIED BACK PAIN LATERALITY: ICD-10-CM

## 2025-05-29 DIAGNOSIS — M54.9 CHRONIC BACK PAIN, UNSPECIFIED BACK LOCATION, UNSPECIFIED BACK PAIN LATERALITY: ICD-10-CM

## 2025-05-29 DIAGNOSIS — G89.29 CHRONIC BACK PAIN, UNSPECIFIED BACK LOCATION, UNSPECIFIED BACK PAIN LATERALITY: ICD-10-CM

## 2025-05-29 RX ORDER — TRAMADOL HYDROCHLORIDE 50 MG/1
50-100 TABLET ORAL EVERY 6 HOURS PRN
Qty: 180 TABLET | Refills: 0 | Status: SHIPPED | OUTPATIENT
Start: 2025-05-29

## 2025-05-29 RX ORDER — TRAMADOL HYDROCHLORIDE 50 MG/1
50-100 TABLET ORAL EVERY 6 HOURS PRN
Qty: 180 TABLET | Refills: 0 | OUTPATIENT
Start: 2025-05-29

## 2025-06-02 DIAGNOSIS — G89.29 CHRONIC BACK PAIN, UNSPECIFIED BACK LOCATION, UNSPECIFIED BACK PAIN LATERALITY: ICD-10-CM

## 2025-06-02 DIAGNOSIS — M54.9 CHRONIC BACK PAIN, UNSPECIFIED BACK LOCATION, UNSPECIFIED BACK PAIN LATERALITY: ICD-10-CM

## 2025-06-16 DIAGNOSIS — F32.A MILD DEPRESSION: ICD-10-CM

## 2025-06-16 RX ORDER — CITALOPRAM HYDROBROMIDE 10 MG/1
10 TABLET ORAL DAILY
Qty: 90 TABLET | Refills: 1 | Status: SHIPPED | OUTPATIENT
Start: 2025-06-16

## 2025-06-26 ENCOUNTER — TELEPHONE (OUTPATIENT)
Age: 45
End: 2025-06-26

## 2025-06-26 DIAGNOSIS — G89.29 CHRONIC BACK PAIN, UNSPECIFIED BACK LOCATION, UNSPECIFIED BACK PAIN LATERALITY: ICD-10-CM

## 2025-06-26 DIAGNOSIS — M54.9 CHRONIC BACK PAIN, UNSPECIFIED BACK LOCATION, UNSPECIFIED BACK PAIN LATERALITY: ICD-10-CM

## 2025-06-26 RX ORDER — TRAMADOL HYDROCHLORIDE 50 MG/1
50-100 TABLET ORAL EVERY 6 HOURS PRN
Qty: 180 TABLET | Refills: 0 | Status: CANCELLED | OUTPATIENT
Start: 2025-06-26

## 2025-06-27 DIAGNOSIS — G89.29 CHRONIC BACK PAIN, UNSPECIFIED BACK LOCATION, UNSPECIFIED BACK PAIN LATERALITY: ICD-10-CM

## 2025-06-27 DIAGNOSIS — M54.9 CHRONIC BACK PAIN, UNSPECIFIED BACK LOCATION, UNSPECIFIED BACK PAIN LATERALITY: ICD-10-CM

## 2025-06-27 RX ORDER — TRAMADOL HYDROCHLORIDE 50 MG/1
50-100 TABLET ORAL EVERY 6 HOURS PRN
Qty: 180 TABLET | Refills: 0 | Status: SHIPPED | OUTPATIENT
Start: 2025-06-27

## 2025-07-16 ENCOUNTER — OFFICE VISIT (OUTPATIENT)
Dept: FAMILY MEDICINE CLINIC | Facility: CLINIC | Age: 45
End: 2025-07-16
Payer: COMMERCIAL

## 2025-07-16 VITALS
BODY MASS INDEX: 23.74 KG/M2 | DIASTOLIC BLOOD PRESSURE: 70 MMHG | OXYGEN SATURATION: 96 % | HEART RATE: 71 BPM | WEIGHT: 134 LBS | TEMPERATURE: 98.4 F | SYSTOLIC BLOOD PRESSURE: 90 MMHG | HEIGHT: 63 IN

## 2025-07-16 DIAGNOSIS — G89.29 CHRONIC LOW BACK PAIN, UNSPECIFIED BACK PAIN LATERALITY, UNSPECIFIED WHETHER SCIATICA PRESENT: ICD-10-CM

## 2025-07-16 DIAGNOSIS — C44.519 BASAL CELL CARCINOMA OF BACK: ICD-10-CM

## 2025-07-16 DIAGNOSIS — K59.09 CHRONIC CONSTIPATION: ICD-10-CM

## 2025-07-16 DIAGNOSIS — S60.042A CONTUSION OF LEFT RING FINGER WITHOUT DAMAGE TO NAIL, INITIAL ENCOUNTER: ICD-10-CM

## 2025-07-16 DIAGNOSIS — F32.A MILD DEPRESSION: ICD-10-CM

## 2025-07-16 DIAGNOSIS — Z00.00 WELL ADULT EXAM: Primary | ICD-10-CM

## 2025-07-16 DIAGNOSIS — D50.8 IRON DEFICIENCY ANEMIA SECONDARY TO INADEQUATE DIETARY IRON INTAKE: ICD-10-CM

## 2025-07-16 DIAGNOSIS — M54.50 CHRONIC LOW BACK PAIN, UNSPECIFIED BACK PAIN LATERALITY, UNSPECIFIED WHETHER SCIATICA PRESENT: ICD-10-CM

## 2025-07-16 PROCEDURE — 99396 PREV VISIT EST AGE 40-64: CPT | Performed by: FAMILY MEDICINE

## 2025-07-16 NOTE — ASSESSMENT & PLAN NOTE
Longstanding history of chronic back pain.  Patient has been evaluated by pain management and neurosurgery in the past.  She is status post rhizotomy.  She has been on multiple medications in the past including NSAIDs, muscle relaxers, and pregabalin without benefit.  She has been stable on tramadol 50 mg, 6 tablets daily for many years.  Denies any side effects.    PDMP checked.  No red flags.  Patient has controlled substance agreement on file

## 2025-07-16 NOTE — PROGRESS NOTES
Adult Annual Physical  Name: Lucia Arita      : 1980      MRN: [id number]  Encounter Provider: Luis Miguel Arndt DO  Encounter Date: 2025   Encounter department: [facility]    :  Assessment & Plan  Well adult exam  Patient presents for 44-year-old physical examination and med check.  She contused her ring finger on her left hand a few weeks ago, otherwise she has been doing well.  She states her diet is healthy.  She walks on the treadmill 30 minutes a day.  She is a non-smoker.  Only rare use of alcohol.  She drinks 2 cups of coffee daily.  She is compliant with prescribed medications without side effects.  On exam, blood pressure 90/70.  BMI 23.74.  Remainder of exam was unremarkable.  Recommend continue to follow a healthy diet and regular exercise program (at least 150 minutes of aerobic exercise weekly).       Contusion of left ring finger without damage to nail, initial encounter  Patient contused ring finger of left hand a few weeks ago.  Overall feeling somewhat better, but not 100%.  Will place order for x-rays if needed  Orders:  •  XR hand 3+ vw left; Future    Chronic low back pain, unspecified back pain laterality, unspecified whether sciatica present  Longstanding history of chronic back pain.  Patient has been evaluated by pain management and neurosurgery in the past.  She is status post rhizotomy.  She has been on multiple medications in the past including NSAIDs, muscle relaxers, and pregabalin without benefit.  She has been stable on tramadol 50 mg, 6 tablets daily for many years.  Denies any side effects.    PDMP checked.  No red flags.  Patient has controlled substance agreement on file         Iron deficiency anemia secondary to inadequate dietary iron intake    Hemoglobin stable at 13.8.  Last ferritin was 77 in 2025.  Status post 5 iron infusions.  Overall doing well.  No longer being followed by hematology.  Will continue to check CBC and iron  levels every 6 months for now.    Orders:  •  CBC and differential; Future  •  Iron Panel (Includes Ferritin, Iron Sat%, Iron, and TIBC); Future    Mild depression  Stable on citalopram 10         Basal cell carcinoma of back  Continue follow-up with dermatology as directed       Chronic constipation  Stable           Preventive Screenings:    - Cervical cancer screening: screening up-to-date   - Breast cancer screening: screening up-to-date     Immunizations:  - Immunizations due: Tdap           Current with breast cancer screening  Colonoscopy 2024 (next due 2029)    Last tetanus booster 2015.  Patient declines today.  She states she will get booster at next visit    Last labs April 1, 2025 reviewed.  Will check CBC and iron panel prior to next appointment    3 months (yearly labs, with iron every 6)      History of Present Illness     Adult Annual Physical:  Patient presents for annual physical. Patient presents for 44-year-old physical examination and med check.  She contused her ring finger on her left hand a few weeks ago, otherwise she has been doing well.  She states her diet is healthy.  She walks on the treadmill 30 minutes a day.  She is a non-smoker.  Only rare use of alcohol.  She drinks 2 cups of coffee daily.  She is compliant with prescribed medications without side effects..     Diet and Physical Activity:  - Diet/Nutrition: well balanced diet.  - Exercise: walking.    Depression Screening:    - PHQ-9 Score: 0    General Health:  - Sleep: 4-6 hours of sleep on average.  - Hearing: normal hearing right ear and normal hearing left ear.  - Vision: wears glasses.  - Dental: regular dental visits.    /GYN Health:  - Follows with GYN: yes.   - Menopause: premenopausal.   - Last menstrual cycle: 6/17/2025.   - History of STDs: no  - Contraception: male partner had vasectomy.      Advanced Care Planning:  - Has an advanced directive?: no    - Has a durable medical POA?: no      Review of Systems  "  Constitutional:  Negative for chills and fever.   HENT:  Negative for ear pain and sore throat.    Eyes:  Negative for pain and visual disturbance.   Respiratory:  Negative for cough and shortness of breath.    Cardiovascular:  Negative for chest pain and palpitations.   Gastrointestinal:  Negative for abdominal pain and vomiting.   Genitourinary:  Negative for dysuria and hematuria.   Musculoskeletal:  Negative for arthralgias and back pain.   Skin:  Negative for color change and rash.   Neurological:  Negative for seizures and syncope.   All other systems reviewed and are negative.        Objective   BP 90/70 (BP Location: Left arm, Patient Position: Sitting, Cuff Size: Standard)   Pulse 71   Temp 98.4 °F (36.9 °C) (Temporal)   Ht 5' 3\" (1.6 m)   Wt 60.8 kg (134 lb)   LMP 06/17/2025   SpO2 96%   BMI 23.74 kg/m²     Physical Exam  Vitals and nursing note reviewed.   Constitutional:       General: She is not in acute distress.     Appearance: She is well-developed.   HENT:      Head: Normocephalic and atraumatic.     Eyes:      Conjunctiva/sclera: Conjunctivae normal.       Cardiovascular:      Rate and Rhythm: Normal rate and regular rhythm.      Heart sounds: No murmur heard.  Pulmonary:      Effort: Pulmonary effort is normal. No respiratory distress.      Breath sounds: Normal breath sounds.   Abdominal:      Palpations: Abdomen is soft.      Tenderness: There is no abdominal tenderness.     Musculoskeletal:         General: No swelling.      Cervical back: Neck supple.     Skin:     General: Skin is warm and dry.      Capillary Refill: Capillary refill takes less than 2 seconds.     Neurological:      Mental Status: She is alert.     Psychiatric:         Mood and Affect: Mood normal.         "

## 2025-07-16 NOTE — ASSESSMENT & PLAN NOTE
Hemoglobin stable at 13.8.  Last ferritin was 77 in April 2025.  Status post 5 iron infusions.  Overall doing well.  No longer being followed by hematology.  Will continue to check CBC and iron levels every 6 months for now.    Orders:  •  CBC and differential; Future  •  Iron Panel (Includes Ferritin, Iron Sat%, Iron, and TIBC); Future

## 2025-07-23 DIAGNOSIS — G89.29 CHRONIC BACK PAIN, UNSPECIFIED BACK LOCATION, UNSPECIFIED BACK PAIN LATERALITY: ICD-10-CM

## 2025-07-23 DIAGNOSIS — M54.9 CHRONIC BACK PAIN, UNSPECIFIED BACK LOCATION, UNSPECIFIED BACK PAIN LATERALITY: ICD-10-CM

## 2025-07-23 RX ORDER — TRAMADOL HYDROCHLORIDE 50 MG/1
50-100 TABLET ORAL EVERY 6 HOURS PRN
Qty: 180 TABLET | Refills: 0 | Status: SHIPPED | OUTPATIENT
Start: 2025-07-23

## 2025-08-18 DIAGNOSIS — M54.9 CHRONIC BACK PAIN, UNSPECIFIED BACK LOCATION, UNSPECIFIED BACK PAIN LATERALITY: ICD-10-CM

## 2025-08-18 DIAGNOSIS — G89.29 CHRONIC BACK PAIN, UNSPECIFIED BACK LOCATION, UNSPECIFIED BACK PAIN LATERALITY: ICD-10-CM

## 2025-08-18 RX ORDER — TRAMADOL HYDROCHLORIDE 50 MG/1
50-100 TABLET ORAL EVERY 6 HOURS PRN
Qty: 180 TABLET | Refills: 0 | Status: SHIPPED | OUTPATIENT
Start: 2025-08-18